# Patient Record
Sex: FEMALE | Race: WHITE | Employment: STUDENT | URBAN - METROPOLITAN AREA
[De-identification: names, ages, dates, MRNs, and addresses within clinical notes are randomized per-mention and may not be internally consistent; named-entity substitution may affect disease eponyms.]

---

## 2021-07-02 ENCOUNTER — HOSPITAL ENCOUNTER (OUTPATIENT)
Dept: PHYSICAL THERAPY | Age: 21
Setting detail: THERAPIES SERIES
Discharge: HOME | End: 2021-07-02

## 2021-07-02 PROCEDURE — 97161 PT EVAL LOW COMPLEX 20 MIN: CPT

## 2021-07-02 PROCEDURE — 97110 THERAPEUTIC EXERCISES: CPT

## 2021-07-02 NOTE — FLOWSHEET NOTE
[facility], [place], [address]  Phone: [phone]  Fax [phone]        Physical Therapy Treatment Note/ Progress Report:           Date:  2021    Patient Name:  Misha German    :  2000  MRN: [id number]  Restrictions/Precautions:    Medical/Treatment Diagnosis Information:  Diagnosis: M25.572 L ankle pain  Treatment Diagnosis: M25.572 L ankle pain, M62.82 L LE weakness, M25.672 L ankle stiffness  Insurance/Certification information:  PT Insurance Information: [place]  Physician Information:  Referring Practitioner: Dr. Raghav Perdue  Has the plan of care been signed (Y/N):        []  Yes  [x]  No     Date of Patient follow up with Physician: to get scheduled with Dr. Vero Barajas next week      Is this a Progress Report:     []  Yes  [x]  No        If Yes:  Date Range for reporting period:  Beginning 21  Ending     Progress report will be due (10 Rx or 30 days whichever is DPDY):65      Recertification will be due (POC Duration  / 90 days whichever is less): 21     Visit # Insurance Allowable Auth Required   In-person 1 ?  []  Yes []  No    Telehealth - ? []  Yes []  No    Total          Functional Scale: LEFS: 65/80 = 81% ability, 19% disability  Date assessed:  21         Number of Comorbidities:  [x]0     []1-2    []3+    Latex Allergy:  [x]NO      []YES  Preferred Language for Healthcare:   [x]English       []other:      Pain level:  2-7/10     SUBJECTIVE:  See eval    OBJECTIVE: See eval   Observation:    Test measurements:      RESTRICTIONS/PRECAUTIONS: none    Exercises/Interventions:     Therapeutic Ex (77963) Sets/Reps/ sec Notes/CUES   Clamshell Green x15 L    S/L hip abd Green x15 L    Bridge with abd Green x15    Foot intrinsics nv    Ankle t-band 5 way with foot pointing nv    Seated releve with tennis ball nv                             Pt edu: kinetic chain, use of hip for turnout and support, activity modification     Manual Intervention (05545)     Taping nv? Talar post mob nv                        NMR re-education (29006)  CUES NEEDED   SLB challenges nv    Footprints nv                                       Therapeutic Activity (07844)                                         Therapeutic Exercise and NMR EXR  [] (95360) Provided verbal/tactile cueing for activities related to strengthening, flexibility, endurance, ROM for improvements in LE, proximal hip, and core control with self care, mobility, lifting, ambulation.  [] (85585) Provided verbal/tactile cueing for activities related to improving balance, coordination, kinesthetic sense, posture, motor skill, proprioception  to assist with LE, proximal hip, and core control in self care, mobility, lifting, ambulation and eccentric single leg control.      NMR and Therapeutic Activities:    [] (21107 or 35120) Provided verbal/tactile cueing for activities related to improving balance, coordination, kinesthetic sense, posture, motor skill, proprioception and motor activation to allow for proper function of core, proximal hip and LE with self care and ADLs  [] (10684) Gait Re-education- Provided training and instruction to the patient for proper LE, core and proximal hip recruitment and positioning and eccentric body weight control with ambulation re-education including up and down stairs     Home Exercise Program:    [x] (81340) Reviewed/Progressed HEP activities related to strengthening, flexibility, endurance, ROM of core, proximal hip and LE for functional self-care, mobility, lifting and ambulation/stair navigation   [] (14583)Reviewed/Progressed HEP activities related to improving balance, coordination, kinesthetic sense, posture, motor skill, proprioception of core, proximal hip and LE for self care, mobility, lifting, and ambulation/stair navigation      Manual Treatments:  PROM / STM / Oscillations-Mobs:  G-I, II, III, IV (PA's, Inf., Post.)  [] (05913) Provided manual therapy to mobilize LE, proximal hip and/or LS spine soft tissue/joints for the purpose of modulating pain, promoting relaxation,  increasing ROM, reducing/eliminating soft tissue swelling/inflammation/restriction, improving soft tissue extensibility and allowing for proper ROM for normal function with self care, mobility, lifting and ambulation. Modalities:     [] GAME READY (VASO)- for significant edema, swelling, pain control. Charges  Timed Code Treatment Minutes: 13 min   Total Treatment Minutes: 45 min     [x] EVAL (LOW) 45103   [] EVAL (MOD) 00858  [] EVAL (HIGH) 58761   [] RE-EVAL     [x] BN(58547) x 1    [] IONTO  [] NMR (07574) x     [] VASO  [] Manual (09375) x      [] Other:  [] TA x      [] Mech Traction (21283)  [] ES(attended) (37630)      [] ES (un) (62106):       GOALS:   Patient stated goal: get back to dancing without pain  [] Progressing: [] Met: [] Not Met: [] Adjusted    Therapist goals for Patient:   Short Term Goals: To be achieved in: 2 weeks  1. Independent in HEP and progression per patient tolerance, in order to prevent re-injury. [] Progressing: [] Met: [] Not Met: [] Adjusted  2. Patient will have a decrease in pain to facilitate improvement in movement, function, and ADLs as indicated by Functional Deficits. [] Progressing: [] Met: [] Not Met: [] Adjusted    Long Term Goals: To be achieved in: 6 weeks  1. Disability index score of 10% or less for the LEFS to assist with reaching prior level of function. [] Progressing: [] Met: [] Not Met: [] Adjusted  2. Patient will demonstrate increased AROM to WNL and symmetrical to other limb to allow for proper joint functioning as indicated by patients Functional Deficits. [] Progressing: [] Met: [] Not Met: [] Adjusted  3.  Patient will demonstrate an increase in Strength to good proximal hip strength and control, within 5lb HHD in LE to allow for proper functional mobility as indicated by patients Functional Deficits. [] Progressing: [] Met: [] Not Met: [] Adjusted  4. Patient will be able to ascend and descend flight of stairs with normal reciprocal pattern without increased symptoms or restriction. [] Progressing: [] Met: [] Not Met: [] Adjusted  5. 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 [] Progressing: [] Met: [] Not Met: [] Adjusted    Progression Towards Functional goals:  [] Patient is progressing as expected towards functional goals listed. [] Progression is slowed due to complexities listed. [] Progression has been slowed due to co-morbidities. [x] Plan just implemented, too soon to assess goals progression  [] Other:         Overall Progression Towards Functional goals/ Treatment Progress Update:  [] Patient is progressing as expected towards functional goals listed. [] Progression is slowed due to complexities/Impairments listed. [] Progression has been slowed due to co-morbidities. [x] Plan just implemented, too soon to assess goals progression <30days   [] Goals require adjustment due to lack of progress  [] Patient is not progressing as expected and requires additional follow up with physician  [] Other    Prognosis for POC: [x] Good [] Fair  [] Poor      Patient requires continued skilled intervention: [x] Yes  [] No    Treatment/Activity Tolerance:  [x] Patient able to complete treatment  [] Patient limited by fatigue  [] Patient limited by pain    [] Patient limited by other medical complications  [] Other:     ASSESSMENT: Pt found the hip strengthening to be quite fatiguing at the hip but did not cause any increased ankle pain.  After pt education pt seems to have better understanding of the kinetic chain and how all pieces needs to work well otherwise there will be compensation and overstress somewhere along the chain. Return to Play: (if applicable)   [x]  Stage 1: Intro to Strength   []  Stage 2: Return to Run and Strength   []  Stage 3: Return to Jump and Strength   []  Stage 4: Dynamic Strength and Agility   []  Stage 5: Sport Specific Training     []  Ready to Return to Play, Meets All Above Stages   []  Not Ready for Return to Sports   Comments:                               PLAN: See win. Pt to be seen 2x week for 6 weeks. May also work with [facility] for general conditioning. [x] Continue per plan of care [] Alter current plan (see comments above)  [x] Plan of care initiated [] Hold pending MD visit [] Discharge      Electronically signed by:  Adriane Bolanos, PT, DPT, AT, [place] [id number]    Note: If patient does not return for scheduled/ recommended follow up visits, this note will serve as a discharge from care along with most recent update on progress.

## 2021-07-02 NOTE — PLAN OF CARE
[facility] and [address], [address]  Phone: [phone]  Fax [phone]         Physical Therapy Certification    Dear Referring Practitioner: Dr. Lashon Simmons,    We had the pleasure of evaluating the following patient for physical therapy services at [address] [place] [address]. A summary of our findings can be found in the initial assessment below. This includes our plan of care. If you have any questions or concerns regarding these findings, please do not hesitate to contact me at the office phone number checked above. Thank you for the referral.       Physician Signature:_______________________________Date:__________________  By signing above (or electronic signature), therapists plan is approved by physician    Patient: Miri Kearns   : 2000   MRN: [id number]  Referring Physician: Referring Practitioner: Dr. Lashon Simmons      Evaluation Date: 2021      Medical Diagnosis Information:  Diagnosis: M25.572 L ankle pain   Treatment Diagnosis: M25.572 L ankle pain, M62.82 L LE weakness, M25.672 L ankle stiffness                                         Insurance information: PT Insurance Information: [place]       Precautions/ Contra-indications: none    C-SSRS Triggered by Intake questionnaire (Past 2 wk assessment):   [] No, Questionnaire did not trigger screening. [x] Yes, Patient intake triggered further evaluation      [x] C-SSRS Screening completed: f/u on this assessment at 1st follow-up  [] PCP notified via Plan of Care  [] Emergency services notified     Latex Allergy:  [x]NO      []YES  Preferred Language for Healthcare:   [x]English       []other:    SUBJECTIVE: Patient stated complaint: In 2019 had a L ankle laith but treated it herself and she seemed to recover fine.  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 She saw the  and got taped and thought it would be okay but now that it has persisted and AT saw some kinetic chain issues they decided it best to get into PT to fully address the situation before she starts her fall season with [address] At this point she is not doing en pointe, no single releve or jumps or turns on the L foot. Pt says no troubles with the R foot. Pt also has pain and limitation with going down and up stairs, standing more than 15 min, walking more than a couple minutes, hurts more when has to walk fast or on uneven ground. Pt also reports the ankle feels unstable and may roll easily.       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)    Living Status/Prior Level of Function: Independent with ADLs and IADLs, pre-professional ballet dancer      OBJECTIVE:     ROM LEFT RIGHT   HIP Flex     HIP Abd     HIP Ext     HIP IR     HIP ER     Knee ext     Knee Flex     Ankle  deg 100 deg   Ankle DF knee straight 15 deg pain 10 deg   Ankle DF knee bent 14 deg 14 deg   Ankle In 22 deg pain 28 deg   Ankle Ev 12 deg 12 deg   Strength  LEFT RIGHT   HIP Flexors 5/5 5/5   HIP Abductors 5- 5-   HIP Ext 5 5   Hip ER 3+ 4   Hip IR 3+ 4   Knee EXT (quad)     Knee Flex (HS)     Ankle DF 4/5 5/5   Ankle PF 4+ 5   Ankle Inv 4 5   Ankle EV 4+ 5-   Toes flex 4- 5   Toes ext 4/5 5/5   Circumference  Mid apex  7 cm prox             Reflexes/Sensation: [x]Dermatomes/Myotomes intact    []Reflexes equal and normal bilaterally   []Other:    Joint mobility:     []Normal    [x]Hypo: L talar posterior glide   []Hyper    Palpation: sinus tarsi, anterior TC joint line, distal peroneal tendon    Functional Mobility/Transfers: WFL    Posture: WNL    Bandages/Dressings/Incisions: none    Gait: (include devices/WB status) WNL  Dance Functional Assessment: difficulty in maintains hip ER turnout with L as stance leg, decreased L ankle strategy for L SLB, poor ankle alignments control and stability while perform DL and left SL releve (heel raise). Orthopedic Special Tests: + anterior drawer L, -talar tilt                       [x] Patient history, allergies, meds reviewed. Medical chart reviewed. See intake form. Review Of Systems (ROS):  [x]Performed Review of systems (Integumentary, CardioPulmonary, Neurological) by intake and observation. Intake form has been scanned into medical record. Patient has been instructed to contact their primary care physician regarding ROS issues if not already being addressed at this time.       Co-morbidities/Complexities (which will affect course of rehabilitation):  [x]None           Arthritic conditions   []Rheumatoid arthritis (M05.9)  []Osteoarthritis (M19.91)   Cardiovascular conditions   []Hypertension (I10)  []Hyperlipidemia (E78.5)  []Angina pectoris (I20)  []Atherosclerosis (I70)   Musculoskeletal conditions   []Disc pathology   []Congenital spine pathologies   []Prior surgical intervention  []Osteoporosis (M81.8)  []Osteopenia (M85.8)   Endocrine conditions   []Hypothyroid (E03.9)  []Hyperthyroid Gastrointestinal conditions   []Constipation (J91.89)   Metabolic conditions   []Morbid obesity (E66.01)  []Diabetes type 1(E10.65) or 2 (E11.65)   []Neuropathy (G60.9)     Pulmonary conditions   []Asthma (J45)  []Coughing   []COPD (J44.9)   Psychological Disorders  []Anxiety (F41.9)  []Depression (F32.9)   []Other:   []Other: Barriers to/and or personal factors that will affect rehab potential:              []Age  []Sex              []Motivation/Lack of Motivation                        []Co-Morbidities              []Cognitive Function, education/learning barriers              []Environmental, home barriers              []profession/work barriers  []past PT/medical experience  []other:  Justification:     Falls Risk Assessment (30 days):   [x] Falls Risk assessed and no intervention required. [] Falls Risk assessed and Patient requires intervention due to being higher risk   TUG score (>12s at risk):     [] Falls education provided, including           ASSESSMENT: Pt has been experiencing reoccurring L ankle pain with an increase in her activity level that has now worsened to pain with daily ambulation and ADLs. 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       Functional Impairments:     [x]Noted lumbar/proximal hip/LE joint hypomobility   [x]Decreased LE functional ROM   [x]Decreased core/proximal hip strength and neuromuscular control   [x]Decreased LE functional strength   [x]Reduced balance/proprioceptive control   []other:      Functional Activity Limitations (from functional questionnaire and intake)   []Reduced ability to tolerate prolonged functional positions   []Reduced ability or difficulty with changes of positions or transfers between positions   []Reduced ability to maintain good posture and demonstrate good body mechanics with sitting, bending, and lifting   []Reduced ability to sleep   [] Reduced ability or tolerance with driving and/or computer work   []Reduced ability to perform lifting, carrying tasks   [x]Reduced ability to squat   []Reduced ability to forward bend   [x]Reduced ability to ambulate prolonged functional periods/distances/surfaces   [x]Reduced ability to ascend/descend stairs   [x]Reduced ability to run, hop, cut or jump   []other:    Participation Restrictions   []Reduced participation in self care activities   [x]Reduced participation in home management activities   [x]Reduced participation in work activities   [x]Reduced participation in social activities. [x]Reduced participation in sport/recreation activities. Classification :    []Signs/symptoms consistent with post-surgical status including decreased ROM, strength and function.    [x]Signs/symptoms consistent with joint sprain/strain   []Signs/symptoms consistent with patella-femoral syndrome   []Signs/symptoms consistent with knee OA/hip OA   []Signs/symptoms consistent with internal derangement of knee/Hip   [x]Signs/symptoms consistent with functional hip weakness/NMR control      []Signs/symptoms consistent with tendinitis/tendinosis    []signs/symptoms consistent with pathology which may benefit from Dry needling      []other:      Prognosis/Rehab Potential:      []Excellent   [x]Good    []Fair   []Poor    Tolerance of evaluation/treatment:    []Excellent   [x]Good    []Fair   []Poor  Physical Therapy Evaluation Complexity Justification  [x] A history of present problem with:  [x] no personal factors and/or comorbidities that impact the plan of care;  []1-2 personal factors and/or comorbidities that impact the plan of care  []3 personal factors and/or comorbidities that impact the plan of care  [x] An examination of body systems using standardized tests and measures addressing any of the following: body structures and functions (impairments), activity limitations, and/or participation restrictions;:  [x] a total of 1-2 or more elements [] a total of 3 or more elements   [] a total of 4 or more elements   [x] A clinical presentation with:  [x] stable and/or uncomplicated characteristics   [] evolving clinical presentation with changing characteristics  [] unstable and unpredictable characteristics;   [x] Clinical decision making of [x] low, [] moderate, [] high complexity using standardized patient assessment instrument and/or measurable assessment of functional outcome. [x] EVAL (LOW) 61720 (typically 20 minutes face-to-face)  [] EVAL (MOD) 24164 (typically 30 minutes face-to-face)  [] EVAL (HIGH) 22748 (typically 45 minutes face-to-face)  [] RE-EVAL       PLAN:   Frequency/Duration:  2 days per week for 6 Weeks:  Interventions:  [x]  Therapeutic exercise including: strength training, ROM, for Lower extremity and core   [x]  NMR activation and proprioception for LE, Glutes and Core   [x]  Manual therapy as indicated for LE, Hip and spine to include: Dry Needling/IASTM, STM, PROM, Gr I-IV mobilizations, manipulation. [x] Modalities as needed that may include: thermal agents, E-stim, Biofeedback, US, iontophoresis as indicated  [x] Patient education on joint protection, postural re-education, activity modification, progression of HEP. HEP instruction:   Access Code: Gina Green: [email or web address]  Date: 07/02/2021  Prepared by: Treva Whitfield    Exercises  Clamshell with Resistance - 1 x daily - 7 x weekly - 3 sets - 15 reps  Sidelying Hip Abduction with Resistance at Thighs - 1 x daily - 7 x weekly - 3 sets - 15 reps  Supine Bridge with Resistance Band - 1 x daily - 7 x weekly - 3 sets - 15 reps  Supine Figure 4 Piriformis Stretch - 1 x daily - 7 x weekly - 3 sets - 1 reps - 30 sec hold  Supine Gluteus Stretch - 1 x daily - 7 x weekly - 3 sets - 1 reps - 30 sec hold      GOALS:  Patient stated goal: get back to dancing without pain  [] Progressing: [] Met: [] Not Met: [] Adjusted    Therapist goals for Patient:   Short Term Goals: To be achieved in: 2 weeks  1. Independent in HEP and progression per patient tolerance, in order to prevent re-injury. [] Progressing: [] Met: [] Not Met: [] Adjusted  2. Patient will have a decrease in pain to facilitate improvement in movement, function, and ADLs as indicated by Functional Deficits. [] Progressing: [] Met: [] Not Met: [] Adjusted    Long Term Goals: To be achieved in: 6 weeks  1. Disability index score of 10% or less for the LEFS to assist with reaching prior level of function. [] Progressing: [] Met: [] Not Met: [] Adjusted  2. Patient will demonstrate increased AROM to WNL and symmetrical to other limb to allow for proper joint functioning as indicated by patients Functional Deficits. [] Progressing: [] Met: [] Not Met: [] Adjusted  3. Patient will demonstrate an increase in Strength to good proximal hip strength and control, within 5lb HHD in LE to allow for proper functional mobility as indicated by patients Functional Deficits. [] Progressing: [] Met: [] Not Met: [] Adjusted  4. Patient will be able to ascend and descend flight of stairs with normal reciprocal pattern without increased symptoms or restriction. [] Progressing: [] Met: [] Not Met: [] Adjusted  5. 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      [] Progressing: [] Met: [] Not Met: [] Adjusted     Electronically signed by:  Jeffery Galarza, PT ,DPT, AT, [facility] [id number]

## 2021-07-07 ENCOUNTER — OFFICE VISIT (OUTPATIENT)
Dept: ORTHOPEDIC SURGERY | Age: 21
End: 2021-07-07
Payer: COMMERCIAL

## 2021-07-07 ENCOUNTER — HOSPITAL ENCOUNTER (OUTPATIENT)
Dept: PHYSICAL THERAPY | Age: 21
Setting detail: THERAPIES SERIES
Discharge: HOME | End: 2021-07-07

## 2021-07-07 VITALS — WEIGHT: 120 LBS | RESPIRATION RATE: 12 BRPM | BODY MASS INDEX: 21.26 KG/M2 | HEIGHT: 63 IN

## 2021-07-07 DIAGNOSIS — M84.375A STRESS FRACTURE OF METATARSAL BONE OF LEFT FOOT, INITIAL ENCOUNTER: Primary | ICD-10-CM

## 2021-07-07 DIAGNOSIS — S93.492A SPRAIN OF ANTERIOR TALOFIBULAR LIGAMENT OF LEFT ANKLE, INITIAL ENCOUNTER: ICD-10-CM

## 2021-07-07 DIAGNOSIS — M25.572 ACUTE LEFT ANKLE PAIN: ICD-10-CM

## 2021-07-07 DIAGNOSIS — M79.672 LEFT FOOT PAIN: ICD-10-CM

## 2021-07-07 PROCEDURE — 99204 OFFICE O/P NEW MOD 45 MIN: CPT | Performed by: ORTHOPAEDIC SURGERY

## 2021-07-07 PROCEDURE — 97140 MANUAL THERAPY 1/> REGIONS: CPT

## 2021-07-07 PROCEDURE — 97110 THERAPEUTIC EXERCISES: CPT

## 2021-07-07 RX ORDER — IBUPROFEN 200 MG
200 TABLET ORAL EVERY 6 HOURS PRN
COMMUNITY
End: 2021-09-20

## 2021-07-07 NOTE — FLOWSHEET NOTE
The [address], [address]  Phone: [phone]  Fax [phone]        Physical Therapy Treatment Note/ Progress Report:           Date:  2021    Patient Name:  Niranjan Wall    :  2000  MRN: [id number]  Restrictions/Precautions:    Medical/Treatment Diagnosis Information:  Diagnosis: M25.572 L ankle pain  Treatment Diagnosis: M25.572 L ankle pain, M62.82 L LE weakness, M25.672 L ankle stiffness  Insurance/Certification information:  PT Insurance Information: [place]  Physician Information:  Referring Practitioner: Dr. Alyx Camarena  Has the plan of care been signed (Y/N):        []  Yes  [x]  No     Date of Patient follow up with Physician: to get scheduled with Dr. Julia Braga next week      Is this a Progress Report:     []  Yes  [x]  No        If Yes:  Date Range for reporting period:  Beginning 21  Ending     Progress report will be due (10 Rx or 30 days whichever is LKTC):      Recertification will be due (POC Duration  / 90 days whichever is less): 21     Visit # Insurance Allowable Auth Required   In-person 2 ? []  Yes []  No    Telehealth - ? []  Yes []  No    Total          Functional Scale: LEFS: 65/80 = 81% ability, 19% disability  Date assessed:  21         Number of Comorbidities:  [x]0     []1-2    []3+    Latex Allergy:  [x]NO      []YES  Preferred Language for Healthcare:   [x]English       []other:      Pain level:  5-6/10 walking, 7-8/10 dancing    SUBJECTIVE:  Pt saw the physician this morning and he said the x-ray didn't show anything and is now suggesting getting an MRI.  I talked with the patient about realizing all of this [place] of [place] and not going to be covered by her insurance and will be out of pocket costs so even though she is 23 yo if her parents are still covering her medical costs she really needs to discuss the financial obligation of these medical / Oscillations-Mobs:  G-I, II, III, IV (PA's, Inf., Post.)  [x] (48612) Provided manual therapy to mobilize LE, proximal hip and/or LS spine soft tissue/joints for the purpose of modulating pain, promoting relaxation,  increasing ROM, reducing/eliminating soft tissue swelling/inflammation/restriction, improving soft tissue extensibility and allowing for proper ROM for normal function with self care, mobility, lifting and ambulation. Modalities:     [] GAME READY (VASO)- for significant edema, swelling, pain control. Charges  Timed Code Treatment Minutes: 45 min   Total Treatment Minutes: 45 min     [] EVAL (LOW) 47932   [] EVAL (MOD) 81207  [] EVAL (HIGH) 40314   [] RE-EVAL     [x] HA(70737) x 2    [] IONTO  [] NMR (21730) x     [] VASO  [x] Manual (74534) x  1    [] Other:  [] TA x      [] Mech Traction (77616)  [] ES(attended) (22654)      [] ES (un) (19245):       GOALS:   Patient stated goal: get back to dancing without pain  [] Progressing: [] Met: [] Not Met: [] Adjusted    Therapist goals for Patient:   Short Term Goals: To be achieved in: 2 weeks  1. Independent in HEP and progression per patient tolerance, in order to prevent re-injury. [] Progressing: [] Met: [] Not Met: [] Adjusted  2. Patient will have a decrease in pain to facilitate improvement in movement, function, and ADLs as indicated by Functional Deficits. [] Progressing: [] Met: [] Not Met: [] Adjusted    Long Term Goals: To be achieved in: 6 weeks  1. Disability index score of 10% or less for the LEFS to assist with reaching prior level of function. [] Progressing: [] Met: [] Not Met: [] Adjusted  2. Patient will demonstrate increased AROM to WNL and symmetrical to other limb to allow for proper joint functioning as indicated by patients Functional Deficits. [] Progressing: [] Met: [] Not Met: [] Adjusted  3.  Patient will demonstrate an increase in Strength to good proximal hip strength and control, within 5lb HHD in LE to allow for proper functional mobility as indicated by patients Functional Deficits. [] Progressing: [] Met: [] Not Met: [] Adjusted  4. Patient will be able to ascend and descend flight of stairs with normal reciprocal pattern without increased symptoms or restriction. [] Progressing: [] Met: [] Not Met: [] Adjusted  5. 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 [] Progressing: [] Met: [] Not Met: [] Adjusted    Progression Towards Functional goals:  [] Patient is progressing as expected towards functional goals listed. [] Progression is slowed due to complexities listed. [] Progression has been slowed due to co-morbidities. [x] Plan just implemented, too soon to assess goals progression  [] Other:         Overall Progression Towards Functional goals/ Treatment Progress Update:  [] Patient is progressing as expected towards functional goals listed. [] Progression is slowed due to complexities/Impairments listed. [] Progression has been slowed due to co-morbidities. [x] Plan just implemented, too soon to assess goals progression <30days   [] Goals require adjustment due to lack of progress  [] Patient is not progressing as expected and requires additional follow up with physician  [] Other    Prognosis for POC: [x] Good [] Fair  [] Poor      Patient requires continued skilled intervention: [x] Yes  [] No    Treatment/Activity Tolerance:  [x] Patient able to complete treatment  [] Patient limited by fatigue  [] Patient limited by pain    [] Patient limited by other medical complications  [] Other:     ASSESSMENT:After talar posterior glide pt had improved DF ROM with less pain. Able to work up and down the kinetic chain today which fatigued both the hip and the ankle.  Pt found seated releve with tennis ball how much less stability she had int he L ankle compared to the R. She also continues to notice immediate pain relief with the ankle taping supporting ankle eversion. Return to Play: (if applicable)   [x]  Stage 1: Intro to Strength   []  Stage 2: Return to Run and Strength   []  Stage 3: Return to Jump and Strength   []  Stage 4: Dynamic Strength and Agility   []  Stage 5: Sport Specific Training     []  Ready to Return to Play, Meets All Above Stages   []  Not Ready for Return to Sports   Comments:                               PLAN: See win. Pt to be seen 2x week for 6 weeks. May also work with [facility] [place] for general conditioning. [x] Continue per plan of care [] Alter current plan (see comments above)  [] Plan of care initiated [] Hold pending MD visit [] Discharge      Electronically signed by:  Uzair Quan, PT, DPT, AT, OCS [id number]    Note: If patient does not return for scheduled/ recommended follow up visits, this note will serve as a discharge from care along with most recent update on progress.

## 2021-07-08 NOTE — PROGRESS NOTES
[facility]  Office Visit    Chief Complaint    Ankle Pain (NP, left ankle/foot pain. 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 Reports having ankle pain for roughly 2 weeks, with increased foot pain a few days later. No injury. Reports doing PT at [facility] and using Ice/NSAIDs. No improvement, despite resting and limiting activities. )      History of Present Illness:  Ruddy Alicea is a 22 y.o. female who presents for persistent left ankle pain for the past 2 to 3 weeks. 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 She denies a specific injury. . Patient describes their pain as 5/10, dull, achy, sharp, worse with point and releve, but now with almost daily weight bearing. The patient denies any specific injury. The patient denies any clicking, popping, or locking of the joint. The patient denies any numbness, paresthesias, or weakness. She had initial relief with ibuprofen, rest, and exercises prescribed by the dance medicine PT team, but then these seems to have exacerbated her symptoms. Now her pain is felt at the base of the 5th metatarsal, and discomfort with repeated pointe posture and training. Pain Assessment  Location of Pain: Ankle  Location Modifiers: Left  Severity of Pain: 5  Quality of Pain: Sharp, Dull, Aching  Duration of Pain: Persistent  Frequency of Pain: Constant  Aggravating Factors: Other (Comment) (Activities)  Limiting Behavior: Yes  Relieving Factors: Rest, Ice, Nsaids  Result of Injury: No  Work-Related Injury: No  Are there other pain locations you wish to document?: No    Past Medical History:   Diagnosis Date    Sprain of foot, left 2019        History reviewed. No pertinent surgical history. History reviewed. No pertinent family history.     Social History     Socioeconomic History    Marital status: Single     Spouse name: None    Number of children: None    Years of education: None    Highest education level: None   Occupational History    None Tobacco Use    Smoking status: Never Smoker    Smokeless tobacco: Never Used   Substance and Sexual Activity    Alcohol use: Yes     Comment: Rarely    Drug use: Never    Sexual activity: None   Other Topics Concern    None   Social History Narrative    None     Social Determinants of Health     Financial Resource Strain:     Difficulty of Paying Living Expenses:    Food Insecurity:     Worried About Running Out of Food in the Last Year:     [address] in the Last Year:    Transportation Needs:     Lack of Transportation (Medical):  Lack of Transportation (Non-Medical):    Physical Activity:     Days of Exercise per Week:     Minutes of Exercise per Session:    Stress:     Feeling of Stress :    Social Connections:     Frequency of Communication with Friends and Family:     Frequency of Social Gatherings with Friends and Family:     Attends [employer] Services:     Active Member of Clubs or Organizations:     Attends Club or Organization Meetings:     Marital Status:    Intimate Partner Violence:     Fear of Current or Ex-Partner:     Emotionally Abused:     Physically Abused:     Sexually Abused:        Current Outpatient Medications   Medication Sig Dispense Refill    ibuprofen (ADVIL;MOTRIN) 200 MG tablet Take 200 mg by mouth every 6 hours as needed for Pain       No current facility-administered medications for this visit. No Known Allergies      Review of Systems:  A 14 point review of systems available in the scanned medical record as documented by the patient. Today's review pertinent items are noted in HPI. Vital Signs:   Resp 12   Ht 5' 3\" (1.6 m)   Wt 120 lb (54.4 kg)   LMP 07/07/2021   BMI 21.26 kg/m²     General Exam:    Neuro: Alert & Oriented x 3,  normal,  no focal deficits noted. Normal mood & affect.   Eyes: Sclera clear  Ears: Normal external ear  Mouth:  No perioral lesions  Pulm: Respirations unlabored and regular   Skin: Warm, well perfused      Ankle exam: Left  Inspection: mild evidence of ankle swelling. no erythema or cellulitis around the ankle. No evidence of bruising    Range of motion: -10 to 45 degrees of plantarflexion. 45 degrees of inversion associated with no pain, 25 degrees of eversion. Normal Silverskjold test with no evidence of gastroc-achilles contracture. Resisted strength testin out of 5 strength in dorsiflexion, plantarflexion, inversion, and eversion. Palpation: mild tenderness at base 5th Metatarsal. She is Tender along the anterolateral joint line and anterolateral ligamentous complex. Nontender around the Achilles nor deltoid ligament nor any bony prominences. Stability test: Normal anterior drawer with solid endpoint. Neurovascular exam: Normal neuro vascular exam of the ankle and foot. Radiology:     3 View X-rays (AP latera and mortise) of both the LEFT ankle were obtained and reviewed in office. Impression: no acute findings. Assessment: Patient is a 22 y.o. female left anterior ankle pain and base of the fifth metatarsal pain for the past 3 weeks that has not improved with activity modification, rest, and physical therapy exercises. In fact her symptoms may have progressed. I am concerned for either a base of the fifth metatarsal stress fracture, and or a cartilage injury of her talar dome. Impression:  Visit Diagnoses       Codes    Stress fracture of metatarsal bone of left foot, initial encounter    -  Primary M84.375A    Sprain of anterior talofibular ligament of left ankle, initial encounter     S93.492A    Acute left ankle pain     M25.572    Left foot pain     M79.672          Office Procedures:  No orders of the defined types were placed in this encounter.     Orders Placed This Encounter   Procedures    XR ANKLE LEFT (MIN 3 VIEWS)     Standing Status:   Future     Number of Occurrences:   1     Standing Expiration Date:   2022    XR FOOT LEFT (2 VIEWS)     Standing Status:   Future     Number of Occurrences:   1     Standing Expiration Date:   7/7/2022    MRI ANKLE LEFT WO CONTRAST     Standing Status:   Future     Standing Expiration Date:   7/7/2022     Scheduling Instructions:      **[facility] [facility]      [facility]            PHONE: [phone]      FAX: [phone]            MRI LEFT ANKLE W/O CONTRAST       R/O CARTILAGE INJURY TO TALUS AND LATERAL LIGAMENT INJURY            MRI SCHEDULED FOR: NEEDS SCHEDULED            PLEASE NOTE: IMAGING RESULTS ARE NOT GIVEN OVER THE PHONE. AN IN-OFFICE APPOINTMENT IS REQUIRED UNLESS OTHER ARRANGEMENTS ARE PREVIOUSLY MADE. PLEASE SCHEDULE THIS PRIOR TO LEAVING THE OFFICE TODAY.  MRI FOOT LEFT WO CONTRAST     Standing Status:   Future     Standing Expiration Date:   7/7/2022     Scheduling Instructions:      **[facility]  [facility]            PHONE: [phone]      FAX: [phone]            MRI LEFT FOOT W/O CONTRAST       R/O 5TH METATARSAL FRACTURE             MRI SCHEDULED FOR: NEEDS SCHEDULED            PLEASE NOTE: IMAGING RESULTS ARE NOT GIVEN OVER THE PHONE. AN IN-OFFICE APPOINTMENT IS REQUIRED UNLESS OTHER ARRANGEMENTS ARE PREVIOUSLY MADE. PLEASE SCHEDULE THIS PRIOR TO LEAVING THE OFFICE TODAY.  Ambulatory referral to Physical Therapy     Referral Priority:   Routine     Referral Type:   Eval and Treat     Referral Reason:   Specialty Services Required     Requested Specialty:   Physical Therapy     Number of Visits Requested:   1       Plan:  Pertinent imaging was reviewed. The etiology, natural history, and treatment options for the disorder were discussed. The roles of activity medication, antiinflammatories, injections, bracing, physical therapy, and surgical interventions were all described to the patient and questions were answered.     We believe patient is a candidate for an MRI of her left ankle and foot. This would be to rule out a talar dome cartilage injury, lateral ligamentous injury of the ankle, and/or stress fracture of the base of the metatarsal.     We will see her back after the MRI to review the findings. Meanwhile she will continue with her ibuprofen regimen and home exercises. All the patient's questions were answered while in the clinic. The patient is understanding of all instructions and agrees with the plan. Approximately 45 minutes was spent on patient education and coordinating care. Follow up in: Return in about 2 weeks (around 7/21/2021). Sincerely,    Ceasar Shaw MD [address] [place], [address] [place] [address]  Email: [email or web address]  Office: [phone]    The encounter with Miri Kearns was carried out by myself, Dr Jorden Paulino, who personally examined the patient and reviewed the plan. This dictation was performed with a verbal recognition program (DRAGON) and it was checked for errors. It is possible that there are still dictated errors within this office note. If so, please bring any errors to my attention for an addendum. All efforts were made to ensure that this office note is accurate.        07/08/21  8:23 AM

## 2021-07-09 ENCOUNTER — APPOINTMENT (OUTPATIENT)
Dept: PHYSICAL THERAPY | Age: 21
End: 2021-07-09

## 2021-07-12 ENCOUNTER — HOSPITAL ENCOUNTER (OUTPATIENT)
Dept: PHYSICAL THERAPY | Age: 21
Setting detail: THERAPIES SERIES
Discharge: HOME | End: 2021-07-12

## 2021-07-12 PROCEDURE — 97140 MANUAL THERAPY 1/> REGIONS: CPT

## 2021-07-12 PROCEDURE — 97112 NEUROMUSCULAR REEDUCATION: CPT

## 2021-07-12 PROCEDURE — 97110 THERAPEUTIC EXERCISES: CPT

## 2021-07-12 NOTE — FLOWSHEET NOTE
The [address] [place] [address],[address], [address]  Phone: [phone]  Fax [phone]        Physical Therapy Treatment Note/ Progress Report:           Date:  2021    Patient Name:  Maria Guadalupe Garcia    :  2000  MRN: [id number]  Restrictions/Precautions:    Medical/Treatment Diagnosis Information:  Diagnosis: M25.572 L ankle pain  Treatment Diagnosis: M25.572 L ankle pain, M62.82 L LE weakness, M25.672 L ankle stiffness  Insurance/Certification information:  PT Insurance Information: [place]  Physician Information:  Referring Practitioner: Dr. Raynette Severin  Has the plan of care been signed (Y/N):        []  Yes  [x]  No     Date of Patient follow up with Physician: to get scheduled with Dr. Willy Sicard next week      Is this a Progress Report:     []  Yes  [x]  No        If Yes:  Date Range for reporting period:  Beginning 21  Ending     Progress report will be due (10 Rx or 30 days whichever is [facility]):      Recertification will be due (POC Duration  / 90 days whichever is less): 21     Visit # Insurance Allowable Auth Required   In-person 3 ? []  Yes []  No    Telehealth - ? []  Yes []  No    Total          Functional Scale: LEFS: 65/80 = 81% ability, 19% disability  Date assessed:  21         Number of Comorbidities:  [x]0     []1-2    []3+    Latex Allergy:  [x]NO      []YES  Preferred Language for Healthcare:   [x]English       []other:      Pain level:  0/10 at rest, 7/10 with dancing and walking    SUBJECTIVE:  Pt says as always the tape really helped the ankle pain. However yesterday she had to work a double shift as a  and that really bothered her ankle and this morning she started off with more pain that what she has been having.         OBJECTIVE:    Observation: point tender L sinus tarsi, mild talar posterior glide hypomobility, mild L cuboid drop   Test measurements: RESTRICTIONS/PRECAUTIONS: none    Exercises/Interventions:     Therapeutic Ex (44713) Sets/Reps/ sec Notes/CUES   Clamshell w/ feet up    S/L hip abd w/ ext \"L\"    Bridge with abd     Foot intrinsics: toe curls, toes ext, doming    Ankle t-band 5 way with foot pointing    Seated releve with tennis ball    Side stepping Green 2x20ft    BAPS: DF/PF, inv/ev, CW/CCW NWB, L3 x20ea                   Pt edu: kinetic chain, use of hip for turnout and support, activity modification     Manual Intervention (32087) 14 min total    Taping: elastikon 2 lateral stirrups and 1 heel lock and teardrop  5 min    Talar post mob, Gd III, w/ AAROM DF 5 min    Cuboid dorsal glide gd 2-3 2 min    TC distraction 2 min              NMR re-education (73946)  CUES NEEDED   SLB 3-way kick    SLB turn head    SLB airex    Footprints: ll <> V x10 Cued for inc deep rot and dec glute , inc anterior core to control pelvic tilt   Footprints: L ll R V; R ll L V x10    Footprints: ll plie turnout, turn in, up x10    Footprints: ll plie V up, V plie ll up x10                        Therapeutic Activity (77824)                                         Therapeutic Exercise and NMR EXR  [x] (84551) Provided verbal/tactile cueing for activities related to strengthening, flexibility, endurance, ROM for improvements in LE, proximal hip, and core control with self care, mobility, lifting, ambulation. [x] (60335) Provided verbal/tactile cueing for activities related to improving balance, coordination, kinesthetic sense, posture, motor skill, proprioception  to assist with LE, proximal hip, and core control in self care, mobility, lifting, ambulation and eccentric single leg control.      NMR and Therapeutic Activities:    [] (56464 or 70762) Provided verbal/tactile cueing for activities related to improving balance, coordination, kinesthetic sense, posture, motor skill, proprioception and motor activation to allow for proper function of core, proximal hip and LE with self care and ADLs  [] (94315) Gait Re-education- Provided training and instruction to the patient for proper LE, core and proximal hip recruitment and positioning and eccentric body weight control with ambulation re-education including up and down stairs     Home Exercise Program:    [] (95456) Reviewed/Progressed HEP activities related to strengthening, flexibility, endurance, ROM of core, proximal hip and LE for functional self-care, mobility, lifting and ambulation/stair navigation   [] (79724)Reviewed/Progressed HEP activities related to improving balance, coordination, kinesthetic sense, posture, motor skill, proprioception of core, proximal hip and LE for self care, mobility, lifting, and ambulation/stair navigation      Access Code: [email or web address]  URL: [email or web address]  Date: 07/07/2021  Prepared by: Ambika Jorgensen    Exercises  Ankle and Toe Plantarflexion with Resistance - 1 x daily - 7 x weekly - 3 sets - 10 reps  Long Sitting Ankle Eversion with Resistance - 1 x daily - 7 x weekly - 3 sets - 10 reps  Long Sitting Ankle Inversion with Resistance - 1 x daily - 7 x weekly - 3 sets - 10 reps  Long Sitting Ankle Dorsiflexion with Anchored Resistance - 1 x daily - 7 x weekly - 3 sets - 10 reps  Sidelying Hip Abduction with Resistance at Thighs - 1 x daily - 7 x weekly - 3 sets - 10 reps  Sidelying Feet Elevated Clamshells - 1 x daily - 7 x weekly - 3 sets - 10 reps  Single Leg Balance with Clock Reach - 1 x daily - 7 x weekly - 3 sets - 1 reps - 30 sec hold  Single Leg Balance with Eyes Closed - 1 x daily - 7 x weekly - 3 sets - 1 reps - 30 sec hold  Single Leg Stance on Foam Pad - 1 x daily - 7 x weekly - 3 sets - 1 reps - 30 sec hold  Seated Heel Raise - 1 x daily - 7 x weekly - 3 sets - 10 reps  Seated Toe Curl - 1 x daily - 7 x weekly - 3 sets - 10 reps  Seated Lesser Toes Extension - 1 x daily - 7 x weekly - 3 sets - 10 reps  Seated Arch Lifts - 1 x daily - 7 x weekly - 3 sets - 10 reps    Manual Treatments:  PROM / STM / Oscillations-Mobs:  G-I, II, III, IV (PA's, Inf., Post.)  [x] (99729) Provided manual therapy to mobilize LE, proximal hip and/or LS spine soft tissue/joints for the purpose of modulating pain, promoting relaxation,  increasing ROM, reducing/eliminating soft tissue swelling/inflammation/restriction, improving soft tissue extensibility and allowing for proper ROM for normal function with self care, mobility, lifting and ambulation. Modalities:     [] GAME READY (VASO)- for significant edema, swelling, pain control. Charges  Timed Code Treatment Minutes: 45 min   Total Treatment Minutes: 45 min     [] EVAL (LOW) 74053   [] EVAL (MOD) 72242  [] EVAL (HIGH) 07036   [] RE-EVAL     [x] CZ(67474) x 1    [] IONTO  [x] NMR (49958) x 1   [] VASO  [x] Manual (81831) x  1    [] Other:  [] TA x      [] Mech Traction (63178)  [] ES(attended) (28563)      [] ES (un) (23434):       GOALS:   Patient stated goal: get back to dancing without pain  [] Progressing: [] Met: [] Not Met: [] Adjusted    Therapist goals for Patient:   Short Term Goals: To be achieved in: 2 weeks  1. Independent in HEP and progression per patient tolerance, in order to prevent re-injury. [] Progressing: [] Met: [] Not Met: [] Adjusted  2. Patient will have a decrease in pain to facilitate improvement in movement, function, and ADLs as indicated by Functional Deficits. [] Progressing: [] Met: [] Not Met: [] Adjusted    Long Term Goals: To be achieved in: 6 weeks  1. Disability index score of 10% or less for the LEFS to assist with reaching prior level of function. [] Progressing: [] Met: [] Not Met: [] Adjusted  2. Patient will demonstrate increased AROM to WNL and symmetrical to other limb to allow for proper joint functioning as indicated by patients Functional Deficits. [] Progressing: [] Met: [] Not Met: [] Adjusted  3.  Patient will demonstrate an increase in Strength to good proximal hip strength and control, within 5lb HHD in LE to allow for proper functional mobility as indicated by patients Functional Deficits. [] Progressing: [] Met: [] Not Met: [] Adjusted  4. Patient will be able to ascend and descend flight of stairs with normal reciprocal pattern without increased symptoms or restriction. [] Progressing: [] Met: [] Not Met: [] Adjusted  5. 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 [] Progressing: [] Met: [] Not Met: [] Adjusted    Progression Towards Functional goals:  [] Patient is progressing as expected towards functional goals listed. [] Progression is slowed due to complexities listed. [] Progression has been slowed due to co-morbidities. [x] Plan just implemented, too soon to assess goals progression  [] Other:         Overall Progression Towards Functional goals/ Treatment Progress Update:  [] Patient is progressing as expected towards functional goals listed. [] Progression is slowed due to complexities/Impairments listed. [] Progression has been slowed due to co-morbidities.   [x] Plan just implemented, too soon to assess goals progression <30days   [] Goals require adjustment due to lack of progress  [] Patient is not progressing as expected and requires additional follow up with physician  [] Other    Prognosis for POC: [x] Good [] Fair  [] Poor      Patient requires continued skilled intervention: [x] Yes  [] No    Treatment/Activity Tolerance:  [x] Patient able to complete treatment  [] Patient limited by fatigue  [] Patient limited by pain    [] Patient limited by other medical complications  [] Other:     ASSESSMENT:A After paola significant cueing pt was able to neuromusculars have better activation of using her deep hip rotators for turnout which in effect helped her control her turnout so that she was not pronating through her midfoot which is likely part of the mechanical fault behind her ankle and foot pain. Return to Play: (if applicable)   [x]  Stage 1: Intro to Strength   []  Stage 2: Return to Run and Strength   []  Stage 3: Return to Jump and Strength   []  Stage 4: Dynamic Strength and Agility   []  Stage 5: Sport Specific Training     []  Ready to Return to Play, Meets All Above Stages   []  Not Ready for Return to Sports   Comments:                               PLAN: See win. Pt to be seen 2x week for 6 weeks. May also work with [facility] for general conditioning. [x] Continue per plan of care [] Alter current plan (see comments above)  [] Plan of care initiated [] Hold pending MD visit [] Discharge      Electronically signed by:  Faraz Cuenca, PT, DPT, AT, OCS [id number]    Note: If patient does not return for scheduled/ recommended follow up visits, this note will serve as a discharge from care along with most recent update on progress.

## 2021-07-16 ENCOUNTER — TELEPHONE (OUTPATIENT)
Dept: ORTHOPEDIC SURGERY | Age: 21
End: 2021-07-16

## 2021-07-16 ENCOUNTER — HOSPITAL ENCOUNTER (OUTPATIENT)
Dept: PHYSICAL THERAPY | Age: 21
Setting detail: THERAPIES SERIES
Discharge: HOME | End: 2021-07-16

## 2021-07-16 PROCEDURE — 97140 MANUAL THERAPY 1/> REGIONS: CPT

## 2021-07-16 PROCEDURE — 97110 THERAPEUTIC EXERCISES: CPT

## 2021-07-16 NOTE — FLOWSHEET NOTE
The [address], [address]  Phone: [phone]  Fax [phone]        Physical Therapy Treatment Note/ Progress Report:           Date:  2021    Patient Name:  Waldo Schlatter    :  2000  MRN: [id number]  Restrictions/Precautions:    Medical/Treatment Diagnosis Information:  Diagnosis: M25.572 L ankle pain  Treatment Diagnosis: M25.572 L ankle pain, M62.82 L LE weakness, M25.672 L ankle stiffness  Insurance/Certification information:  PT Insurance Information: [place]  Physician Information:  Referring Practitioner: Dr. Richi Streeter  Has the plan of care been signed (Y/N):        []  Yes  [x]  No     Date of Patient follow up with Physician: to get scheduled with Dr. Nikky Estrada next week      Is this a Progress Report:     []  Yes  [x]  No        If Yes:  Date Range for reporting period:  Beginning 21  Ending     Progress report will be due (10 Rx or 30 days whichever is LKQV):      Recertification will be due (POC Duration  / 90 days whichever is less): 21     Visit # Insurance Allowable Auth Required   In-person 4 ? []  Yes []  No    Telehealth - ? []  Yes []  No    Total          Functional Scale: LEFS: 65/80 = 81% ability, 19% disability  Date assessed:  21         Number of Comorbidities:  [x]0     []1-2    []3+    Latex Allergy:  [x]NO      []YES  Preferred Language for Healthcare:   [x]English       []other:      Pain level:  0/10 at rest, 5/10 with dancing and walking    SUBJECTIVE:  Pt reports the ankle has been feeling better. She noticed she was able to do releve on the L LE in class yesterday and this morning pain free or nearly pain free for the 1st time in weeks.         OBJECTIVE:    Observation: point tender L sinus tarsi, mild talar posterior glide hypomobility, mild L cuboid drop   Test measurements:    OBJECTIVE:      ROM LEFT  21 RIGHT  21 Left  21 Ankle  deg 100 deg    Ankle DF knee straight 15 deg pain 10 deg 15 deg pain free   Ankle DF knee bent 14 deg 14 deg 20 deg pain free   Ankle In 22 deg pain 28 deg 33 deg pain free   Ankle Ev 12 deg 12 deg    Strength  LEFT RIGHT    HIP Flexors 5/5 5/5    HIP Abductors 5- 5-    HIP Ext 5 5    Hip ER 3+ 4    Hip IR 3+ 4    Knee EXT (quad)        Knee Flex (HS)        Ankle DF 4/5 5/5    Ankle PF 4+ 5    Ankle Inv 4 5    Ankle EV 4+ 5-    Toes flex 4- 5    Toes ext 4/5 5/5            RESTRICTIONS/PRECAUTIONS: none    Exercises/Interventions:     Therapeutic Ex (47374) Sets/Reps/ sec Notes/CUES   Clamshell w/ feet up    S/L hip abd w/ ext \"L\"    Bridge with abd     Foot intrinsics: toe curls, toes ext, doming    Ankle t-band 5 way with foot pointing    Seated releve with tennis ball    Side stepping     BAPS: DF/PF, inv/ev, CW/CCW WB, L2 2x10ea    Heel raise up 2 down 1 2x10    Heel raise 3 way (neutral, IR, ER) 2x10 ea    Hip hikes 2x15 R/L 4\" step   S/L ll passe, V passe, developpe lower, enveloppe, V passe, ll passe, lower x8 R/L    Side plank clamshell nv    Heel raise on incline & on decline nv    Steamboats nv In parallel & turned out   S/L ankle eversion & inversion nv 1-2 lbs             Glute cross over stretch 2x30\" R/L    Fig 4 stretch 2x30\"    G/S slant board stretch 2x30\"ea    Pt edu: kinetic chain, use of hip for turnout and support, activity modification     Manual Intervention (41371) 12 min total    Taping: elastikon 2 lateral stirrups and 1 heel lock and teardrop  5 min    Talar post mob, Gd III, w/ AAROM DF 5 min    Distal fib posterior glide with PROM DF 2 min    Cuboid dorsal glide gd 2-3                  NMR re-education (41549)  CUES NEEDED   SLB 3-way kick    SLB turn head    SLB airex    Footprints: ll <> V Cued for inc deep rot and dec glute , inc anterior core to control pelvic tilt   Footprints: L ll R V; R ll L V    Footprints: mattie dacosta turnout, turn in, up    Footprints: mattie dacosta V up, V plie ll up    SLB cup pick-up nv                   Therapeutic Activity (14446)                                         Therapeutic Exercise and NMR EXR  [x] (66728) Provided verbal/tactile cueing for activities related to strengthening, flexibility, endurance, ROM for improvements in LE, proximal hip, and core control with self care, mobility, lifting, ambulation.  [] (23252) Provided verbal/tactile cueing for activities related to improving balance, coordination, kinesthetic sense, posture, motor skill, proprioception  to assist with LE, proximal hip, and core control in self care, mobility, lifting, ambulation and eccentric single leg control. NMR and Therapeutic Activities:    [] (24935 or 94069) Provided verbal/tactile cueing for activities related to improving balance, coordination, kinesthetic sense, posture, motor skill, proprioception and motor activation to allow for proper function of core, proximal hip and LE with self care and ADLs  [] (72258) Gait Re-education- Provided training and instruction to the patient for proper LE, core and proximal hip recruitment and positioning and eccentric body weight control with ambulation re-education including up and down stairs     Home Exercise Program:    [x] (86423) Reviewed/Progressed HEP activities related to strengthening, flexibility, endurance, ROM of core, proximal hip and LE for functional self-care, mobility, lifting and ambulation/stair navigation   [] (33857)Reviewed/Progressed HEP activities related to improving balance, coordination, kinesthetic sense, posture, motor skill, proprioception of core, proximal hip and LE for self care, mobility, lifting, and ambulation/stair navigation      Access Code: [email or web address]  URL: [email or web address]  Date: 07/16/2021  Prepared by: Giuseppe Batista    Exercises  Hip Hiking on Step - 1 x daily - 7 x weekly - 2 sets - 15 reps  Standing Eccentric Heel Raise - 1 x daily - 7 x weekly - 2 sets - 10 achieved in: 6 weeks  1. Disability index score of 10% or less for the LEFS to assist with reaching prior level of function. [] Progressing: [] Met: [] Not Met: [] Adjusted  2. Patient will demonstrate increased AROM to WNL and symmetrical to other limb to allow for proper joint functioning as indicated by patients Functional Deficits. [] Progressing: [x] Met: [] Not Met: [] Adjusted  3. Patient will demonstrate an increase in Strength to good proximal hip strength and control, within 5lb HHD in LE to allow for proper functional mobility as indicated by patients Functional Deficits. [] Progressing: [] Met: [] Not Met: [] Adjusted  4. Patient will be able to ascend and descend flight of stairs with normal reciprocal pattern without increased symptoms or restriction. [] Progressing: [] Met: [] Not Met: [] Adjusted  5. 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 [] Progressing: [] Met: [] Not Met: [] Adjusted    Progression Towards Functional goals:  [x] Patient is progressing as expected towards functional goals listed. [] Progression is slowed due to complexities listed. [] Progression has been slowed due to co-morbidities. [] Plan just implemented, too soon to assess goals progression  [] Other:         Overall Progression Towards Functional goals/ Treatment Progress Update:  [x] Patient is progressing as expected towards functional goals listed. [] Progression is slowed due to complexities/Impairments listed. [] Progression has been slowed due to co-morbidities.   [] Plan just implemented, too soon to assess goals progression <30days   [] Goals require adjustment due to lack of progress  [] Patient is not progressing as expected and requires additional follow up with physician  [] Other    Prognosis for POC: [x] Good [] Fair  [] Poor      Patient requires continued skilled intervention: [x] Yes  [] No    Treatment/Activity Tolerance:  [x] Patient able to complete treatment  [] Patient limited by fatigue  [] Patient limited by pain    [] Patient limited by other medical complications  [] Other:     ASSESSMENT:Pt is making good progress. She now has full AROM all pain free the L ankle. She is now able to perform at least 10 reps DL heel raise without pain but still fatigues easily with SL releve on the L LE which eventually leads to pain in the ankle. Her hips continue to be week and lead to over stress at the ankle but with strengthening they are showing improvement. Return to Play: (if applicable)   [x]  Stage 1: Intro to Strength   []  Stage 2: Return to Run and Strength   []  Stage 3: Return to Jump and Strength   []  Stage 4: Dynamic Strength and Agility   []  Stage 5: Sport Specific Training     []  Ready to Return to Play, Meets All Above Stages   []  Not Ready for Return to Sports   Comments:                               PLAN: See win. Pt to be seen 2x week for 6 weeks. May also work with [employer] for general conditioning. [x] Continue per plan of care [] Alter current plan (see comments above)  [] Plan of care initiated [] Hold pending MD visit [] Discharge      Electronically signed by:  Reyna Arthur PT, DPT, AT, OCS [id number]    Note: If patient does not return for scheduled/ recommended follow up visits, this note will serve as a discharge from care along with most recent update on progress.

## 2021-07-21 ENCOUNTER — HOSPITAL ENCOUNTER (OUTPATIENT)
Dept: PHYSICAL THERAPY | Age: 21
Setting detail: THERAPIES SERIES
Discharge: HOME | End: 2021-07-21

## 2021-07-21 NOTE — FLOWSHEET NOTE
[place] [address]  Phone: [phone]  Fax [phone]        Physical Therapy Treatment Note/ Progress Report:           Date:  2021    Patient Name:  Rosalie Costa    :  2000  MRN: [id number]  Restrictions/Precautions:    Medical/Treatment Diagnosis Information:  Diagnosis: M25.572 L ankle pain  Treatment Diagnosis: M25.572 L ankle pain, M62.82 L LE weakness, M25.672 L ankle stiffness  Insurance/Certification information:  PT Insurance Information: [place]  Physician Information:  Referring Practitioner: Dr. Gali Massey  Has the plan of care been signed (Y/N):        []  Yes  [x]  No     Date of Patient follow up with Physician: to get scheduled with Dr. Ashly Moya next week      Is this a Progress Report:     []  Yes  [x]  No        If Yes:  Date Range for reporting period:  Beginning 21  Ending     Progress report will be due (10 Rx or 30 days whichever is JNIA):36      Recertification will be due (POC Duration  / 90 days whichever is less): 21     Visit # Insurance Allowable Auth Required   In-person 4 ? []  Yes []  No    Telehealth - ? []  Yes []  No    Total          Functional Scale: LEFS: 65/80 = 81% ability, 19% disability  Date assessed:  21         Number of Comorbidities:  [x]0     []1-2    []3+    Latex Allergy:  [x]NO      []YES  Preferred Language for Healthcare:   [x]English       []other:      Pain level:  0/10 at rest, 5/10 with dancing and walking    SUBJECTIVE:  Pt reports the ankle has been feeling better. Reports no pain during technique classes last week so started some of the jumping this week. Reports no issues on Monday and Tuesday with jumps. Had some pain today during jumps in technique this am ,so held. Didn't do any jumps in contemporary today.     OBJECTIVE:    Observation: point tender L sinus tarsi, mild talar posterior glide hypomobility, mild L cuboid drop   Test measurements:    OBJECTIVE:      ROM LEFT  7/2/21 RIGHT  7/2/21 Left  7/16/21   Ankle  deg 100 deg    Ankle DF knee straight 15 deg pain 10 deg 15 deg pain free   Ankle DF knee bent 14 deg 14 deg 20 deg pain free   Ankle In 22 deg pain 28 deg 33 deg pain free   Ankle Ev 12 deg 12 deg    Strength  LEFT RIGHT    HIP Flexors 5/5 5/5    HIP Abductors 5- 5-    HIP Ext 5 5    Hip ER 3+ 4    Hip IR 3+ 4    Knee EXT (quad)        Knee Flex (HS)        Ankle DF 4/5 5/5    Ankle PF 4+ 5    Ankle Inv 4 5    Ankle EV 4+ 5-    Toes flex 4- 5    Toes ext 4/5 5/5            RESTRICTIONS/PRECAUTIONS: none    Exercises/Interventions:     Therapeutic Ex (72301) Sets/Reps/ sec Notes/CUES   Clamshell w/ feet up    S/L hip abd w/ ext \"L\"    Bridge with abd     Foot intrinsics: toe curls, toes ext, doming    Ankle t-band 5 way with foot pointing    Seated releve with tennis ball    Side stepping     BAPS: DF/PF, inv/ev, CW/CCW     Heel raise up 2 down 1 2x10    Heel raise 3 way (neutral, IR, ER)    Hip hikes  4\" step   S/L ll passe, V passe, developpe lower, enveloppe, V passe, ll passe, lower     Side plank clamshell 2x15 reps R/L   Heel raise on incline & on decline 2x10 reps    Steamboats 2x10  In parallel & turned out   S/L ankle eversion & inversion 2x10 reps 2 lbs             Glute cross over stretch 2x30\" R/L    Fig 4 stretch 2x30\"    G/S slant board stretch 2x30\"ea    Pt edu: kinetic chain, use of hip for turnout and support, activity modification     Manual Intervention (13581) 12 min total    Taping: elastikon 2 lateral stirrups and 1 heel lock and teardrop     Talar post mob, Gd III, w/ AAROM DF 5 min In standing with Wedge with knee  4Z87 reps  (cavitation noted)   Distal fib posterior glide with PROM DF 2 min    Cuboid dorsal glide gd 2-3                  NMR re-education (88002)  CUES NEEDED   SLB 3-way kick    SLB turn head    SLB airex    Footprints: ll <> V Cued for inc deep rot and dec glute , inc anterior core to control pelvic tilt   Footprints: L ll R V; R ll L V    Footprints: ll plie turnout, turn in, up    Footprints: ll plie V up, V plie ll up    SLB cup pick-ups x5 reps                   Therapeutic Activity (54539)                                         Therapeutic Exercise and NMR EXR  [x] (00014) Provided verbal/tactile cueing for activities related to strengthening, flexibility, endurance, ROM for improvements in LE, proximal hip, and core control with self care, mobility, lifting, ambulation.  [] (68760) Provided verbal/tactile cueing for activities related to improving balance, coordination, kinesthetic sense, posture, motor skill, proprioception  to assist with LE, proximal hip, and core control in self care, mobility, lifting, ambulation and eccentric single leg control. NMR and Therapeutic Activities:    [x] (99944 or 00711) Provided verbal/tactile cueing for activities related to improving balance, coordination, kinesthetic sense, posture, motor skill, proprioception and motor activation to allow for proper function of core, proximal hip and LE with self care and ADLs  [] (32989) Gait Re-education- Provided training and instruction to the patient for proper LE, core and proximal hip recruitment and positioning and eccentric body weight control with ambulation re-education including up and down stairs     Home Exercise Program:    [x] (44552) Reviewed/Progressed HEP activities related to strengthening, flexibility, endurance, ROM of core, proximal hip and LE for functional self-care, mobility, lifting and ambulation/stair navigation   [] (20022)Reviewed/Progressed HEP activities related to improving balance, coordination, kinesthetic sense, posture, motor skill, proprioception of core, proximal hip and LE for self care, mobility, lifting, and ambulation/stair navigation      Access Code: [email or web address]  URL: [email or web address]  Date: 07/16/2021  Prepared by: Corrine Talley    Exercises  Hip Hiking on Step - 1 x daily - 7 x weekly - 2 sets - 15 reps  Standing Eccentric Heel Raise - 1 x daily - 7 x weekly - 2 sets - 10 reps  Sidelying Hip Abduction - 1 x daily - 7 x weekly - 2 sets - 8 reps  Standing Heel Raise - 1 x daily - 7 x weekly - 2 sets - 10 reps  Soleus Stretch on Wall - 1 x daily - 7 x weekly - 3 sets - 1 reps - 30 sec hold  Gastroc Stretch on Wall - 1 x daily - 7 x weekly - 3 sets - 1 reps - 30 sec hold  Supine Gluteus Stretch - 1 x daily - 7 x weekly - 3 sets - 1 reps - 30 sec hold  Supine Figure 4 Piriformis Stretch - 1 x daily - 7 x weekly - 3 sets - 1 reps - 30 sec hold      Manual Treatments:  PROM / STM / Oscillations-Mobs:  G-I, II, III, IV (PA's, Inf., Post.)  [x] (83079) Provided manual therapy to mobilize LE, proximal hip and/or LS spine soft tissue/joints for the purpose of modulating pain, promoting relaxation,  increasing ROM, reducing/eliminating soft tissue swelling/inflammation/restriction, improving soft tissue extensibility and allowing for proper ROM for normal function with self care, mobility, lifting and ambulation. Modalities:     [] GAME READY (VASO)- for significant edema, swelling, pain control. Charges  Timed Code Treatment Minutes: 45' min   Total Treatment Minutes: 45' min     [] EVAL (LOW) 01290   [] EVAL (MOD) 93948  [] EVAL (HIGH) 42622   [] RE-EVAL     [x] CC(93320) x 2   [] IONTO  [] NMR (97983) x    [] VASO  [x] Manual (07679) x  1    [] Other:  [] TA x      [] Mech Traction (01480)  [] ES(attended) (40764)      [] ES (un) (91644):       GOALS:   Patient stated goal: get back to dancing without pain  [] Progressing: [] Met: [] Not Met: [] Adjusted    Therapist goals for Patient:   Short Term Goals: To be achieved in: 2 weeks  1. Independent in HEP and progression per patient tolerance, in order to prevent re-injury. [] Progressing: [x] Met: [] Not Met: [] Adjusted  2.  Patient will have a decrease in pain to facilitate improvement in movement, function, and ADLs as indicated by Functional Deficits. [x] Progressing: [] Met: [] Not Met: [] Adjusted    Long Term Goals: To be achieved in: 6 weeks  1. Disability index score of 10% or less for the LEFS to assist with reaching prior level of function. [] Progressing: [] Met: [] Not Met: [] Adjusted  2. Patient will demonstrate increased AROM to WNL and symmetrical to other limb to allow for proper joint functioning as indicated by patients Functional Deficits. [] Progressing: [x] Met: [] Not Met: [] Adjusted  3. Patient will demonstrate an increase in Strength to good proximal hip strength and control, within 5lb HHD in LE to allow for proper functional mobility as indicated by patients Functional Deficits. [] Progressing: [] Met: [] Not Met: [] Adjusted  4. Patient will be able to ascend and descend flight of stairs with normal reciprocal pattern without increased symptoms or restriction. [] Progressing: [] Met: [] Not Met: [] Adjusted  5. 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 [] Progressing: [] Met: [] Not Met: [] Adjusted    Progression Towards Functional goals:  [x] Patient is progressing as expected towards functional goals listed. [] Progression is slowed due to complexities listed. [] Progression has been slowed due to co-morbidities. [] Plan just implemented, too soon to assess goals progression  [] Other:         Overall Progression Towards Functional goals/ Treatment Progress Update:  [x] Patient is progressing as expected towards functional goals listed. [] Progression is slowed due to complexities/Impairments listed. [] Progression has been slowed due to co-morbidities.   [] Plan just implemented, too soon to assess goals progression <30days   [] Goals require adjustment due to lack of progress  [] Patient is not progressing as expected and requires additional follow up with physician  [] Other    Prognosis for POC: [x] Good [] Fair  [] Poor      Patient requires continued skilled intervention: [x] Yes  [] No    Treatment/Activity Tolerance:  [x] Patient able to complete treatment  [] Patient limited by fatigue  [] Patient limited by pain    [] Patient limited by other medical complications  [] Other:     ASSESSMENT:Pt is making good progress. She now has full AROM all pain free the L ankle. She is now able to perform at least 10 reps DL heel raise without pain but still fatigues easily with SL releve on the L LE which eventually leads to pain in the ankle. Her hips continue to be weak and lead to over stress at the ankle but with strengthening they are showing improvement. Return to Play: (if applicable)   [x]  Stage 1: Intro to Strength   []  Stage 2: Return to Run and Strength   []  Stage 3: Return to Jump and Strength   []  Stage 4: Dynamic Strength and Agility   []  Stage 5: Sport Specific Training     []  Ready to Return to Play, Meets All Above Stages   []  Not Ready for Return to Sports   Comments:                               PLAN: See eval. Pt to be seen 2x week for 6 weeks. May also work with [employer] for general conditioning. [x] Continue per plan of care [] Alter current plan (see comments above)  [] Plan of care initiated [] Hold pending MD visit [] Discharge      Electronically signed by:  Cal Morrow PT, OMT-C [id number]    Note: If patient does not return for scheduled/ recommended follow up visits, this note will serve as a discharge from care along with most recent update on progress.

## 2021-07-23 ENCOUNTER — HOSPITAL ENCOUNTER (OUTPATIENT)
Dept: PHYSICAL THERAPY | Age: 21
Setting detail: THERAPIES SERIES
Discharge: HOME | End: 2021-07-23

## 2021-07-23 NOTE — FLOWSHEET NOTE
[facility] [facility], [address]        Physical Therapy  Cancellation/No-show Note  Patient Name:  Corrinne Gamma  :  2000   Date:  2021  Cancelled visits to date: 0  No-shows to date: 1    For today's appointment patient:  []  Cancelled  []  Rescheduled appointment  [x]  No-show     Reason given by patient:  []  Patient ill  []  Conflicting appointment  []  No transportation    []  Conflict with work  []  No reason given  []  Other:     Comments:      Electronically signed by:  Harmony Cardenas PT

## 2021-07-27 ENCOUNTER — TELEPHONE (OUTPATIENT)
Dept: ORTHOPEDIC SURGERY | Age: 21
End: 2021-07-27

## 2021-07-27 NOTE — TELEPHONE ENCOUNTER
L/m on patient's v/m inquiring about scheduling MRI of left foot and MRI of left ankle. Informed patient MRI authorizations are only valid until 8/5/21. Scans will need to be completed prior to this date or authorization will have to be re-obtained. She was asked to call back with any further questions/concerns.

## 2021-07-28 ENCOUNTER — HOSPITAL ENCOUNTER (OUTPATIENT)
Dept: PHYSICAL THERAPY | Age: 21
Setting detail: THERAPIES SERIES
Discharge: HOME | End: 2021-07-28

## 2021-07-28 PROCEDURE — 97112 NEUROMUSCULAR REEDUCATION: CPT

## 2021-07-28 PROCEDURE — 97140 MANUAL THERAPY 1/> REGIONS: CPT

## 2021-07-28 PROCEDURE — 97110 THERAPEUTIC EXERCISES: CPT

## 2021-07-28 NOTE — FLOWSHEET NOTE
[facility] [address]  Phone: [phone]  Fax [phone]        Physical Therapy Treatment Note/ Progress Report:           Date:  2021    Patient Name:  Tex Stacy    :  2000  MRN: [id number]  Restrictions/Precautions:    Medical/Treatment Diagnosis Information:  Diagnosis: M25.572 L ankle pain  Treatment Diagnosis: M25.572 L ankle pain, M62.82 L LE weakness, M25.672 L ankle stiffness  Insurance/Certification information:  PT Insurance Information: [employer]  Physician Information:  Referring Practitioner: Dr. Bakari Woodruff  Has the plan of care been signed (Y/N):        []  Yes  [x]  No     Date of Patient follow up with Physician: to get scheduled with Dr. Elle Bass next week      Is this a Progress Report:     []  Yes  [x]  No        If Yes:  Date Range for reporting period:  Beginning 21  Ending     Progress report will be due (10 Rx or 30 days whichever is [facility]):      Recertification will be due (POC Duration  / 90 days whichever is less): 21     Visit # Insurance Allowable Auth Required   In-person 5 ? []  Yes []  No    Telehealth - ? []  Yes []  No    Total          Functional Scale: LEFS: 65/80 = 81% ability, 19% disability  Date assessed:  21         Number of Comorbidities:  [x]0     []1-2    []3+    Latex Allergy:  [x]NO      []YES  Preferred Language for Healthcare:   [x]English       []other:      Pain level:  0/10 at rest & ADLs, 2-3/10 with dancing    SUBJECTIVE:  Pt reports the ankle has been feeling a lot better. She was able to progress back to some pointe work last week and did it with little to no pain which she is really happy about. It still feels jammed in the back of the ankle though sometimes with releve work.      OBJECTIVE:    Observation: very mild point tender L sinus tarsi, very mild talar posterior glide hypomobility, distal fib post glide WNL no L cuboid drop     Test measurements:    OBJECTIVE:      ROM LEFT  7/2/21 RIGHT  7/2/21 Left  7/16/21   Ankle  deg 100 deg    Ankle DF knee straight 15 deg pain 10 deg 15 deg pain free   Ankle DF knee bent 14 deg 14 deg 20 deg pain free   Ankle In 22 deg pain 28 deg 33 deg pain free   Ankle Ev 12 deg 12 deg    Strength  LEFT RIGHT    HIP Flexors 5/5 5/5    HIP Abductors 5- 5-    HIP Ext 5 5    Hip ER 3+ 4    Hip IR 3+ 4    Knee EXT (quad)        Knee Flex (HS)        Ankle DF 4/5 5/5    Ankle PF 4+ 5    Ankle Inv 4 5    Ankle EV 4+ 5-    Toes flex 4- 5    Toes ext 4/5 5/5            RESTRICTIONS/PRECAUTIONS: none    Exercises/Interventions:     Therapeutic Ex (59810) Sets/Reps/ sec Notes/CUES   Clamshell w/ feet up    S/L hip abd w/ ext \"L\"    Bridge with abd     Foot intrinsics: toe curls, toes ext, doming    Ankle t-band 5 way with foot pointing    Seated releve with tennis ball    Side stepping     BAPS: DF/PF, inv/ev, CW/CCW WB, L2 2x10ea    Heel raise up 2 down 1     Heel raise 3 way (neutral, IR, ER)    Hip hikes  4\" step   S/L ll passe, V passe, developpe lower, enveloppe, V passe, ll passe, lower x10 R/L    Side plank clamshell 2x15 reps R/L   Heel raise on incline & on decline 2x10 reps SL    S/L ankle eversion & inversion  2 lbs   Power lunges ll & V x10 ea    Power lunge clocks ll & V x5 ea    Glute cross over stretch    Fig 4 stretch    G/S slant board stretch 2x30\"ea    Pt edu: kinetic chain, use of hip for turnout and support, activity modification     Manual Intervention (58662) 8 min total    Taping: elastikon 2 lateral stirrups and 1 heel lock and teardrop     Talar post mob, Gd III, w/ AAROM DF 5 min In standing with Wedge with knee  1K70 reps  (cavitation noted)   Distal fib posterior glide with PROM DF 2 min    Cuboid dorsal glide gd 2-3        DF AAROM with static manual stretch 2x5x30\"         NMR re-education (71776)  CUES NEEDED   SLB 3-way kick    SLB turn head    SLB airex Footprints: ll <> V Cued for inc deep rot and dec glute , inc anterior core to control pelvic tilt   Footprints: L ll R V; R ll L V    Footprints: ll plie turnout, turn in, up    Footprints: ll plie V up, V plie ll up    SLB cup pick-ups     Steamboats on decline Red x20 ea L stance    SLB BOSU x30\" ea side         Therapeutic Activity (23562)                                         Therapeutic Exercise and NMR EXR  [x] (87822) Provided verbal/tactile cueing for activities related to strengthening, flexibility, endurance, ROM for improvements in LE, proximal hip, and core control with self care, mobility, lifting, ambulation.  [] (51649) Provided verbal/tactile cueing for activities related to improving balance, coordination, kinesthetic sense, posture, motor skill, proprioception  to assist with LE, proximal hip, and core control in self care, mobility, lifting, ambulation and eccentric single leg control.       NMR and Therapeutic Activities:    [x] (92392 or 64254) Provided verbal/tactile cueing for activities related to improving balance, coordination, kinesthetic sense, posture, motor skill, proprioception and motor activation to allow for proper function of core, proximal hip and LE with self care and ADLs  [] (02370) Gait Re-education- Provided training and instruction to the patient for proper LE, core and proximal hip recruitment and positioning and eccentric body weight control with ambulation re-education including up and down stairs     Home Exercise Program:    [x] (79884) Reviewed/Progressed HEP activities related to strengthening, flexibility, endurance, ROM of core, proximal hip and LE for functional self-care, mobility, lifting and ambulation/stair navigation   [] (39523)Reviewed/Progressed HEP activities related to improving balance, coordination, kinesthetic sense, posture, motor skill, proprioception of core, proximal hip and LE for self care, mobility, lifting, and ambulation/stair navigation      Access Code: [email or web address]  URL: [email or web address]  Date: 07/16/2021  Prepared by: Maudine Severance    Exercises  Hip Hiking on Step - 1 x daily - 7 x weekly - 2 sets - 15 reps  Standing Eccentric Heel Raise - 1 x daily - 7 x weekly - 2 sets - 10 reps  Sidelying Hip Abduction - 1 x daily - 7 x weekly - 2 sets - 8 reps  Standing Heel Raise - 1 x daily - 7 x weekly - 2 sets - 10 reps  Soleus Stretch on Wall - 1 x daily - 7 x weekly - 3 sets - 1 reps - 30 sec hold  Gastroc Stretch on Wall - 1 x daily - 7 x weekly - 3 sets - 1 reps - 30 sec hold  Supine Gluteus Stretch - 1 x daily - 7 x weekly - 3 sets - 1 reps - 30 sec hold  Supine Figure 4 Piriformis Stretch - 1 x daily - 7 x weekly - 3 sets - 1 reps - 30 sec hold      Manual Treatments:  PROM / STM / Oscillations-Mobs:  G-I, II, III, IV (PA's, Inf., Post.)  [x] (30071) Provided manual therapy to mobilize LE, proximal hip and/or LS spine soft tissue/joints for the purpose of modulating pain, promoting relaxation,  increasing ROM, reducing/eliminating soft tissue swelling/inflammation/restriction, improving soft tissue extensibility and allowing for proper ROM for normal function with self care, mobility, lifting and ambulation. Modalities:     [] GAME READY (VASO)- for significant edema, swelling, pain control. Charges  Timed Code Treatment Minutes: 45' min   Total Treatment Minutes: 45' min     [] EVAL (LOW) 12039   [] EVAL (MOD) 31504  [] EVAL (HIGH) 42101   [] RE-EVAL     [x] GLORIA(29413) x 1   [] IONTO  [x] NMR (95940) x  1  [] VASO  [x] Manual (30320) x  1    [] Other:  [] TA x      [] Mech Traction (91033)  [] ES(attended) (84447)      [] ES (un) (79403):       GOALS:   Patient stated goal: get back to dancing without pain  [] Progressing: [] Met: [] Not Met: [] Adjusted    Therapist goals for Patient:   Short Term Goals: To be achieved in: 2 weeks  1.  Independent in HEP and progression per patient tolerance, in order to prevent re-injury. [] Progressing: [x] Met: [] Not Met: [] Adjusted  2. Patient will have a decrease in pain to facilitate improvement in movement, function, and ADLs as indicated by Functional Deficits. [x] Progressing: [] Met: [] Not Met: [] Adjusted    Long Term Goals: To be achieved in: 6 weeks  1. Disability index score of 10% or less for the LEFS to assist with reaching prior level of function. [] Progressing: [] Met: [] Not Met: [] Adjusted  2. Patient will demonstrate increased AROM to WNL and symmetrical to other limb to allow for proper joint functioning as indicated by patients Functional Deficits. [] Progressing: [x] Met: [] Not Met: [] Adjusted  3. Patient will demonstrate an increase in Strength to good proximal hip strength and control, within 5lb HHD in LE to allow for proper functional mobility as indicated by patients Functional Deficits. [] Progressing: [] Met: [] Not Met: [] Adjusted  4. Patient will be able to ascend and descend flight of stairs with normal reciprocal pattern without increased symptoms or restriction. [] Progressing: [] Met: [] Not Met: [] Adjusted  5. 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 [] Progressing: [] Met: [] Not Met: [] Adjusted    Progression Towards Functional goals:  [x] Patient is progressing as expected towards functional goals listed. [] Progression is slowed due to complexities listed. [] Progression has been slowed due to co-morbidities. [] Plan just implemented, too soon to assess goals progression  [] Other:         Overall Progression Towards Functional goals/ Treatment Progress Update:  [x] Patient is progressing as expected towards functional goals listed. [] Progression is slowed due to complexities/Impairments listed.   [] Progression has been slowed due to co-morbidities. [] Plan just implemented, too soon to assess goals progression <30days   [] Goals require adjustment due to lack of progress  [] Patient is not progressing as expected and requires additional follow up with physician  [] Other    Prognosis for POC: [x] Good [] Fair  [] Poor      Patient requires continued skilled intervention: [x] Yes  [] No    Treatment/Activity Tolerance:  [x] Patient able to complete treatment  [] Patient limited by fatigue  [] Patient limited by pain    [] Patient limited by other medical complications  [] Other:     ASSESSMENT:Pt is making excellent progress with improved ankle ROM, joint mobility, ankle strength and dynamic stability. She is making good strides towards D/C as planned. Return to Play: (if applicable)   [x]  Stage 1: Intro to Strength   []  Stage 2: Return to Run and Strength   []  Stage 3: Return to Jump and Strength   []  Stage 4: Dynamic Strength and Agility   []  Stage 5: Sport Specific Training     []  Ready to Return to Play, Meets All Above Stages   []  Not Ready for Return to Sports   Comments:                               PLAN: See win. Pt to be seen 2x week for 6 weeks. May also work with [facility] for general conditioning. [x] Continue per plan of care [] Alter current plan (see comments above)  [] Plan of care initiated [] Hold pending MD visit [] Discharge      Electronically signed by:  Odella Snellen, PT    Note: If patient does not return for scheduled/ recommended follow up visits, this note will serve as a discharge from care along with most recent update on progress.

## 2021-07-30 ENCOUNTER — HOSPITAL ENCOUNTER (OUTPATIENT)
Dept: PHYSICAL THERAPY | Age: 21
Setting detail: THERAPIES SERIES
Discharge: HOME | End: 2021-07-30

## 2021-07-30 PROCEDURE — 97112 NEUROMUSCULAR REEDUCATION: CPT

## 2021-07-30 PROCEDURE — 97110 THERAPEUTIC EXERCISES: CPT

## 2021-07-30 NOTE — PROGRESS NOTES
The [address], [address]  Phone: [phone]  Fax [phone]        Physical Therapy Treatment Note/ Progress Report:           Date:  2021    Patient Name:  Tex Stacy    :  2000  MRN: [id number]  Restrictions/Precautions:    Medical/Treatment Diagnosis Information:  Diagnosis: M25.572 L ankle pain  Treatment Diagnosis: M25.572 L ankle pain, M62.82 L LE weakness, M25.672 L ankle stiffness  Insurance/Certification information:  PT Insurance Information: [employer]  Physician Information:  Referring Practitioner: Dr. Bakari Woodruff  Has the plan of care been signed (Y/N):        []  Yes  [x]  No     Date of Patient follow up with Physician: to get scheduled with Dr. Elle Bass next week      Is this a Progress Report:     [x]  Yes  []  No        If Yes:  Date Range for reporting period:  Beginning 21  Ending  21    Progress report will be due (10 Rx or 30 days whichever is less):7 3/89/80    Recertification will be due (POC Duration  / 90 days whichever is less): 21     Visit # Insurance Allowable Auth Required   In-person 6 ? []  Yes []  No    Telehealth - ? []  Yes []  No    Total          Functional Scale: LEFS: 65/80 = 81% ability, 19% disability  Date assessed:  21         Number of Comorbidities:  [x]0     []1-2    []3+    Latex Allergy:  [x]NO      []YES  Preferred Language for Healthcare:   [x]English       []other:      Pain level:  0/10 at rest & ADLs, 2-3/10 with dancing    SUBJECTIVE:  Pt feels overall 90% improved since starting PT. She was able to do most of her dance classes yesterday with only mild pain. She can tell her hips and ankles are feeling stronger as well.      OBJECTIVE:    Observation: very mild point tender L sinus tarsi, very mild talar posterior glide hypomobility, distal fib post glide WNL no L cuboid drop     Test measurements:    OBJECTIVE:      ROM LEFT  7/2/21 RIGHT  7/2/21 Left  7/16/21   Ankle  deg 100 deg    Ankle DF knee straight 15 deg pain 10 deg 15 deg pain free   Ankle DF knee bent 14 deg 14 deg 20 deg pain free   Ankle In 22 deg pain 28 deg 33 deg pain free   Ankle Ev 12 deg 12 deg    Strength  LEFT RIGHT Left  7/30/21   HIP Flexors 5/5 5/5    HIP Abductors 5- 5-    HIP Ext 5 5    Hip ER 3+ 4 L4/5, R 4+/5   Hip IR 3+ 4 L 4/5, R 4+/5   Knee EXT (quad)        Knee Flex (HS)        Ankle DF 4/5 5/5 5/5   Ankle PF 4+ 5 5-/5   Ankle Inv 4 5 4+/5   Ankle EV 4+ 5- 5-/5   Toes flex 4- 5 5/5   Toes ext 4/5 5/5 5/5           RESTRICTIONS/PRECAUTIONS: none    Exercises/Interventions:     Therapeutic Ex (93735) Sets/Reps/ sec Notes/CUES   Clamshell w/ feet up    S/L hip abd w/ ext \"L\"    Bridge with abd     SL bridge x15 R/L   Clamshell Green 2x15 R/L Only 1/2 way back down   Prone hip IR & ER Green 2x10ea R/L    Bridge with Tball curl 2x10    Foot intrinsics: toe curls, toes ext, doming    Ankle t-band 5 way with foot pointing    Seated releve with tennis ball    Side stepping     BAPS: DF/PF, inv/ev, CW/CCW WB, L2 2x10ea    Heel raise 4-way w/ tband resistance L green 2x10 ea    Heel raise up 2 down 1     Heel raise 3 way (neutral, IR, ER)    Hip hikes  4\" step   S/L ll passe, V passe, developpe lower, enveloppe, V passe, ll passe, lower     Side plank clamshell 2x15 reps R/L   Heel raise on incline & on decline 2x10 reps SL    S/L ankle eversion & inversion  2 lbs   Power lunges ll & V x10 ea    Power lunge clocks ll & V x5 ea    Glute cross over stretch    Fig 4 stretch    G/S slant board stretch 2x30\"ea    Pt edu: kinetic chain, use of hip for turnout and support, activity modification     Manual Intervention (25581) 6 min total    Taping: elastikon 2 lateral stirrups and 1 heel lock and teardrop  3 min   Talar post mob, Gd III, w/ AAROM DF 1 min In standing with Wedge with knee  7V00 reps  (cavitation noted)   Distal fib posterior glide with PROM DF 1 min    Cuboid dorsal glide gd 2-3        DF AAROM with static manual stretch 1x5x30\"         NMR re-education (00693)  CUES NEEDED   SLB 3-way kick    SLB turn head    SLB airex    Footprints: ll <> V Cued for inc deep rot and dec glute , inc anterior core to control pelvic tilt   Footprints: L ll R V; R ll L V    Footprints: ll plie turnout, turn in, up    Footprints: ll plie V up, V plie ll up    SLB cup pick-ups     Steamboats on decline Red x20 ea L stance    SLB BOSU x30\" ea side         Therapeutic Activity (33909)                                         Therapeutic Exercise and NMR EXR  [x] (33215) Provided verbal/tactile cueing for activities related to strengthening, flexibility, endurance, ROM for improvements in LE, proximal hip, and core control with self care, mobility, lifting, ambulation. [x] (17011) Provided verbal/tactile cueing for activities related to improving balance, coordination, kinesthetic sense, posture, motor skill, proprioception  to assist with LE, proximal hip, and core control in self care, mobility, lifting, ambulation and eccentric single leg control.       NMR and Therapeutic Activities:    [] (58414 or 00207) Provided verbal/tactile cueing for activities related to improving balance, coordination, kinesthetic sense, posture, motor skill, proprioception and motor activation to allow for proper function of core, proximal hip and LE with self care and ADLs  [] (82516) Gait Re-education- Provided training and instruction to the patient for proper LE, core and proximal hip recruitment and positioning and eccentric body weight control with ambulation re-education including up and down stairs     Home Exercise Program:    [x] (89919) Reviewed/Progressed HEP activities related to strengthening, flexibility, endurance, ROM of core, proximal hip and LE for functional self-care, mobility, lifting and ambulation/stair navigation   [] (19794)Reviewed/Progressed HEP activities related to improving balance, coordination, kinesthetic sense, posture, motor skill, proprioception of core, proximal hip and LE for self care, mobility, lifting, and ambulation/stair navigation      Access Code: [email or web address]  URL: [email or web address]  Date: 07/30/2021  Prepared by: Qasim Patel    Exercises  Clamshell with Resistance - 1 x daily - 7 x weekly - 2-3 sets - 15 reps  Prone Hip External Rotation with Resistance - 1 x daily - 7 x weekly - 2-3 sets - 10 reps  Prone Hip Internal Rotation with Resistance - 1 x daily - 7 x weekly - 2-3 sets - 10 reps  Single Leg Bridge - 1 x daily - 7 x weekly - 2-3 sets - 10 reps  Bridge with Hamstring Curl on Swiss Ball - 1 x daily - 7 x weekly - 2-3 sets - 10 reps  Standing Heel Raise with Band - 1 x daily - 7 x weekly - 2-3 sets - 10-15 reps  Single Leg Balance with Alternating Floor Reaches - 1 x daily - 7 x weekly - 3 sets - 1 reps - 30 sec hold  Single Leg Balance with Eyes Closed - 1 x daily - 7 x weekly - 3 sets - 1 reps - 30 sec hold      Manual Treatments:  PROM / STM / Oscillations-Mobs:  G-I, II, III, IV (PA's, Inf., Post.)  [] (00958) Provided manual therapy to mobilize LE, proximal hip and/or LS spine soft tissue/joints for the purpose of modulating pain, promoting relaxation,  increasing ROM, reducing/eliminating soft tissue swelling/inflammation/restriction, improving soft tissue extensibility and allowing for proper ROM for normal function with self care, mobility, lifting and ambulation. Modalities:     [] GAME READY (VASO)- for significant edema, swelling, pain control.      Charges  Timed Code Treatment Minutes: 55 min   Total Treatment Minutes: 55 min     [] EVAL (LOW) 32094   [] EVAL (MOD) 07263  [] EVAL (HIGH) 27767   [] RE-EVAL     [x] ET(96382) x 3   [] IONTO  [x] NMR (43769) x  1  [] VASO  [] Manual (41158) x      [] Other:  [] TA x      [] Mech Traction (34825)  [] ES(attended) (79329)      [] ES (un) (80564):       GOALS:   Patient stated goal: get back to dancing without pain  [x] Progressing: [] Met: [] Not Met: [] Adjusted    Therapist goals for Patient:   Short Term Goals: To be achieved in: 2 weeks  1. Independent in HEP and progression per patient tolerance, in order to prevent re-injury. [] Progressing: [x] Met: [] Not Met: [] Adjusted  2. Patient will have a decrease in pain to facilitate improvement in movement, function, and ADLs as indicated by Functional Deficits. [] Progressing: [x] Met: [] Not Met: [] Adjusted    Long Term Goals: To be achieved in: 6 weeks  1. Disability index score of 10% or less for the LEFS to assist with reaching prior level of function. [] Progressing: [] Met: [] Not Met: [] Adjusted  2. Patient will demonstrate increased AROM to WNL and symmetrical to other limb to allow for proper joint functioning as indicated by patients Functional Deficits. [] Progressing: [x] Met: [] Not Met: [] Adjusted  3. Patient will demonstrate an increase in Strength to good proximal hip strength and control, within 5lb HHD in LE to allow for proper functional mobility as indicated by patients Functional Deficits. [x] Progressing: [] Met: [] Not Met: [] Adjusted  4. Patient will be able to ascend and descend flight of stairs with normal reciprocal pattern without increased symptoms or restriction. [] Progressing: [x] Met: [] Not Met: [] Adjusted  5. 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 [x] Progressing: nearly met [] Met: [] Not Met: [] Adjusted    Progression Towards Functional goals:  [x] Patient is progressing as expected towards functional goals listed. [] Progression is slowed due to complexities listed. [] Progression has been slowed due to co-morbidities.   [] Plan just implemented, too soon to assess goals progression  [] Other:       Overall Progression Towards Functional goals/ Treatment Progress Update:  [x] Patient is progressing as expected towards functional goals listed. [] Progression is slowed due to complexities/Impairments listed. [] Progression has been slowed due to co-morbidities. [] Plan just implemented, too soon to assess goals progression <30days   [] Goals require adjustment due to lack of progress  [] Patient is not progressing as expected and requires additional follow up with physician  [] Other    Prognosis for POC: [x] Good [] Fair  [] Poor      Patient requires continued skilled intervention: [x] Yes  [] No    Treatment/Activity Tolerance:  [x] Patient able to complete treatment  [] Patient limited by fatigue  [] Patient limited by pain    [] Patient limited by other medical complications  [] Other:     ASSESSMENT: Pt has improved with normalizing ankle ROM and without pain, improved hip and ankle strength, reduced pain and tenderness, improve joint mobility particularly talar posterior glide. Pt is making good progress in her strength and is doing much better achieving her turnout from her deep hip rotators allow for less stress at the foot and ankle. Return to Play: (if applicable)   [x]  Stage 1: Intro to Strength   []  Stage 2: Return to Run and Strength   []  Stage 3: Return to Jump and Strength   []  Stage 4: Dynamic Strength and Agility   []  Stage 5: Sport Specific Training     []  Ready to Return to Play, Meets All Above Stages   []  Not Ready for Return to Sports   Comments:                           PLAN: See win. Pt to be seen 2x week for 6 weeks. May also work with [employer] for general conditioning.    [x] Continue per plan of care [] Alter current plan (see comments above)  [] Plan of care initiated [] Hold pending MD visit [] Discharge      Electronically signed by:  Giuseppe Batista PT    Note: If patient does not return for scheduled/ recommended follow up visits, this note will serve as a discharge from care along with most recent update on progress.

## 2021-08-04 ENCOUNTER — APPOINTMENT (OUTPATIENT)
Dept: PHYSICAL THERAPY | Age: 21
End: 2021-08-04

## 2021-08-06 ENCOUNTER — HOSPITAL ENCOUNTER (OUTPATIENT)
Dept: PHYSICAL THERAPY | Age: 21
Setting detail: THERAPIES SERIES
Discharge: HOME | End: 2021-08-06

## 2021-08-06 PROCEDURE — 97112 NEUROMUSCULAR REEDUCATION: CPT

## 2021-08-06 PROCEDURE — 97110 THERAPEUTIC EXERCISES: CPT

## 2021-08-06 NOTE — FLOWSHEET NOTE
[address], [address]  Phone: [phone]  Fax [phone]        Physical Therapy Treatment Note/ Progress Report:           Date:  2021    Patient Name:  Alanna Melendez    :  2000  MRN: [id number]  Restrictions/Precautions:    Medical/Treatment Diagnosis Information:  Diagnosis: M25.572 L ankle pain  Treatment Diagnosis: M25.572 L ankle pain, M62.82 L LE weakness, M25.672 L ankle stiffness  Insurance/Certification information:  PT Insurance Information: [employer]  Physician Information:  Referring Practitioner: Dr. Roi Bertrand  Has the plan of care been signed (Y/N):        []  Yes  [x]  No     Date of Patient follow up with Physician: if needed      Is this a Progress Report:     [x]  Yes  []  No        If Yes:  Date Range for reporting period:  Beginning 21  Ending     Progress report will be due (10 Rx or 30 days whichever is less):7 40    Recertification will be due (POC Duration  / 90 days whichever is less): 21     Visit # Insurance Allowable Auth Required   In-person 7 ? []  Yes []  No    Telehealth - ? []  Yes []  No    Total          Functional Scale: LEFS: 65/80 = 81% ability, 19% disability  Date assessed:  21         Number of Comorbidities:  [x]0     []1-2    []3+    Latex Allergy:  [x]NO      []YES  Preferred Language for Healthcare:   [x]English       []other:      Pain level:  0/10 at rest & ADLs, 2-3/10 with dancing    SUBJECTIVE:  Pt says dance classes have been going well. She feels she is using her hips so much more than she used to and can tell how that is helping the whole leg function better in turnout but also not overstress her knee and ankle. She also feels her ankle is so much stronger making pointe work and jumping so much better.  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     OBJECTIVE:    Observation: no point tenderness today, very mild talar posterior glide hypomobility, distal fib post glide WNL no L cuboid drop    Test measurements:    OBJECTIVE:      ROM LEFT  7/2/21 RIGHT  7/2/21 Left  7/16/21 Left  8/6/21   Ankle  deg 100 deg     Ankle DF knee straight 15 deg pain 10 deg 15 deg pain free    Ankle DF knee bent 14 deg 14 deg 20 deg pain free    Ankle In 22 deg pain 28 deg 33 deg pain free    Ankle Ev 12 deg 12 deg     Strength  LEFT RIGHT Left  7/30/21 Left 8/6/21   HIP Flexors 5/5 5/5     HIP Abductors 5- 5-     HIP Ext 5 5     Hip ER 3+ 4 L4/5, R 4+/5 L5-/5, R 4+/5   Hip IR 3+ 4 L 4/5, R 4+/5 B 5/5   Knee EXT (quad)         Knee Flex (HS)         Ankle DF 4/5 5/5 5/5    Ankle PF 4+ 5 5-/5 5/5   Ankle Inv 4 5 4+/5 5/5   Ankle EV 4+ 5- 5-/5 5/5   Toes flex 4- 5 5/5    Toes ext 4/5 5/5 5/5            RESTRICTIONS/PRECAUTIONS: none    Exercises/Interventions:     Therapeutic Ex (46616) Sets/Reps/ sec Notes/CUES   Clamshell w/ feet up    S/L hip abd w/ ext \"L\"    Bridge with abd     SL bridge    Clamshell Only 1/2 way back down   Prone hip IR & ER    Bridge with Tball curl    Foot intrinsics: toe curls, toes ext, doming    Ankle t-band 5 way with foot pointing    Seated releve with tennis ball    Side stepping     BAPS: DF/PF, inv/ev, CW/CCW WB, L3 2x10ea    Heel raise 4-way w/ tband resistance     Heel raise up 2 down 1     Heel raise 3 way (neutral, IR, ER)    Hip hikes  4\" step   S/L ll passe, V passe, developpe lower, enveloppe, V passe, ll passe, lower     Side plank clamshell Green 2x15 reps R/L   Prone passe 2x15 R/L    Heel raise on incline & on decline 2x15 reps SL    S/L ankle eversion & inversion  2 lbs   Power lunges ll & V    Power lunge clocks ll & V    Glute cross over stretch    Fig 4 stretch    G/S slant board stretch 2x30\"ea    Tippy toe walk with torso rotation Yellow med ball 2x20ft    Ref: DL lowering in turnout 1R1B 2x10    Ref: Frog 1R 1B 2x10    Pt edu: kinetic chain, use of hip for (41873) Reviewed/Progressed HEP activities related to strengthening, flexibility, endurance, ROM of core, proximal hip and LE for functional self-care, mobility, lifting and ambulation/stair navigation   [] (31682)Reviewed/Progressed HEP activities related to improving balance, coordination, kinesthetic sense, posture, motor skill, proprioception of core, proximal hip and LE for self care, mobility, lifting, and ambulation/stair navigation      Access Code: [email or web address]  URL: [email or web address]  Date: 07/30/2021  Prepared by: Odella Snellen    Exercises  Clamshell with Resistance - 1 x daily - 7 x weekly - 2-3 sets - 15 reps  Prone Hip External Rotation with Resistance - 1 x daily - 7 x weekly - 2-3 sets - 10 reps  Prone Hip Internal Rotation with Resistance - 1 x daily - 7 x weekly - 2-3 sets - 10 reps  Single Leg Bridge - 1 x daily - 7 x weekly - 2-3 sets - 10 reps  Bridge with Hamstring Curl on Swiss Ball - 1 x daily - 7 x weekly - 2-3 sets - 10 reps  Standing Heel Raise with Band - 1 x daily - 7 x weekly - 2-3 sets - 10-15 reps  Single Leg Balance with Alternating Floor Reaches - 1 x daily - 7 x weekly - 3 sets - 1 reps - 30 sec hold  Single Leg Balance with Eyes Closed - 1 x daily - 7 x weekly - 3 sets - 1 reps - 30 sec hold      Manual Treatments:  PROM / STM / Oscillations-Mobs:  G-I, II, III, IV (PA's, Inf., Post.)  [] (33671) Provided manual therapy to mobilize LE, proximal hip and/or LS spine soft tissue/joints for the purpose of modulating pain, promoting relaxation,  increasing ROM, reducing/eliminating soft tissue swelling/inflammation/restriction, improving soft tissue extensibility and allowing for proper ROM for normal function with self care, mobility, lifting and ambulation. Modalities:     [] GAME READY (VASO)- for significant edema, swelling, pain control.      Charges  Timed Code Treatment Minutes: 55 min   Total Treatment Minutes: 55 min     [] EVAL (LOW) 51592   [] EVAL (MOD) 61336  [] EVAL (HIGH) 31162   [] RE-EVAL     [x] DS(00416) x 3   [] IONTO  [x] NMR (06484) x  1  [] VASO  [] Manual (42853) x      [] Other:  [] TA x      [] Mech Traction (62232)  [] ES(attended) (60721)      [] ES (un) (78939):       GOALS:   Patient stated goal: get back to dancing without pain  [x] Progressing: [] Met: [] Not Met: [] Adjusted    Therapist goals for Patient:   Short Term Goals: To be achieved in: 2 weeks  1. Independent in HEP and progression per patient tolerance, in order to prevent re-injury. [] Progressing: [x] Met: [] Not Met: [] Adjusted  2. Patient will have a decrease in pain to facilitate improvement in movement, function, and ADLs as indicated by Functional Deficits. [] Progressing: [x] Met: [] Not Met: [] Adjusted    Long Term Goals: To be achieved in: 6 weeks  1. Disability index score of 10% or less for the LEFS to assist with reaching prior level of function. [] Progressing: [] Met: [] Not Met: [] Adjusted  2. Patient will demonstrate increased AROM to WNL and symmetrical to other limb to allow for proper joint functioning as indicated by patients Functional Deficits. [] Progressing: [x] Met: [] Not Met: [] Adjusted  3. Patient will demonstrate an increase in Strength to good proximal hip strength and control, within 5lb HHD in LE to allow for proper functional mobility as indicated by patients Functional Deficits. [x] Progressing: [] Met: [] Not Met: [] Adjusted  4. Patient will be able to ascend and descend flight of stairs with normal reciprocal pattern without increased symptoms or restriction. [] Progressing: [x] Met: [] Not Met: [] Adjusted  5. 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      [] Progressing: [x] Met: [] Not Met: [] Adjusted    Progression Towards Functional goals:  [x] Patient is progressing as expected towards functional goals listed. [] Progression is slowed due to complexities listed. [] Progression has been slowed due to co-morbidities. [] Plan just implemented, too soon to assess goals progression  [] Other:       Overall Progression Towards Functional goals/ Treatment Progress Update:  [x] Patient is progressing as expected towards functional goals listed. [] Progression is slowed due to complexities/Impairments listed. [] Progression has been slowed due to co-morbidities. [] Plan just implemented, too soon to assess goals progression <30days   [] Goals require adjustment due to lack of progress  [] Patient is not progressing as expected and requires additional follow up with physician  [] Other    Prognosis for POC: [x] Good [] Fair  [] Poor      Patient requires continued skilled intervention: [x] Yes  [] No    Treatment/Activity Tolerance:  [x] Patient able to complete treatment  [] Patient limited by fatigue  [] Patient limited by pain    [] Patient limited by other medical complications  [] Other:     ASSESSMENT: Pt has continued to improved her hip and ankle strength and is seeing the functional improvement during her dancing. She is nearing ready for D/C. Next week we will focus on organizing a long-term HEP plan for her in preparation for D/C if rehearsal week goes well. Return to Play: (if applicable)   [x]  Stage 1: Intro to Strength   []  Stage 2: Return to Run and Strength   []  Stage 3: Return to Jump and Strength   []  Stage 4: Dynamic Strength and Agility   []  Stage 5: Sport Specific Training     []  Ready to Return to Play, Meets All Above Stages   []  Not Ready for Return to Sports   Comments:                           PLAN: See win. Pt to be seen 2x week for 6 weeks. Start prepping for D/C.   [x] Continue per plan of care [] Alter current plan (see comments above)  [] Plan of care initiated [] Hold pending MD visit [] Discharge      Electronically signed by:  Neelima Robertson PT    Note: If patient does not return for scheduled/ recommended follow up visits, this note will serve as a discharge from care along with most recent update on progress.

## 2021-08-11 ENCOUNTER — HOSPITAL ENCOUNTER (OUTPATIENT)
Dept: PHYSICAL THERAPY | Age: 21
Setting detail: THERAPIES SERIES
Discharge: HOME | End: 2021-08-11

## 2021-08-11 PROCEDURE — 97140 MANUAL THERAPY 1/> REGIONS: CPT

## 2021-08-11 NOTE — FLOWSHEET NOTE
The [address] [place] [address],[facility] [address], [facility], [address]  Phone: [phone]  Fax [phone]        Physical Therapy Treatment Note/ Progress Report:           Date:  2021    Patient Name:  Sarina Gray    :  2000  MRN: [id number]  Restrictions/Precautions:    Medical/Treatment Diagnosis Information:  Diagnosis: M25.572 L ankle pain  Treatment Diagnosis: M25.572 L ankle pain, M62.82 L LE weakness, M25.672 L ankle stiffness  Insurance/Certification information:  PT Insurance Information: [place]  Physician Information:  Referring Practitioner: Dr. Robson Calix  Has the plan of care been signed (Y/N):        []  Yes  [x]  No     Date of Patient follow up with Physician: if needed      Is this a Progress Report:     [x]  Yes  []  No        If Yes:  Date Range for reporting period:  Beginning 21  Ending     Progress report will be due (10 Rx or 30 days whichever is less):7 97    Recertification will be due (POC Duration  / 90 days whichever is less): 21     Visit # Insurance Allowable Auth Required   In-person 8 ? []  Yes []  No    Telehealth - ? []  Yes []  No    Total          Functional Scale: LEFS: 65/80 = 81% ability, 19% disability  Date assessed:  21         Number of Comorbidities:  [x]0     []1-2    []3+    Latex Allergy:  [x]NO      []YES  Preferred Language for Healthcare:   [x]English       []other:      Pain level:  0/10 at rest & ADLs, 1-2/10 with dancing    SUBJECTIVE:  Pt reports she feels like she is pretty much back to normal with dancing, feels robert, more mobility, etc.  However starting yesterday for unknown reason her foot, ankle, even MTs started feeling just really achy. We discussed systemic reasons for this since there has been no change in physical objectives, shoes, activity and that diet and monthly hormone cycles can impact this too.      OBJECTIVE:    Observation: no point tenderness today, very mild talar posterior glide hypomobility, distal fib post glide WNL no L cuboid drop    Test measurements:    OBJECTIVE:      ROM LEFT  7/2/21 RIGHT  7/2/21 Left  7/16/21 Left  8/6/21   Ankle  deg 100 deg     Ankle DF knee straight 15 deg pain 10 deg 15 deg pain free    Ankle DF knee bent 14 deg 14 deg 20 deg pain free    Ankle In 22 deg pain 28 deg 33 deg pain free    Ankle Ev 12 deg 12 deg     Strength  LEFT RIGHT Left  7/30/21 Left 8/6/21   HIP Flexors 5/5 5/5     HIP Abductors 5- 5-     HIP Ext 5 5     Hip ER 3+ 4 L4/5, R 4+/5 L5-/5, R 4+/5   Hip IR 3+ 4 L 4/5, R 4+/5 B 5/5   Knee EXT (quad)         Knee Flex (HS)         Ankle DF 4/5 5/5 5/5    Ankle PF 4+ 5 5-/5 5/5   Ankle Inv 4 5 4+/5 5/5   Ankle EV 4+ 5- 5-/5 5/5   Toes flex 4- 5 5/5    Toes ext 4/5 5/5 5/5            RESTRICTIONS/PRECAUTIONS: none    Exercises/Interventions:     Therapeutic Ex (76378) Sets/Reps/ sec Notes/CUES   Clamshell w/ feet up    S/L hip abd w/ ext \"L\"    Bridge with abd     SL bridge    Clamshell Only 1/2 way back down   Prone hip IR & ER    Bridge with Tball curl    Foot intrinsics: toe curls, toes ext, doming    Ankle t-band 5 way with foot pointing    Seated releve with tennis ball    Side stepping     BAPS: DF/PF, inv/ev, CW/CCW     Heel raise 4-way w/ tband resistance     Heel raise up 2 down 1     Heel raise 3 way (neutral, IR, ER)    Hip hikes  4\" step   S/L ll passe, V passe, developpe lower, enveloppe, V passe, ll passe, lower     Side plank clamshell R/L   Prone passe    Heel raise on incline & on decline    S/L ankle eversion & inversion 2 lbs   Power lunges ll & V    Power lunge clocks ll & V    Glute cross over stretch    Fig 4 stretch    G/S slant board stretch    Tippy toe walk with torso rotation    Ref: DL lowering in turnout    Ref: Frog    Pt edu: use of HEP long-term, hormonal cycles affecting inflammatory hormone cycels as well. 4 min    Manual Intervention (07929) 40 min total Taping: elastikon 2 lateral stirrups and 1 heel lock and teardrop     Talar post mob, Gd III, w/ AAROM DF 5 min    Distal fib posterior glide with PROM DF 1 min    Cuboid dorsal glide gd 2-3        DF AAROM with static manual stretch     STM/ MFR: ant tib, gastroc, soleus, post tib, plantar foot, R piriformis and glutes 35 min    NMR re-education (68838)  CUES NEEDED   SLB 3-way kick    SLB turn head    SLB airex    Footprints: ll <> V Cued for inc deep rot and dec glute , inc anterior core to control pelvic tilt   Footprints: L ll R V; R ll L V    Footprints: ll plie turnout, turn in, up    Footprints: ll plie V up, V plie ll up    SLB cup pick-ups     Steamboats on airex    SLB BOSU w/ ballet arms         Therapeutic Activity (46415)                                         Therapeutic Exercise and NMR EXR  [] (38001) Provided verbal/tactile cueing for activities related to strengthening, flexibility, endurance, ROM for improvements in LE, proximal hip, and core control with self care, mobility, lifting, ambulation.  [] (03691) Provided verbal/tactile cueing for activities related to improving balance, coordination, kinesthetic sense, posture, motor skill, proprioception  to assist with LE, proximal hip, and core control in self care, mobility, lifting, ambulation and eccentric single leg control.       NMR and Therapeutic Activities:    [] (18210 or 55738) Provided verbal/tactile cueing for activities related to improving balance, coordination, kinesthetic sense, posture, motor skill, proprioception and motor activation to allow for proper function of core, proximal hip and LE with self care and ADLs  [] (38217) Gait Re-education- Provided training and instruction to the patient for proper LE, core and proximal hip recruitment and positioning and eccentric body weight control with ambulation re-education including up and down stairs     Home Exercise Program:    [x] (81511) Reviewed/Progressed HEP activities related to strengthening, flexibility, endurance, ROM of core, proximal hip and LE for functional self-care, mobility, lifting and ambulation/stair navigation   [] (24828)Reviewed/Progressed HEP activities related to improving balance, coordination, kinesthetic sense, posture, motor skill, proprioception of core, proximal hip and LE for self care, mobility, lifting, and ambulation/stair navigation      Access Code: [email or web address]  URL: [email or web address]  Date: 07/30/2021  Prepared by: Silva Folds    Exercises  Clamshell with Resistance - 1 x daily - 7 x weekly - 2-3 sets - 15 reps  Prone Hip External Rotation with Resistance - 1 x daily - 7 x weekly - 2-3 sets - 10 reps  Prone Hip Internal Rotation with Resistance - 1 x daily - 7 x weekly - 2-3 sets - 10 reps  Single Leg Bridge - 1 x daily - 7 x weekly - 2-3 sets - 10 reps  Bridge with Hamstring Curl on Swiss Ball - 1 x daily - 7 x weekly - 2-3 sets - 10 reps  Standing Heel Raise with Band - 1 x daily - 7 x weekly - 2-3 sets - 10-15 reps  Single Leg Balance with Alternating Floor Reaches - 1 x daily - 7 x weekly - 3 sets - 1 reps - 30 sec hold  Single Leg Balance with Eyes Closed - 1 x daily - 7 x weekly - 3 sets - 1 reps - 30 sec hold      Manual Treatments:  PROM / STM / Oscillations-Mobs:  G-I, II, III, IV (PA's, Inf., Post.)  [x] (99153) Provided manual therapy to mobilize LE, proximal hip and/or LS spine soft tissue/joints for the purpose of modulating pain, promoting relaxation,  increasing ROM, reducing/eliminating soft tissue swelling/inflammation/restriction, improving soft tissue extensibility and allowing for proper ROM for normal function with self care, mobility, lifting and ambulation. Modalities:     [] GAME READY (VASO)- for significant edema, swelling, pain control.      Charges  Timed Code Treatment Minutes: 45 min   Total Treatment Minutes: 45 min     [] EVAL (LOW) 67505   [] EVAL (MOD) 60425  [] EVAL (HIGH) 73212   [] RE-EVAL     [] DX(15494) x    [] IONTO  [] NMR (12236) x    [] VASO  [x] Manual (94909) x 3     [] Other:  [] TA x      [] Mech Traction (60346)  [] ES(attended) (72812)      [] ES (un) (38768):       GOALS:   Patient stated goal: get back to dancing without pain  [x] Progressing: [] Met: [] Not Met: [] Adjusted    Therapist goals for Patient:   Short Term Goals: To be achieved in: 2 weeks  1. Independent in HEP and progression per patient tolerance, in order to prevent re-injury. [] Progressing: [x] Met: [] Not Met: [] Adjusted  2. Patient will have a decrease in pain to facilitate improvement in movement, function, and ADLs as indicated by Functional Deficits. [] Progressing: [x] Met: [] Not Met: [] Adjusted    Long Term Goals: To be achieved in: 6 weeks  1. Disability index score of 10% or less for the LEFS to assist with reaching prior level of function. [] Progressing: [] Met: [] Not Met: [] Adjusted  2. Patient will demonstrate increased AROM to WNL and symmetrical to other limb to allow for proper joint functioning as indicated by patients Functional Deficits. [] Progressing: [x] Met: [] Not Met: [] Adjusted  3. Patient will demonstrate an increase in Strength to good proximal hip strength and control, within 5lb HHD in LE to allow for proper functional mobility as indicated by patients Functional Deficits. [x] Progressing: [] Met: [] Not Met: [] Adjusted  4. Patient will be able to ascend and descend flight of stairs with normal reciprocal pattern without increased symptoms or restriction. [] Progressing: [x] Met: [] Not Met: [] Adjusted  5. 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      [] Progressing: [x] Met: [] Not Met: [] Adjusted    Progression Towards Functional goals:  [x] Patient is progressing as expected towards functional goals listed. [] Progression is slowed due to complexities listed. [] Progression has been slowed due to co-morbidities. [] Plan just implemented, too soon to assess goals progression  [] Other:       Overall Progression Towards Functional goals/ Treatment Progress Update:  [x] Patient is progressing as expected towards functional goals listed. [] Progression is slowed due to complexities/Impairments listed. [] Progression has been slowed due to co-morbidities. [] Plan just implemented, too soon to assess goals progression <30days   [] Goals require adjustment due to lack of progress  [] Patient is not progressing as expected and requires additional follow up with physician  [] Other    Prognosis for POC: [x] Good [] Fair  [] Poor      Patient requires continued skilled intervention: [x] Yes  [] No    Treatment/Activity Tolerance:  [x] Patient able to complete treatment  [] Patient limited by fatigue  [] Patient limited by pain    [] Patient limited by other medical complications  [] Other:     ASSESSMENT: Pt continues to have improved ROM, improved arthrokinematics, improved strength therefore focus on soft tissue recovery today. After manual work pt reports the leg felt so much better in walking, plie and releve and felt \"back to normal.\"    Return to Play: (if applicable)   [x]  Stage 1: Intro to Strength   []  Stage 2: Return to Run and Strength   []  Stage 3: Return to Jump and Strength   []  Stage 4: Dynamic Strength and Agility   []  Stage 5: Sport Specific Training     []  Ready to Return to Play, Meets All Above Stages   []  Not Ready for Return to Sports   Comments:                           PLAN: See eval. Pt to be seen 2x week for 6 weeks. Likely D/C.   [x] Continue per plan of care [] Alter current plan (see comments above)  [] Plan of care initiated [] Hold pending MD visit [] Discharge      Electronically signed by:  Shereen Eldridge PT    Note: If patient does not return for scheduled/ recommended follow up visits, this note will serve as a discharge from care along with most recent update on progress.

## 2021-08-13 ENCOUNTER — HOSPITAL ENCOUNTER (OUTPATIENT)
Dept: PHYSICAL THERAPY | Age: 21
Setting detail: THERAPIES SERIES
Discharge: HOME | End: 2021-08-13

## 2021-08-13 PROCEDURE — 97110 THERAPEUTIC EXERCISES: CPT

## 2021-08-13 NOTE — DISCHARGE SUMMARY
The [address] [place] [address],[address], [address]  Phone: [phone]  Fax [phone]      DISCHARGE NOTE  Physical Therapy Treatment Note/ Progress Report:           Date:  2021    Patient Name:  Brent Bernal    :  2000  MRN: [id number]  Restrictions/Precautions:    Medical/Treatment Diagnosis Information:  Diagnosis: M25.572 L ankle pain  Treatment Diagnosis: M25.572 L ankle pain, M62.82 L LE weakness, M25.672 L ankle stiffness  Insurance/Certification information:  PT Insurance Information: [place]  Physician Information:  Referring Practitioner: Dr. Haley Mancilla  Has the plan of care been signed (Y/N):        []  Yes  [x]  No     Date of Patient follow up with Physician: if needed    Is this a Progress Report:     [x]  Yes  []  No        If Yes:  Date Range for reporting period:  Beginning 21  Ending 21    Progress report will be due (10 Rx or 30 days whichever is less):89    Recertification will be due (POC Duration  / 90 days whichever is less): 21     Visit # Insurance Allowable Auth Required   In-person 9 ? []  Yes []  No    Telehealth - ? []  Yes []  No    Total          Functional Scale: LEFS: 65/80 = 81% ability, 19% disability  Date assessed:  21         LEFS: 80/80 = 100% ability, 0% disability  Date assessed: 21       Number of Comorbidities:  [x]0     []1-2    []3+    Latex Allergy:  [x]NO      []YES  Preferred Language for Healthcare:   [x]English       []other:      Pain level:  0/10 at rest & ADLs, 2/10 feels like soreness more than \"the pain\" that made her seek care    SUBJECTIVE:  Pt reports she feels 100% better with no pain or restriction during ADLs and 100% better with dancing without any restriction or limitation but does get sore, however with her level of dancing that is to be expected. She is very happy with her outcome.      OBJECTIVE:    Observation: no point tenderness today, very mild talar posterior glide hypomobility, distal fib post glide WNL no L cuboid drop   Test measurements:  Pt able to do 15 SL releve with good heel height and good ankle alignment control, able to do parallel and turned out plies with improved control over dynamic knee valgus and foot pronation now that using hip rotators better.      OBJECTIVE:      ROM LEFT  7/2/21 RIGHT  7/2/21 Left  7/16/21 Left  8/6/21 Left 8/13/21   Ankle  deg 100 deg   WNL   Ankle DF knee straight 15 deg pain 10 deg 15 deg pain free  WNL   Ankle DF knee bent 14 deg 14 deg 20 deg pain free  WNL   Ankle In 22 deg pain 28 deg 33 deg pain free  WNL   Ankle Ev 12 deg 12 deg   WNL   Strength  LEFT RIGHT Left  7/30/21 Left 8/6/21 Left 8/13/21   HIP Flexors 5/5 5/5      HIP Abductors 5- 5-   B 5/5   HIP Ext 5 5      Hip ER 3+ 4 L4/5, R 4+/5 L5-/5, R 4+/5 L 5-/5, R 5/5   Hip IR 3+ 4 L 4/5, R 4+/5 B 5/5 B 5/5   Knee EXT (quad)          Knee Flex (HS)          Ankle DF 4/5 5/5 5/5     Ankle PF 4+ 5 5-/5 5/5 5/5   Ankle Inv 4 5 4+/5 5/5 5/5   Ankle EV 4+ 5- 5-/5 5/5 5/5   Toes flex 4- 5 5/5  5/5   Toes ext 4/5 5/5 5/5  5/5       RESTRICTIONS/PRECAUTIONS: none    Exercises/Interventions:     Therapeutic Ex (49456) Sets/Reps/ sec Notes/CUES   Clamshell w/ feet up    S/L hip abd w/ ext \"L\"    Bridge with abd     SL bridge    Clamshell Only 1/2 way back down   Clamshell Combo x15   Prone hip IR & ER    Bridge with Tball curl    Foot intrinsics: toe curls, toes ext, doming    Ankle t-band 5 way with foot pointing    Seated releve with tennis ball    Side stepping     BAPS: DF/PF, inv/ev, CW/CCW     Heel raise 4-way w/ tband resistance     Heel raise up 2 down 1     Heel raise 3 way (neutral, IR, ER)    Hip hikes  4\" step   S/L ll passe, V passe, developpe lower, enveloppe, V passe, ll passe, lower     Side plank clamshell R/L   Prone passe    Heel raise on incline & on decline 2x15 reps SL   S/L ankle eversion & inversion x20 with self care and ADLs  [] (59336) Gait Re-education- Provided training and instruction to the patient for proper LE, core and proximal hip recruitment and positioning and eccentric body weight control with ambulation re-education including up and down stairs     Home Exercise Program:    [x] (34703) Reviewed/Progressed HEP activities related to strengthening, flexibility, endurance, ROM of core, proximal hip and LE for functional self-care, mobility, lifting and ambulation/stair navigation   [] (83933)Reviewed/Progressed HEP activities related to improving balance, coordination, kinesthetic sense, posture, motor skill, proprioception of core, proximal hip and LE for self care, mobility, lifting, and ambulation/stair navigation      Access Code: [email or web address]  URL: [email or web address]  Date: 07/30/2021  Prepared by: Radha Goldstein    Exercises  Clamshell with Resistance - 1 x daily - 7 x weekly - 2-3 sets - 15 reps  Prone Hip External Rotation with Resistance - 1 x daily - 7 x weekly - 2-3 sets - 10 reps  Prone Hip Internal Rotation with Resistance - 1 x daily - 7 x weekly - 2-3 sets - 10 reps  Single Leg Bridge - 1 x daily - 7 x weekly - 2-3 sets - 10 reps  Bridge with Hamstring Curl on Swiss Ball - 1 x daily - 7 x weekly - 2-3 sets - 10 reps  Standing Heel Raise with Band - 1 x daily - 7 x weekly - 2-3 sets - 10-15 reps  Single Leg Balance with Alternating Floor Reaches - 1 x daily - 7 x weekly - 3 sets - 1 reps - 30 sec hold  Single Leg Balance with Eyes Closed - 1 x daily - 7 x weekly - 3 sets - 1 reps - 30 sec hold      Manual Treatments:  PROM / STM / Oscillations-Mobs:  G-I, II, III, IV (PA's, Inf., Post.)  [] (04972) Provided manual therapy to mobilize LE, proximal hip and/or LS spine soft tissue/joints for the purpose of modulating pain, promoting relaxation,  increasing ROM, reducing/eliminating soft tissue swelling/inflammation/restriction, improving soft tissue extensibility and allowing for proper ROM for normal function with self care, mobility, lifting and ambulation. Modalities:     [] GAME READY (VASO)- for significant edema, swelling, pain control. Charges  Timed Code Treatment Minutes: 40 min   Total Treatment Minutes: 40 min     [] EVAL (LOW) 09254   [] EVAL (MOD) 88802  [] EVAL (HIGH) 32839   [] RE-EVAL     [x] FM(26802) x  3  [] IONTO  [] NMR (73473) x    [] VASO  [] Manual (11319) x      [] Other:  [] TA x      [] Mech Traction (20108)  [] ES(attended) (58342)      [] ES (un) (57949):       GOALS:   Patient stated goal: get back to dancing without pain  [] Progressing: [x] Met: [] Not Met: [] Adjusted    Therapist goals for Patient:   Short Term Goals: To be achieved in: 2 weeks  1. Independent in HEP and progression per patient tolerance, in order to prevent re-injury. [] Progressing: [x] Met: [] Not Met: [] Adjusted  2. Patient will have a decrease in pain to facilitate improvement in movement, function, and ADLs as indicated by Functional Deficits. [] Progressing: [x] Met: [] Not Met: [] Adjusted    Long Term Goals: To be achieved in: 6 weeks  1. Disability index score of 10% or less for the LEFS to assist with reaching prior level of function. [] Progressing: [x] Met: [] Not Met: [] Adjusted  2. Patient will demonstrate increased AROM to WNL and symmetrical to other limb to allow for proper joint functioning as indicated by patients Functional Deficits. [] Progressing: [x] Met: [] Not Met: [] Adjusted  3. Patient will demonstrate an increase in Strength to good proximal hip strength and control, within 5lb HHD in LE to allow for proper functional mobility as indicated by patients Functional Deficits. [] Progressing: [x] Met: [] Not Met: [] Adjusted  4. Patient will be able to ascend and descend flight of stairs with normal reciprocal pattern without increased symptoms or restriction. [] Progressing: [x] Met: [] Not Met: [] Adjusted  5.  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 [] Progressing: [x] Met: [] Not Met: [] Adjusted    Progression Towards Functional goals:  [x] Patient is progressing as expected towards functional goals listed. [] Progression is slowed due to complexities listed. [] Progression has been slowed due to co-morbidities. [] Plan just implemented, too soon to assess goals progression  [] Other:       Overall Progression Towards Functional goals/ Treatment Progress Update:  [x] Patient is progressing as expected towards functional goals listed. [] Progression is slowed due to complexities/Impairments listed. [] Progression has been slowed due to co-morbidities. [] Plan just implemented, too soon to assess goals progression <30days   [] Goals require adjustment due to lack of progress  [] Patient is not progressing as expected and requires additional follow up with physician  [] Other    Prognosis for POC: [x] Good [] Fair  [] Poor      Patient requires continued skilled intervention: [x] Yes  [] No    Treatment/Activity Tolerance:  [x] Patient able to complete treatment  [] Patient limited by fatigue  [] Patient limited by pain    [] Patient limited by other medical complications  [] Other:     ASSESSMENT: Pt has made excellent progress in PT. She now has full ankle ROM that is now pain free, much improved ankle, foot, and hip strength. Better awareness and usage of hip support and deep rotators muscles to support her turnout while dancing to reduce excess stress tot he foot and ankle. Pt has returned to dance 100% without restriction or complaint. PT is happy with her recovery and feels ready for D/C with HEP.       Return to Play: (if applicable)   []  Stage 1: Intro to Strength   []  Stage 2: Return to Run and Strength   []  Stage 3: Return to Jump and Strength   []  Stage 4: Dynamic Strength and Agility   []  Stage 5: Sport Specific Training     [x]  Ready to Return to Play, Meets All Above Stages   []  Not Ready for Return to Sports   Comments:                           PLAN: D/C with HEP maintenance plan. [] Continue per plan of care [] Alter current plan (see comments above)  [] Plan of care initiated [] Hold pending MD visit [x] Discharge      Electronically signed by:  Shereen Eldridge, PT, DPT, [place], [place] [id number]    Note: If patient does not return for scheduled/ recommended follow up visits, this note will serve as a discharge from care along with most recent update on progress.

## 2021-08-30 ENCOUNTER — TELEPHONE (OUTPATIENT)
Dept: FAMILY MEDICINE CLINIC | Age: 21
End: 2021-08-30

## 2021-09-17 NOTE — PROGRESS NOTES
Assessment/Plan:    Darling Montero was seen today for new patient, depression, anxiety and rib injury. Diagnoses and all orders for this visit:    Anxiety and depression, chronic insomnia, disordered eating  -     escitalopram (LEXAPRO) 10 MG tablet; 1/2 po qd x 6 days, then 1 po daily   [facility] rec'd. Pt may eventually benefit from sports therapist or eating d/o specialist.    Needs flu shot  -     INFLUENZA, MDCK QUADV, 2 YRS AND OLDER, IM, PF, PREFILL SYR OR SDV, 0.5ML (FLUCELVAX QUADV, PF)    Contusion of rib on right side, initial encounter   Assurance given. Pt has 3-4 wk break from intense rehearsing. Anticipate gradualy resolution. Vasovagal syncope   2/2 dehydration, insufficient food intake. Pt is now feeling better. Balanced intake encouraged. Pt is interested in birth control. To further discuss at f/u of lexapro. Pt may be excellent IUD candidate. Mom is to bring pt's shot record at upcoming visit. 45 min visit. Patient Instructions   Please make an appointment with one of our [facility]ants, Dr. Pamela Lambert or Mark Cabrera. They will work with you to develop a plan to improve your overall well-being by addressing any behavioral or mental health factors that are influencing your health. You can schedule your appointment just like you schedule your other appointments here, at the  or over the phone. Each appointment will be 30 minutes long, and you will typically be seen every 2-4 weeks. Your insurance should cover the visit, but you may owe a specialist copay. If you would prefer to be seen by a therapist more frequently, or for a longer visit, please ask your Primary Care Provider for a recommendation. Please ask your mom to bring a copy of your shot record when she comes for a visit. Patient: Kel Maynard is a 22 y. o.female who presents today with the following Chief Complaint(s):  Chief Complaint   Patient presents with    New Patient    Depression day.    Last pm, was on friend's patio. Was leaning against ledge, talking. Vision became blurry, sounds were as if she were under water. Awoke on ground, friends told her she passed out. Pt drank water, went to sleep. This am felt better, did not eat but went to dance class. Felt weak in class, since had macrobar. Sleep is good on days she feels exhausted but has some days where she has prim insom. May drift off on/off through the noc, moreso when thinking about issues    Is sexually active. Was on ocp 12/2020 from [place] , gained wt and therefore stopped med. Has irreg menses in recent mo's. Menses last 5 days, has bad cramps. Uses condoms. Is interested in birth control. Had R mid back pain related to a lift, worse with recent heavy rehearsal and performance schedule. No sob, cough, cp. Had covid 12/2020, fully recovered. Pgrandparents passed 80 and 96. MGM with AD. Had HPV vaccines 19 and 19. Had Tdap 7th grade. Pt's mom is coming to visit and is asked to bring vaccine card/shot record. Current Outpatient Medications   Medication Sig Dispense Refill    escitalopram (LEXAPRO) 10 MG tablet 1/2 po qd x 6 days, then 1 po daily 30 tablet 3     No current facility-administered medications for this visit. Patient's past medical history,surgical history, family history, medications,  and allergies  were all reviewed and updated as appropriate today. Review of Systems  abv    Physical Exam  Constitutional:       Appearance: Normal appearance. She is well-developed. HENT:      Head: Normocephalic and atraumatic. Right Ear: External ear normal.      Left Ear: External ear normal.   Eyes:      General: No scleral icterus. Right eye: No discharge. Left eye: No discharge. Extraocular Movements: Extraocular movements intact.       Conjunctiva/sclera: Conjunctivae normal.   Neck:      Comments: No visualized masses  FROM  Cardiovascular:      Rate and Rhythm: Normal rate and regular rhythm. Comments: R costal margin NT  Post R ribs NT  Pulmonary:      Effort: Pulmonary effort is normal.      Breath sounds: Normal breath sounds. Musculoskeletal:         General: Normal range of motion. Skin:     General: Skin is warm and dry. Neurological:      General: No focal deficit present. Mental Status: She is alert and oriented to person, place, and time. Psychiatric:         Mood and Affect: Mood normal.         Behavior: Behavior normal.         Thought Content:  Thought content normal.         Judgment: Judgment normal.           BP (!) 100/54   Pulse 112   Temp 97.3 °F (36.3 °C) (Temporal)   Ht 5' 3\" (1.6 m)   Wt 122 lb (55.3 kg)   SpO2 94%   BMI 21.61 kg/m²

## 2021-09-20 ENCOUNTER — OFFICE VISIT (OUTPATIENT)
Dept: FAMILY MEDICINE CLINIC | Age: 21
End: 2021-09-20
Payer: COMMERCIAL

## 2021-09-20 VITALS
OXYGEN SATURATION: 94 % | DIASTOLIC BLOOD PRESSURE: 54 MMHG | HEART RATE: 112 BPM | TEMPERATURE: 97.3 F | BODY MASS INDEX: 21.62 KG/M2 | SYSTOLIC BLOOD PRESSURE: 100 MMHG | HEIGHT: 63 IN | WEIGHT: 122 LBS

## 2021-09-20 DIAGNOSIS — Z23 NEEDS FLU SHOT: ICD-10-CM

## 2021-09-20 DIAGNOSIS — F41.9 ANXIETY AND DEPRESSION: Primary | ICD-10-CM

## 2021-09-20 DIAGNOSIS — R55 VASOVAGAL SYNCOPE: ICD-10-CM

## 2021-09-20 DIAGNOSIS — F51.04 CHRONIC INSOMNIA: ICD-10-CM

## 2021-09-20 DIAGNOSIS — S20.211A CONTUSION OF RIB ON RIGHT SIDE, INITIAL ENCOUNTER: ICD-10-CM

## 2021-09-20 DIAGNOSIS — F32.A ANXIETY AND DEPRESSION: Primary | ICD-10-CM

## 2021-09-20 DIAGNOSIS — Z13.31 POSITIVE DEPRESSION SCREENING: ICD-10-CM

## 2021-09-20 PROCEDURE — 99204 OFFICE O/P NEW MOD 45 MIN: CPT | Performed by: FAMILY MEDICINE

## 2021-09-20 PROCEDURE — 90471 IMMUNIZATION ADMIN: CPT | Performed by: FAMILY MEDICINE

## 2021-09-20 PROCEDURE — 90674 CCIIV4 VAC NO PRSV 0.5 ML IM: CPT | Performed by: FAMILY MEDICINE

## 2021-09-20 PROCEDURE — G8431 POS CLIN DEPRES SCRN F/U DOC: HCPCS | Performed by: FAMILY MEDICINE

## 2021-09-20 RX ORDER — ESCITALOPRAM OXALATE 10 MG/1
TABLET ORAL
Qty: 30 TABLET | Refills: 3 | Status: SHIPPED | OUTPATIENT
Start: 2021-09-20 | End: 2022-01-24 | Stop reason: ALTCHOICE

## 2021-09-20 ASSESSMENT — PATIENT HEALTH QUESTIONNAIRE - PHQ9
SUM OF ALL RESPONSES TO PHQ9 QUESTIONS 1 & 2: 4
SUM OF ALL RESPONSES TO PHQ QUESTIONS 1-9: 19
3. TROUBLE FALLING OR STAYING ASLEEP: 2
8. MOVING OR SPEAKING SO SLOWLY THAT OTHER PEOPLE COULD HAVE NOTICED. OR THE OPPOSITE, BEING SO FIGETY OR RESTLESS THAT YOU HAVE BEEN MOVING AROUND A LOT MORE THAN USUAL: 0
7. TROUBLE CONCENTRATING ON THINGS, SUCH AS READING THE NEWSPAPER OR WATCHING TELEVISION: 2
5. POOR APPETITE OR OVEREATING: 3
6. FEELING BAD ABOUT YOURSELF - OR THAT YOU ARE A FAILURE OR HAVE LET YOURSELF OR YOUR FAMILY DOWN: 3
SUM OF ALL RESPONSES TO PHQ QUESTIONS 1-9: 19
SUM OF ALL RESPONSES TO PHQ QUESTIONS 1-9: 17
4. FEELING TIRED OR HAVING LITTLE ENERGY: 3
2. FEELING DOWN, DEPRESSED OR HOPELESS: 3
9. THOUGHTS THAT YOU WOULD BE BETTER OFF DEAD, OR OF HURTING YOURSELF: 2
1. LITTLE INTEREST OR PLEASURE IN DOING THINGS: 1
10. IF YOU CHECKED OFF ANY PROBLEMS, HOW DIFFICULT HAVE THESE PROBLEMS MADE IT FOR YOU TO DO YOUR WORK, TAKE CARE OF THINGS AT HOME, OR GET ALONG WITH OTHER PEOPLE: 2

## 2021-09-20 NOTE — PATIENT INSTRUCTIONS
Please make an appointment with one of our [address], Dr. Nhi Freed or Evelia Dancer. They will work with you to develop a plan to improve your overall well-being by addressing any behavioral or mental health factors that are influencing your health. You can schedule your appointment just like you schedule your other appointments here, at the  or over the phone. Each appointment will be 30 minutes long, and you will typically be seen every 2-4 weeks. Your insurance should cover the visit, but you may owe a specialist copay. If you would prefer to be seen by a therapist more frequently, or for a longer visit, please ask your Primary Care Provider for a recommendation. Please ask your mom to bring a copy of your shot record when she comes for a visit.

## 2021-09-28 ENCOUNTER — OFFICE VISIT (OUTPATIENT)
Dept: PSYCHOLOGY | Age: 21
End: 2021-09-28
Payer: COMMERCIAL

## 2021-09-28 DIAGNOSIS — F50.9 EATING DISORDER, UNSPECIFIED TYPE: ICD-10-CM

## 2021-09-28 DIAGNOSIS — F32.2 CURRENT SEVERE EPISODE OF MAJOR DEPRESSIVE DISORDER WITHOUT PSYCHOTIC FEATURES, UNSPECIFIED WHETHER RECURRENT (HCC): ICD-10-CM

## 2021-09-28 DIAGNOSIS — F41.9 ANXIETY: Primary | ICD-10-CM

## 2021-09-28 PROCEDURE — 90791 PSYCH DIAGNOSTIC EVALUATION: CPT | Performed by: PSYCHOLOGIST

## 2021-09-28 ASSESSMENT — ANXIETY QUESTIONNAIRES
4. TROUBLE RELAXING: 3
GAD7 TOTAL SCORE: 17
1. FEELING NERVOUS, ANXIOUS, OR ON EDGE: 3
2. NOT BEING ABLE TO STOP OR CONTROL WORRYING: 3
3. WORRYING TOO MUCH ABOUT DIFFERENT THINGS: 3
7. FEELING AFRAID AS IF SOMETHING AWFUL MIGHT HAPPEN: 1
6. BECOMING EASILY ANNOYED OR IRRITABLE: 2
5. BEING SO RESTLESS THAT IT IS HARD TO SIT STILL: 2

## 2021-09-28 ASSESSMENT — PATIENT HEALTH QUESTIONNAIRE - PHQ9
2. FEELING DOWN, DEPRESSED OR HOPELESS: 3
SUM OF ALL RESPONSES TO PHQ QUESTIONS 1-9: 20
9. THOUGHTS THAT YOU WOULD BE BETTER OFF DEAD, OR OF HURTING YOURSELF: 2
4. FEELING TIRED OR HAVING LITTLE ENERGY: 3
SUM OF ALL RESPONSES TO PHQ QUESTIONS 1-9: 22
1. LITTLE INTEREST OR PLEASURE IN DOING THINGS: 2
5. POOR APPETITE OR OVEREATING: 3
8. MOVING OR SPEAKING SO SLOWLY THAT OTHER PEOPLE COULD HAVE NOTICED. OR THE OPPOSITE, BEING SO FIGETY OR RESTLESS THAT YOU HAVE BEEN MOVING AROUND A LOT MORE THAN USUAL: 1
6. FEELING BAD ABOUT YOURSELF - OR THAT YOU ARE A FAILURE OR HAVE LET YOURSELF OR YOUR FAMILY DOWN: 3
SUM OF ALL RESPONSES TO PHQ QUESTIONS 1-9: 22
7. TROUBLE CONCENTRATING ON THINGS, SUCH AS READING THE NEWSPAPER OR WATCHING TELEVISION: 2
3. TROUBLE FALLING OR STAYING ASLEEP: 3
SUM OF ALL RESPONSES TO PHQ9 QUESTIONS 1 & 2: 5

## 2021-09-28 NOTE — PROGRESS NOTES
Behavioral Health Consultation  LIVAN Kirk. Psychology Assistant  Lexie Kuhn, Ph.D. Supervising Psychologist  9/28/2021  2:09 PM EDT      Time spent with Patient: 50 minutes  This is patient's first [facility] appointment. Reason for Consult:    Chief Complaint   Patient presents with    Anxiety    Eating Disorder    Depression     Referring Provider: Michelle Alicia MD  [address] [facility],  [facility]    Pt provided informed consent for the behavioral health program. Discussed with patient model of service to include the limits of confidentiality (i.e. abuse reporting, suicide intervention, etc.) and short-term intervention focused approach. Reviewed provision of services under supervision of licensed psychologist and obtained written consent. Pt indicated understanding. Feedback given to PCP. S:  Patient presented with concerns about depression, and disordered eating. Patient reported depressive symptoms, including depressed mood, deactivation, anhedonia, poor self-worth, social isolation, lack of appetite, insomnia/hypersomnia, fatigue and poor concentration/focus. Sx have been present for about a year. Pt was initially managing symptoms with 25 mg Zoloft but discontinued medication due to feeling numb. Pt noted an increase in depressive symptoms after discontinuing Zoloft. Pt recently started using Lexapro to manage depressive symptom and denied current side effects. Patient reported that she has been restricting calories to lose weight for about 5 months. Pt expressed that she has struggled with her body image since adolescence. Pt is a ballet dancer, and reported that her mother and old dance teachers had encouraged her in the past to lose weight. Pt reported that she took a break from dancing last year due to [place] and that she gained some weight during her time off. Pt also reported that she is having trouble booking dances which also motivates her desire to lose weight.  Of note, Pt reported a fainting episode in the past week. Goals for treatment incude improving self esteem, reducing disordered eating behavior, and losing weight in a healthy way. [facility] discussed strategies to increase food intake with client. Client created a brief meal plan. [facility] also emphasized importance of self-care specifically in the areas of appetite, sleep and behavioral activation. Pt reported feeling nervous about gaining weight. Pt also reported a fear of \"feeling sluggish\" and \"not being able to be lifted\". Pt also expressed insights on how increasing food intake would be beneficial to her as a dancer, and expressed that she was ready to make improvements since restriction was not sustainable or good for her health. Patient lives with two roommates. Patient works full time as a dancer. Daily routine is variable. Daily caffeine use is none. Pt denies cigarette use. Pt reported some alcohol use and having about 3-4 drinks during the weekends. Pt also reported some marijuana use. Pt reported some regular exercise. Social support is adequate. Hoahaoism/Spirituality is none. Patient described variable sleep pattern and reported that she either oversleeps, or doesn't get enough sleep. Family history is positive for Depression (siblings), Anxiety (siblings), and Substance Use (siblings and uncles).      O:  MSE:    Appearance: good hygiene   Attitude: cooperative and friendly  Consciousness: alert  Orientation: oriented to person, place, time, general circumstance  Memory: recent and remote memory intact  Attention/Concentration: intact during session  Psychomotor Activity:normal  Eye Contact: normal  Speech: normal rate and volume, well-articulated  Mood: stable  Affect: euthymic  Perception: within normal limits  Thought Content: all-or-none thinking and intrusive thoughts  Thought Process: logical, coherent and goal-directed  Insight: good  Judgment: intact  Ability to understand instructions: Yes  Ability to respond meaningfully: Yes  Morbid Ideation: passive thoughts of death  Suicide Assessment: suicidal ideation without plan or intent  Homicidal Ideation: no    History:    Medications:   Current Outpatient Medications   Medication Sig Dispense Refill    escitalopram (LEXAPRO) 10 MG tablet 1/2 po qd x 6 days, then 1 po daily 30 tablet 3     No current facility-administered medications for this visit. Social History:   Social History     Socioeconomic History    Marital status: Single     Spouse name: Not on file    Number of children: Not on file    Years of education: Not on file    Highest education level: Not on file   Occupational History    Not on file   Tobacco Use    Smoking status: Never Smoker    Smokeless tobacco: Never Used   Substance and Sexual Activity    Alcohol use: Yes     Comment: Rarely    Drug use: Yes     Types: Marijuana     Comment: few times per wk for anx and insomnia    Sexual activity: Not on file   Other Topics Concern    Not on file   Social History Narrative    Not on file     Social Determinants of Health     Financial Resource Strain:     Difficulty of Paying Living Expenses:    Food Insecurity:     Worried About Running Out of Food in the Last Year:     Ran Out of Food in the Last Year:    Transportation Needs:     Lack of Transportation (Medical):      Lack of Transportation (Non-Medical):    Physical Activity:     Days of Exercise per Week:     Minutes of Exercise per Session:    Stress:     Feeling of Stress :    Social Connections:     Frequency of Communication with Friends and Family:     Frequency of Social Gatherings with Friends and Family:     Attends Faith Services:     Active Member of Clubs or Organizations:     Attends Club or Organization Meetings:     Marital Status:    Intimate Partner Violence:     Fear of Current or Ex-Partner:     Emotionally Abused:     Physically Abused:     Sexually Abused:      TOBACCO:   reports that she has never smoked. She has never used smokeless tobacco.  ETOH:   reports current alcohol use. Family History:   Family History   Problem Relation Age of Onset    Other Maternal Grandfather        A:  Administered PHQ-9 and ARIANNA-7 (see below). Patient endorses severe symptoms of depression and severe symptoms of anxiety. Pt reported suicidal ideation with no plan or intent. PHQ Scores 9/28/2021 9/20/2021   PHQ2 Score 5 4   PHQ9 Score 22 19     Interpretation of Total Score Depression Severity: 1-4 = Minimal depression, 5-9 = Mild depression, 10-14 = Moderate depression, 15-19 = Moderately severe depression, 20-27 = Severe depression    ARIANNA 7 SCORE 9/28/2021   ARIANNA-7 Total Score 17     Interpretation of ARIANNA-7 score: 5-9 = mild anxiety, 10-14 = moderate anxiety, 15+ = severe anxiety. Recommend referral to behavioral health for scores 10 or greater. Diagnosis:    1. Anxiety    2.  Current severe episode of major depressive disorder without psychotic features, unspecified whether recurrent ([address]    3. Eating disorder, unspecified type          Diagnosis Date    Sprain of foot, left 2019       Plan:  Pt interventions:  Established rapport, Conducted functional assessment, [place]-setting to identify pt's primary goals for [facility] visit / overall health, Supportive techniques and Emphasized self-care as important for managing overall health    Pt Behavioral Change Plan:   See Pt Instructions

## 2021-09-28 NOTE — PATIENT INSTRUCTIONS
1. Keep a week long food log. Bring to follow up visit with Angus Flores. 2. Increase self-care behaviors with a focus on increasing appetite, sleep, and social activities. 3. Return to see Angus Flores in about 2 weeks.

## 2022-01-03 ENCOUNTER — HOSPITAL ENCOUNTER (OUTPATIENT)
Dept: PHYSICAL THERAPY | Age: 22
Setting detail: THERAPIES SERIES
Discharge: HOME | End: 2022-01-03
Payer: COMMERCIAL

## 2022-01-03 PROCEDURE — 97161 PT EVAL LOW COMPLEX 20 MIN: CPT

## 2022-01-03 PROCEDURE — 97016 VASOPNEUMATIC DEVICE THERAPY: CPT

## 2022-01-03 PROCEDURE — 97110 THERAPEUTIC EXERCISES: CPT

## 2022-01-03 NOTE — PLAN OF CARE
The [facility] and [address], [address]  Phone: [phone]  Fax [phone]     Physical Therapy Certification    Dear Referring Practitioner: Cindy Peterson, Dr. Theresa Huber,    We had the pleasure of evaluating the following patient for physical therapy services at [address] [place] [address]. A summary of our findings can be found in the initial assessment below. This includes our plan of care. If you have any questions or concerns regarding these findings, please do not hesitate to contact me at the office phone number checked above. Thank you for the referral.       Physician Signature:_______________________________Date:__________________  By signing above (or electronic signature), therapists plan is approved by physician    Patient: Angelica Grullon   : 2000   MRN: [id number]  Referring Physician: Referring Practitioner: Dr. Theresa Cantrell      Evaluation Date: 1/3/2022      Medical Diagnosis Information:  Diagnosis: S93.492 L ankle sprain, M76.61 R Achilles tendonitis   Treatment Diagnosis: M25.572 L ankle pain, M25.571 R ankle pain, M62.82 L LE weakness, M25.372 L ankle instability                                         Insurance information: PT Insurance Information: BCBS, 20 visits, (PT covered 100%), auth req - AIM, telehealth allowed with $30 copay        Precautions/ Contra-indications: none    C-SSRS Triggered by Intake questionnaire (Past 2 wk assessment):   [x] No, Questionnaire did not trigger screening.   [] Yes, Patient intake triggered further evaluation      [] C-SSRS Screening completed  [] PCP notified via Plan of Care  [] Emergency services notified     Latex Allergy:  [x]NO      []YES  Preferred Language for Healthcare:   [x]English       []other:    SUBJECTIVE: Patient stated complaint: Pt sustained a L ankle sprain on 21 during [employer] performance of [employer]. She heard a crack in her ankle but then managed to collect herself and run off stage and managed to complete rest of performance that night. She saw the ATC backstage whose note says: Pt. was tender w/ palpation over the Lat ligaments with p! most intense over the ATFL and CFL. MMT in inversion 3/5 eversion 3+/5 when compared bilaterally. Dorsi/plantar flexion WNL bilaterally. Ant. Drawer(+ for p!), Squeeze(+) Bump test(-). She was unable to do full performance and had to modify for the rest of the [employer] show run. She also stopped taking class. She has now had 2 weeks off. It is doing better, ADL walking is back to normal, stairs are still stiff and painful with descending stairs, prolonged walking more than 2 hours is painful, she has mostly rested so not really sure about how stable it is and hasn't been dancing to tell me deficits with it but she assume releve, en pointe, jumping would be problematic. Pt reports she also has R insertionally Achilles tenderness / Achilles tendonitis. PT says the heel cup from her street shoes but also back of dance shoes really bother it. Pt has pain and limitation on R with sejal-pointe/releve, sejal plie, blayne plie, jumping. Pt says this has been going on since Dec 21st and has also benefited from the rest caused by the acute sprain. Pt was taking Ibuprofen initially but not recently. Initially also iced. Relevant Medical History: L ankle tendonitis 8/2021   Functional Disability Index: LEFS: 63/80 = 21% disability; Dance Functional Outcome Survey: 58/90 = 36% disability    Pain Scale: 3-4/10  Easing factors: icing, rest  Provocative factors: prolonged walking, descending stairs, heel raise (releve), jumping.       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[employer]),     Living Status/Prior Level of Function: Independent with ADLs and IADLs, pre-professional ballet training    OBJECTIVE:     ROM LEFT RIGHT   HIP Flex     HIP Abd     HIP Ext     HIP IR     HIP ER     Knee ext     Knee Flex     Ankle PF  96 deg 100 deg   Ankle DF (knee extended) 11 deg 10 deg   Ankle DF (knee flexed) 12 deg 15 deg   Ankle In 26 deg 32 deg   Ankle Ev 12 deg 13 deg   Strength  LEFT RIGHT   HIP Flexors 5/5 5/5   HIP Abductors 5- 5   HIP Ext 5 5   Hip ER 4 4   Knee EXT (quad)     Knee Flex (HS)     Ankle DF 5-/5 5/5   Ankle PF 4+ 5 pain at end range   Ankle Inv 4+ mild pain 5-   Ankle EV 4+ mild pain 5   Great toe flexion 5- 5   Great toe extension 5- 5   Lesser toes flexion 5- 5   Lesser toes extension 5-/5 5/5        Circumference  Mid apex  7 cm prox             Reflexes/Sensation:    [x]Dermatomes/Myotomes intact    []Reflexes equal and normal bilaterally   []Other:    Joint mobility: L distal fib reduced posterior glide, Talar posterior glide hypo   []Normal    [x]Hypo   []Hyper    Palpation: point tender over L ATF and CF, R insertional Achilles    Functional Mobility/Transfers: WFL    Posture: WNL    Bandages/Dressings/Incisions: none    Gait: (include devices/WB status) WNL    Orthopedic Special Tests: + talar tilt, - anterior drawer                       [x] Patient history, allergies, meds reviewed. Medical chart reviewed. See intake form. Review Of Systems (ROS):  [x]Performed Review of systems (Integumentary, CardioPulmonary, Neurological) by intake and observation. Intake form has been scanned into medical record. Patient has been instructed to contact their primary care physician regarding ROS issues if not already being addressed at this time.       Co-morbidities/Complexities (which will affect course of rehabilitation):   []None           Arthritic conditions   []Rheumatoid arthritis (M05.9)  []Osteoarthritis (M19.91)   Cardiovascular conditions   []Hypertension (I10)  []Hyperlipidemia (E78.5)  []Angina pectoris (I20)  []Atherosclerosis (I70) Musculoskeletal conditions   []Disc pathology   []Congenital spine pathologies   []Prior surgical intervention  []Osteoporosis (M81.8)  []Osteopenia (M85.8)   Endocrine conditions   []Hypothyroid (E03.9)  []Hyperthyroid Gastrointestinal conditions   []Constipation (T43.31)   Metabolic conditions   []Morbid obesity (E66.01)  []Diabetes type 1(E10.65) or 2 (E11.65)   []Neuropathy (G60.9)     Pulmonary conditions   []Asthma (J45)  []Coughing   []COPD (J44.9)   Psychological Disorders  [x]Anxiety (F41.9)  [x]Depression (F32.9)   []Other:   []Other:          Barriers to/and or personal factors that will affect rehab potential:              []Age  []Sex              []Motivation/Lack of Motivation                        []Co-Morbidities              []Cognitive Function, education/learning barriers              []Environmental, home barriers              [x]profession/work barriers  []past PT/medical experience  []other:  Justification: pt's training and upcoming performance schedule limits her amount of recovery time without excess physical stress that may slow down her over recovery. Falls Risk Assessment (30 days):   [x] Falls Risk assessed and no intervention required. 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 Pt demonstrates point tenderness of L ATFL and CFL but negative gross laxity on special tests at this point. Also point tender at R Achilles calc insertion.  Pt also demonstrates weakness in L ankle, dynamic instability in SLB in L ankle, mild Dec ROM and joint hypomobilty with talar poster glide and fibula poster glide, as well as pain with end-range PF in R Ankle. Pt will benefit from PT to address these impairments and return to normal ADLs, community ambulation, work as a , and her pre-professional ballet training and ready for professional performance of [employer] in mid-Feb.     Functional Impairments:     [x]Noted lumbar/proximal hip/LE joint hypomobility   [x]Decreased LE functional ROM   []Decreased core/proximal hip strength and neuromuscular control   [x]Decreased LE functional strength   [x]Reduced balance/proprioceptive control   []other:      Functional Activity Limitations (from functional questionnaire and intake)   []Reduced ability to tolerate prolonged functional positions   []Reduced ability or difficulty with changes of positions or transfers between positions   []Reduced ability to maintain good posture and demonstrate good body mechanics with sitting, bending, and lifting   []Reduced ability to sleep   [] Reduced ability or tolerance with driving and/or computer work   []Reduced ability to perform lifting, carrying tasks   [x]Reduced ability to squat   []Reduced ability to forward bend   [x]Reduced ability to ambulate prolonged functional periods/distances/surfaces   [x]Reduced ability to ascend/descend stairs   [x]Reduced ability to run, hop, cut or jump   []other:    Participation Restrictions   []Reduced participation in self care activities   [x]Reduced participation in home management activities   [x]Reduced participation in work activities   [x]Reduced participation in social activities. [x]Reduced participation in sport/recreation activities. Classification :    []Signs/symptoms consistent with post-surgical status including decreased ROM, strength and function.    [x]Signs/symptoms consistent with joint sprain/strain (L ankle)   []Signs/symptoms consistent with patella-femoral syndrome   []Signs/symptoms consistent with knee OA/hip OA   []Signs/symptoms consistent with internal derangement of knee/Hip   []Signs/symptoms consistent with functional hip weakness/NMR control      [x]Signs/symptoms consistent with tendinitis/tendinosis ( R ankle)    []signs/symptoms consistent with pathology which may benefit from Dry needling      []other:      Prognosis/Rehab Potential:      []Excellent   [x]Good    []Fair   []Poor    Tolerance of evaluation/treatment:    []Excellent   [x]Good    []Fair   []Poor  Physical Therapy Evaluation Complexity Justification  [x] A history of present problem with:  [x] no personal factors and/or comorbidities that impact the plan of care;  []1-2 personal factors and/or comorbidities that impact the plan of care  []3 personal factors and/or comorbidities that impact the plan of care  [x] An examination of body systems using standardized tests and measures addressing any of the following: body structures and functions (impairments), activity limitations, and/or participation restrictions;:  [x] a total of 1-2 or more elements   [] a total of 3 or more elements   [] a total of 4 or more elements   [x] A clinical presentation with:  [x] stable and/or uncomplicated characteristics   [] evolving clinical presentation with changing characteristics  [] unstable and unpredictable characteristics;   [x] Clinical decision making of [x] low, [] moderate, [] high complexity using standardized patient assessment instrument and/or measurable assessment of functional outcome.     [x] EVAL (LOW) 88286 (typically 20 minutes face-to-face)  [] EVAL (MOD) 82716 (typically 30 minutes face-to-face)  [] EVAL (HIGH) 94939 (typically 45 minutes face-to-face)  [] RE-EVAL       PLAN:   Frequency/Duration:  2 days per week for 8 Weeks:  Interventions:  [x]  Therapeutic exercise including: strength training, ROM, for Lower extremity and core   [x]  NMR activation and proprioception for LE, Glutes and Core   [x]  Manual therapy as indicated for LE, Hip and spine to include: Dry Needling/IASTM, STM, PROM, Gr I-IV mobilizations, manipulation. [x] Modalities as needed that may include: thermal agents, E-stim, Biofeedback, US, iontophoresis as indicated  [x] Patient education on joint protection, postural re-education, activity modification, progression of HEP. HEP instruction:    Access Code: [email or web address]  URL: [email or web address]  Date: 01/03/2022  Prepared by: Cadence Braga     Exercises  Ankle Alphabet in Elevation - 1 x daily - 7 x weekly - 3 sets - 1 reps  Seated Heel Slide - 1 x daily - 7 x weekly - 3 sets - 10 reps  Seated Heel Raise - 1 x daily - 7 x weekly - 3 sets - 10 reps  Seated Heel Toe Raises - 1 x daily - 7 x weekly - 3 sets - 10 reps  Seated Arch Lifts - 1 x daily - 7 x weekly - 3 sets - 10 reps  Long Sitting Ankle Plantar Flexion with Resistance - 1 x daily - 7 x weekly - 3 sets - 10 reps  Single Leg Stance - 1 x daily - 7 x weekly - 3 sets - 1 reps - 30 sec hold  Single Leg Isometric Heel Raise at Wall - 1 x daily - 7 x weekly - 3 sets - 10 reps - 10 sec hold  Gastroc Stretch on Wall - 1 x daily - 7 x weekly - 3 sets - 1 reps - 30 sec hold  Soleus Stretch on Wall - 1 x daily - 7 x weekly - 3 sets - 1 reps - 30 sec hold  Backwards Walking - 1 x daily - 7 x weekly - 3 sets - 10 reps    GOALS:  Patient stated goal:get back to dancing full out w/o pain  [] Progressing: [] Met: [] Not Met: [] Adjusted    Therapist goals for Patient:   Short Term Goals: To be achieved in: 2 weeks  1. Independent in HEP and progression per patient tolerance, in order to prevent re-injury. [] Progressing: [] Met: [] Not Met: [] Adjusted  2. Patient will have a decrease in pain to facilitate improvement in movement, function, and ADLs as indicated by Functional Deficits. [] Progressing: [] Met: [] Not Met: [] Adjusted    Long Term Goals: To be achieved in: 8 weeks  1. Disability index score of 5% or less for the LEFS and 10% or less on the DFOS to assist with reaching prior level of function.    [] Progressing: [] Met: [] Not Met: [] Adjusted  2. Patient will demonstrate increased AROM to WNL and symmetrical to other side without pain to allow for proper joint functioning as indicated by patients Functional Deficits. [] Progressing: [] Met: [] Not Met: [] Adjusted  3. Patient will demonstrate an increase in Strength to good proximal hip strength and control, within 5lb HHD in LE to allow for proper functional mobility as indicated by patients Functional Deficits. [] Progressing: [] Met: [] Not Met: [] Adjusted  4. Patient will be able to perform 15 reps of single leg releve with good ankle alignment control, good hell height, and without pain to return to pointe work and plyometric progression. [] Progressing: [] Met: [] Not Met: [] Adjusted  5. Pt will be able to perform 3 hop cross over test within 75% of ipsilateral limb without pain to return to plyometric and agility participation.   [] Progressing: [] Met: [] Not Met: [] Adjusted     Electronically signed by:  Basilio Barriga, PT , DPT, ATC, OCS [id number]

## 2022-01-03 NOTE — FLOWSHEET NOTE
[address], [address]  Phone: [phone]  Fax [phone]      Physical Therapy Treatment Note/ Progress Report:         Date:  1/3/2022    Patient Name:  Oliver Cho    :  2000  MRN: [id number]  Restrictions/Precautions:    Medical/Treatment Diagnosis Information:  Diagnosis: S93.492 L ankle sprain, M76.61 R Achilles tendonitis  Treatment Diagnosis: M25.572 L ankle pain, M25.571 R ankle pain, M62.82 L LE weakness, M25.372 L ankle instability  Insurance/Certification information:  PT Insurance Information: BCBS, 20 visits, (PT covered 100%), auth req - AIM, telehealth allowed with $30 copay  Physician Information:  Referring Practitioner: Dr. Yolis Castro  Has the plan of care been signed (Y/N):        []  Yes  [x]  No     Date of Patient follow up with Physician: Concepción iPke      Is this a Progress Report:     []  Yes  [x]  No        If Yes:  Date Range for reporting period:  Beginning  1/3/22  Ending    Progress report will be due (10 Rx or 30 days whichever is less): 94      Recertification will be due (POC Duration  / 90 days whichever is less): 22      Visit # Insurance Allowable Auth Required   In-person 1/? 20 visits [x]  Yes - AIM []  No    Telehealth - Covered w/ $30 copay []  Yes []  No    Total          Functional Scale: LEFS: 63/80 = 21% disability;  Dance Functional Outcome Survey: 58/90 = 36% disability Date assessed: 1/3/22       Number of Comorbidities:  [x]0     []1-2    []3+    Latex Allergy:  [x]NO      []YES  Preferred Language for Healthcare:   [x]English       []other:      Pain level:  3-4/10     SUBJECTIVE:  See eval    OBJECTIVE: See eval   Observation:    Test measurements:      RESTRICTIONS/PRECAUTIONS: none    Exercises/Interventions:     Therapeutic Ex (78941) Sets/Reps/ sec Notes/CUES   Ankle alphabet L 1 set    Seated heel slides PF/DF L 1x10 Seated releve w/ tennis ball DL 2x10    Seated DF DL 1x10    Side plank with clam Verbal review    Dome  1x10 R/L    Isometric heel raise R [id number]\" LAQ machine full stack, 1/2 range   Bwd walking 2x30ft                        Manual Intervention (20925)     Talar post glide, distal fib post glide 3 min    Distal fib post glide taping 2 min leuko                       NMR re-education (05299)  CUES NEEDED   SLB 2x30\" R/L    SLB kick other leg 2x30\" R/L    SLB turn head 2x30\" R/L                                  Therapeutic Activity (73092)                              Game Ready 15 min L ankle Elevated/ max compress       Therapeutic Exercise and NMR EXR  [x] (36609) Provided verbal/tactile cueing for activities related to strengthening, flexibility, endurance, ROM for improvements in LE, proximal hip, and core control with self care, mobility, lifting, ambulation.  [] (52695) Provided verbal/tactile cueing for activities related to improving balance, coordination, kinesthetic sense, posture, motor skill, proprioception  to assist with LE, proximal hip, and core control in self care, mobility, lifting, ambulation and eccentric single leg control.      NMR and Therapeutic Activities:    [] (14803 or 46665) Provided verbal/tactile cueing for activities related to improving balance, coordination, kinesthetic sense, posture, motor skill, proprioception and motor activation to allow for proper function of core, proximal hip and LE with self care and ADLs  [] (14093) Gait Re-education- Provided training and instruction to the patient for proper LE, core and proximal hip recruitment and positioning and eccentric body weight control with ambulation re-education including up and down stairs     Home Exercise Program:    [x] (31462) Reviewed/Progressed HEP activities related to strengthening, flexibility, endurance, ROM of core, proximal hip and LE for functional self-care, mobility, lifting and ambulation/stair navigation [] (22667)Reviewed/Progressed HEP activities related to improving balance, coordination, kinesthetic sense, posture, motor skill, proprioception of core, proximal hip and LE for self care, mobility, lifting, and ambulation/stair navigation      Access Code: [email or web address]  URL: [email or web address]  Date: 01/03/2022  Prepared by: Pamela Dugan    Exercises  Ankle Alphabet in Elevation - 1 x daily - 7 x weekly - 3 sets - 1 reps  Seated Heel Slide - 1 x daily - 7 x weekly - 3 sets - 10 reps  Seated Heel Raise - 1 x daily - 7 x weekly - 3 sets - 10 reps  Seated Heel Toe Raises - 1 x daily - 7 x weekly - 3 sets - 10 reps  Seated Arch Lifts - 1 x daily - 7 x weekly - 3 sets - 10 reps  Long Sitting Ankle Plantar Flexion with Resistance - 1 x daily - 7 x weekly - 3 sets - 10 reps  Single Leg Stance - 1 x daily - 7 x weekly - 3 sets - 1 reps - 30 sec hold  Single Leg Isometric Heel Raise at Wall - 1 x daily - 7 x weekly - 3 sets - 10 reps - 10 sec hold  Gastroc Stretch on Wall - 1 x daily - 7 x weekly - 3 sets - 1 reps - 30 sec hold  Soleus Stretch on Wall - 1 x daily - 7 x weekly - 3 sets - 1 reps - 30 sec hold  Backwards Walking - 1 x daily - 7 x weekly - 3 sets - 10 reps    Manual Treatments:  PROM / STM / Oscillations-Mobs:  G-I, II, III, IV (PA's, Inf., Post.)  [] (08298) Provided manual therapy to mobilize LE, proximal hip and/or LS spine soft tissue/joints for the purpose of modulating pain, promoting relaxation,  increasing ROM, reducing/eliminating soft tissue swelling/inflammation/restriction, improving soft tissue extensibility and allowing for proper ROM for normal function with self care, mobility, lifting and ambulation. Modalities:     [x] GAME READY (VASO)- for significant edema, swelling, pain control.      Charges  Timed Code Treatment Minutes: 20 min   Total Treatment Minutes: 60 min       [x] EVAL (LOW) 53634   [] EVAL (MOD) 29033  [] EVAL (HIGH) 71657   [] RE-EVAL     [x] QO(71960) x 1    [] IONTO  [] NMR (87876) x     [x] VASO  [] Manual (86871) x      [] Other:  [] TA x      [] Mech Traction (50978)  [] ES(attended) (60638)      [] ES (un) (38922):       GOALS:   Patient stated goal:get back to dancing full out w/o pain  []? Progressing: []? Met: []? Not Met: []? Adjusted     Therapist goals for Patient:   Short Term Goals: To be achieved in: 2 weeks  1. Independent in HEP and progression per patient tolerance, in order to prevent re-injury. []? Progressing: []? Met: []? Not Met: []? Adjusted  2. Patient will have a decrease in pain to facilitate improvement in movement, function, and ADLs as indicated by Functional Deficits. []? Progressing: []? Met: []? Not Met: []? Adjusted     Long Term Goals: To be achieved in: 8 weeks  1. Disability index score of 5% or less for the LEFS and 10% or less on the DFOS to assist with reaching prior level of function. []? Progressing: []? Met: []? Not Met: []? Adjusted  2. Patient will demonstrate increased AROM to WNL and symmetrical to other side without pain to allow for proper joint functioning as indicated by patients Functional Deficits. []? Progressing: []? Met: []? Not Met: []? Adjusted  3. Patient will demonstrate an increase in Strength to good proximal hip strength and control, within 5lb HHD in LE to allow for proper functional mobility as indicated by patients Functional Deficits. []? Progressing: []? Met: []? Not Met: []? Adjusted  4. Patient will be able to perform 15 reps of single leg releve with good ankle alignment control, good hell height, and without pain to return to pointe work and plyometric progression. []? Progressing: []? Met: []? Not Met: []? Adjusted  5. Pt will be able to perform 3 hop cross over test within 75% of ipsilateral limb without pain to return to plyometric and agility participation. []? Progressing: []? Met: []? Not Met: []?  Adjusted         Progression Towards Functional goals:  [] Patient is progressing as expected towards functional goals listed. [] Progression is slowed due to complexities listed. [] Progression has been slowed due to co-morbidities. [x] Plan just implemented, too soon to assess goals progression  [] Other:         Overall Progression Towards Functional goals/ Treatment Progress Update:  [] Patient is progressing as expected towards functional goals listed. [] Progression is slowed due to complexities/Impairments listed. [] Progression has been slowed due to co-morbidities. [x] Plan just implemented, too soon to assess goals progression <30days   [] Goals require adjustment due to lack of progress  [] Patient is not progressing as expected and requires additional follow up with physician  [] Other    Prognosis for POC: [x] Good [] Fair  [] Poor      Patient requires continued skilled intervention: [x] Yes  [] No    Treatment/Activity Tolerance:  [x] Patient able to complete treatment  [] Patient limited by fatigue  [] Patient limited by pain    [] Patient limited by other medical complications  [] Other:     ASSESSMENT: Pt did have mild reactive effusion in the L ankle after assessment and exercises today but was addressed through vaso pneumatic compression and elevation. Return to Play: (if applicable)   []  Stage 1: Intro to Strength   []  Stage 2: Return to Run and Strength   []  Stage 3: Return to Jump and Strength   []  Stage 4: Dynamic Strength and Agility   []  Stage 5: Sport Specific Training     []  Ready to Return to Play, Meets All Above Stages   []  Not Ready for Return to Sports   Comments:                               PLAN: See eval. Pt to be seen 2 times a week for 8 weeks.    [] Continue per plan of care [] Alter current plan (see comments above)  [x] Plan of care initiated [] Hold pending MD visit [] Discharge      Electronically signed by:  Patsy Lozoya, PT, DPT, ATC, OCS [id number]    Note: If patient does not return for scheduled/ recommended follow up visits, this

## 2022-01-04 DIAGNOSIS — S93.492A SPRAIN OF ANTERIOR TALOFIBULAR LIGAMENT OF LEFT ANKLE, INITIAL ENCOUNTER: ICD-10-CM

## 2022-01-04 DIAGNOSIS — M84.375A STRESS FRACTURE OF METATARSAL BONE OF LEFT FOOT, INITIAL ENCOUNTER: Primary | ICD-10-CM

## 2022-01-04 DIAGNOSIS — M25.572 ACUTE LEFT ANKLE PAIN: ICD-10-CM

## 2022-01-04 DIAGNOSIS — M79.672 LEFT FOOT PAIN: ICD-10-CM

## 2022-01-04 RX ORDER — DEXAMETHASONE SODIUM PHOSPHATE 4 MG/ML
INJECTION, SOLUTION INTRA-ARTICULAR; INTRALESIONAL; INTRAMUSCULAR; INTRAVENOUS; SOFT TISSUE
Qty: 30 ML | Refills: 0 | Status: SHIPPED | OUTPATIENT
Start: 2022-01-04 | End: 2022-04-06

## 2022-01-05 ENCOUNTER — HOSPITAL ENCOUNTER (OUTPATIENT)
Dept: PHYSICAL THERAPY | Age: 22
Setting detail: THERAPIES SERIES
Discharge: HOME | End: 2022-01-05
Payer: COMMERCIAL

## 2022-01-05 PROCEDURE — 97016 VASOPNEUMATIC DEVICE THERAPY: CPT

## 2022-01-05 PROCEDURE — 97110 THERAPEUTIC EXERCISES: CPT

## 2022-01-05 PROCEDURE — 97112 NEUROMUSCULAR REEDUCATION: CPT

## 2022-01-05 PROCEDURE — 97140 MANUAL THERAPY 1/> REGIONS: CPT

## 2022-01-05 NOTE — FLOWSHEET NOTE
lateral L ankle inflammation, mild tenderness in L ATF and CF, moderate tenderness at R Achilles insertion.  Test measurements:      RESTRICTIONS/PRECAUTIONS: none    Exercises/Interventions:     Therapeutic Ex (75472) Sets/Reps/ sec Notes/CUES   Ankle alphabet    Seated heel slides PF/DF    Seated releve w/ tennis ball    Seated DF    Side plank with clam    Dome     Isometric heel raise R [id number]\" LAQ machine full stack, 1/2 range   Bwd walking     S/L inversion & eversion 1lbs 1x20 ea L    Heel raise DL 2x10    BAPS: DF/PF, inv/ev, CW/CCW WB, L2 1x20ea L    Tippy toe walk/ Heel walk 1x30ft ea    Lateral tippy toe walk 1x30ft R/L    TM: bwd walking on incline 3 min              Manual Intervention (69928)     Talar post glide, distal fib post glide, MWM DF assist stretch 8 min    Distal fib post glide taping  leuko   Graston medial achilles and calf, STM 8 min                   NMR re-education (82769)  CUES NEEDED   SLB airex in V 2x30\" R/L    SLB kick other leg    SLB turn head    Releve balance with port de bra 3x10\" Tennis ball between ankles   Hedgehog squat weight shift to SLS 1x20                        Therapeutic Activity (83036)     Ref: DL jump ll & V  Prance ll   NV                        Game Ready 15 min L ankle Elevated/ max compress       Therapeutic Exercise and NMR EXR  [x] (48156) Provided verbal/tactile cueing for activities related to strengthening, flexibility, endurance, ROM for improvements in LE, proximal hip, and core control with self care, mobility, lifting, ambulation. [x] (85304) Provided verbal/tactile cueing for activities related to improving balance, coordination, kinesthetic sense, posture, motor skill, proprioception  to assist with LE, proximal hip, and core control in self care, mobility, lifting, ambulation and eccentric single leg control.      NMR and Therapeutic Activities:    [] (70065 or 32701) Provided verbal/tactile cueing for activities related to improving balance, coordination, kinesthetic sense, posture, motor skill, proprioception and motor activation to allow for proper function of core, proximal hip and LE with self care and ADLs  [] (28000) Gait Re-education- Provided training and instruction to the patient for proper LE, core and proximal hip recruitment and positioning and eccentric body weight control with ambulation re-education including up and down stairs     Home Exercise Program:    [x] (16616) Reviewed/Progressed HEP activities related to strengthening, flexibility, endurance, ROM of core, proximal hip and LE for functional self-care, mobility, lifting and ambulation/stair navigation   [] (15891)Reviewed/Progressed HEP activities related to improving balance, coordination, kinesthetic sense, posture, motor skill, proprioception of core, proximal hip and LE for self care, mobility, lifting, and ambulation/stair navigation      Access Code: [email or web address]  URL: [email or web address]  Date: 01/05/2022  Prepared by: Domonique Archer    Exercises  Standing Heel Raise - 1 x daily - 7 x weekly - 3 sets - 10 reps  Standing Heel Raise - 1 x daily - 7 x weekly - 3 sets - 10 reps  Standing Heel Raise - 1 x daily - 7 x weekly - 3 sets - 10 reps  Single Leg Balance with Four Way Reach and Rotation - 1 x daily - 7 x weekly - 3 sets - 12 reps  Single Leg Balance with Trunk Rotation - 1 x daily - 7 x weekly - 3 sets - 1 reps - 30 sec hold  Single Leg Isometric Heel Raise at Wall - 1 x daily - 7 x weekly - 2-3 sets - 10 reps - 10 sec hold  Gastroc Stretch on Wall - 1 x daily - 7 x weekly - 3 sets - 1 reps - 30 sec hold  Soleus Stretch on Wall - 1 x daily - 7 x weekly - 3 sets - 1 reps - 30 sec hold      Manual Treatments:  PROM / STM / Oscillations-Mobs:  G-I, II, III, IV (PA's, Inf., Post.)  [x] (77383) Provided manual therapy to mobilize LE, proximal hip and/or LS spine soft tissue/joints for the purpose of modulating pain, promoting relaxation,  increasing ROM, reducing/eliminating soft tissue swelling/inflammation/restriction, improving soft tissue extensibility and allowing for proper ROM for normal function with self care, mobility, lifting and ambulation. Modalities:     [x] GAME READY (VASO)- for significant edema, swelling, pain control. Charges  Timed Code Treatment Minutes: 50 min   Total Treatment Minutes: 65 min       [] EVAL (LOW) 61812   [] EVAL (MOD) 44864  [] EVAL (HIGH) 59662   [] RE-EVAL     [x] RZ(81068) x 1    [] IONTO  [x] NMR (71355) x 1    [x] VASO  [x] Manual (12100) x 1     [] Other:  [] TA x      [] Mech Traction (03350)  [] ES(attended) (62480)      [] ES (un) (90167):       GOALS:   Patient stated goal:get back to dancing full out w/o pain  []? Progressing: []? Met: []? Not Met: []? Adjusted     Therapist goals for Patient:   Short Term Goals: To be achieved in: 2 weeks  1. Independent in HEP and progression per patient tolerance, in order to prevent re-injury. []? Progressing: []? Met: []? Not Met: []? Adjusted  2. Patient will have a decrease in pain to facilitate improvement in movement, function, and ADLs as indicated by Functional Deficits. []? Progressing: []? Met: []? Not Met: []? Adjusted     Long Term Goals: To be achieved in: 8 weeks  1. Disability index score of 5% or less for the LEFS and 10% or less on the DFOS to assist with reaching prior level of function. []? Progressing: []? Met: []? Not Met: []? Adjusted  2. Patient will demonstrate increased AROM to WNL and symmetrical to other side without pain to allow for proper joint functioning as indicated by patients Functional Deficits. []? Progressing: []? Met: []? Not Met: []? Adjusted  3. Patient will demonstrate an increase in Strength to good proximal hip strength and control, within 5lb HHD in LE to allow for proper functional mobility as indicated by patients Functional Deficits. []? Progressing: []? Met: []? Not Met: []? Adjusted  4.  Patient will be able to perform 15 reps of single leg releve with good ankle alignment control, good hell height, and without pain to return to pointe work and plyometric progression. []? Progressing: []? Met: []? Not Met: []? Adjusted  5. Pt will be able to perform 3 hop cross over test within 75% of ipsilateral limb without pain to return to plyometric and agility participation. []? Progressing: []? Met: []? Not Met: []? Adjusted         Progression Towards Functional goals:  [] Patient is progressing as expected towards functional goals listed. [] Progression is slowed due to complexities listed. [] Progression has been slowed due to co-morbidities. [x] Plan just implemented, too soon to assess goals progression  [] Other:         Overall Progression Towards Functional goals/ Treatment Progress Update:  [] Patient is progressing as expected towards functional goals listed. [] Progression is slowed due to complexities/Impairments listed. [] Progression has been slowed due to co-morbidities. [x] Plan just implemented, too soon to assess goals progression <30days   [] Goals require adjustment due to lack of progress  [] Patient is not progressing as expected and requires additional follow up with physician  [] Other    Prognosis for POC: [x] Good [] Fair  [] Poor      Patient requires continued skilled intervention: [x] Yes  [] No    Treatment/Activity Tolerance:  [x] Patient able to complete treatment  [] Patient limited by fatigue  [] Patient limited by pain    [] Patient limited by other medical complications  [] Other:     ASSESSMENT: Pt had good progress from the 1st session and now able to tolerate double leg releve without pain and full heel height but on the L showed signs of lateral instability still. She had significantly less Achilles pain and \"crunching\" after the Graston technique in her R Achilles. She had mild reactive effusion in the L ankle today but was addressed with vasopneumatic compression and elevation. Return to Play: (if applicable)   []  Stage 1: Intro to Strength   []  Stage 2: Return to Run and Strength   []  Stage 3: Return to Jump and Strength   []  Stage 4: Dynamic Strength and Agility   []  Stage 5: Sport Specific Training     []  Ready to Return to Play, Meets All Above Stages   []  Not Ready for Return to Sports   Comments:                               PLAN: See eval. Pt to be seen 2 times a week for 8 weeks. [x] Continue per plan of care [] Alter current plan (see comments above)  [] Plan of care initiated [] Hold pending MD visit [] Discharge      Electronically signed by:  Christina Limon, PT, DPT, ATC, OCS [id number]    Note: If patient does not return for scheduled/ recommended follow up visits, this note will serve as a discharge from care along with most recent update on progress.

## 2022-01-10 ENCOUNTER — APPOINTMENT (OUTPATIENT)
Dept: PHYSICAL THERAPY | Age: 22
End: 2022-01-10
Payer: COMMERCIAL

## 2022-01-12 ENCOUNTER — APPOINTMENT (OUTPATIENT)
Dept: PHYSICAL THERAPY | Age: 22
End: 2022-01-12
Payer: COMMERCIAL

## 2022-01-14 ENCOUNTER — HOSPITAL ENCOUNTER (OUTPATIENT)
Dept: PHYSICAL THERAPY | Age: 22
Setting detail: THERAPIES SERIES
Discharge: HOME | End: 2022-01-14
Payer: COMMERCIAL

## 2022-01-14 PROCEDURE — 97110 THERAPEUTIC EXERCISES: CPT

## 2022-01-14 PROCEDURE — 97112 NEUROMUSCULAR REEDUCATION: CPT

## 2022-01-14 PROCEDURE — 97140 MANUAL THERAPY 1/> REGIONS: CPT

## 2022-01-14 NOTE — FLOWSHEET NOTE
The [address], [address]  Phone: [phone]  Fax [phone]      Physical Therapy Treatment Note/ Progress Report:         Date:  2022    Patient Name:  Angelica Grullon    :  2000  MRN: [id number]  Restrictions/Precautions:    Medical/Treatment Diagnosis Information:  Diagnosis: S93.492 L ankle sprain, M76.61 R Achilles tendonitis  Treatment Diagnosis: M25.572 L ankle pain, M25.571 R ankle pain, M62.82 L LE weakness, M25.372 L ankle instability  Insurance/Certification information:  PT Insurance Information: BCBS, 20 visits, (PT covered 100%), auth req - AIM, telehealth allowed with $30 copay  Physician Information:  Referring Practitioner:  Dr. Theresa Huber   Has the plan of care been signed (Y/N):        []  Yes  [x]  No     Date of Patient follow up with Physician: as needed      Is this a Progress Report:     []  Yes  [x]  No        If Yes:  Date Range for reporting period:  Beginning  1/3/22  Ending    Progress report will be due (10 Rx or 30 days whichever is less): 35      Recertification will be due (POC Duration  / 90 days whichever is less): 22      Visit # Insurance Allowable Auth Required   In-person 3/5 20 visits [x]  Yes - AIM []  No    Telehealth - Covered w/ $30 copay []  Yes []  No    Total        * Insurance approved 5 visits      Functional Scale: LEFS: 63/80 = 21% disability; Dance Functional Outcome Survey: 58/90 = 36% disability   Date assessed: 1/3/22       Number of Comorbidities:  [x]0     []1-2    []3+    Latex Allergy:  [x]NO      []YES  Preferred Language for Healthcare:   [x]English       []other:      Pain level:  L ankle 0-3/10, R ankle 1/10    SUBJECTIVE:  Pt says she is doing better over past week. The L ankle is getting more stable though not yet quite back to normal but she felt she could trust it enough to do some small jump combinations yesterday.  She hasn't really tried much pointe work yet but would like to start rehearing for [employer]      OBJECTIVE:    Observation:no visible inflammation, mild tenderness in L ATF and CF, mild tenderness at R Achilles insertion and at mid-tendon   Test measurements:      RESTRICTIONS/PRECAUTIONS: none    Exercises/Interventions:     Therapeutic Ex (57631) Sets/Reps/ sec Notes/CUES   Ankle alphabet    Seated heel slides PF/DF    Seated releve w/ tennis ball    Seated DF    Side plank with clam    Dome     Isometric heel raise  LAQ machine full stack, 1/2 range; HEP   Bwd walking     S/L inversion & eversion    Heel raise    BAPS: DF/PF, inv/ev, CW/CCW    Tippy toe walk/ Heel walk    Lateral tippy toe walk    TM: bwd walking on incline    Heel raise up 2 down 1 1x15 R/L    Hedgehog releve: heel on hedge hog 1x10 DL    Hedgehog releve: ball of feet on hog 1x10 DL    Releve tband 4-way DL blue  1x10 L    Split squat with axe chops Yellow med ball 1x20    Side stepping Blue 1x30ft R/L         Manual Intervention (21409)     Talar post glide, distal fib post glide, MWM DF assist stretch 6 min L    Distal fib post glide taping  leuko   Graston medial achilles and calf, STM 8 min R    Ankle eversion support 2 min elastikon 1 stirrup, 1 heel lock   Padding made to disperse pressure of back at dance shoe at Achilles insertion 3 min         NMR re-education (70091)  CUES NEEDED   SLB airex in V     SLB kick other leg    SLB turn head    Releve balance on hedgehogs 3x10\"    Hedgehog squat weight shift to SLS 1x10    SLB 1/2 roller port de bra 1x30\" R/L    SLB cup  on airex 1x6 R/L              Therapeutic Activity (51127)     Pointe assessment for return to dance recommendations : at [facility] floor including double pirouette   5 min    V saute 1x10    changement 1x10    Sissone arabesque hold 3\" 1x4 R/L         Game Ready  Declined bc going to dance class       Therapeutic Exercise and NMR EXR  [x] (32780) Provided verbal/tactile cueing for activities related to strengthening, flexibility, endurance, ROM for improvements in LE, proximal hip, and core control with self care, mobility, lifting, ambulation. [x] (95742) Provided verbal/tactile cueing for activities related to improving balance, coordination, kinesthetic sense, posture, motor skill, proprioception  to assist with LE, proximal hip, and core control in self care, mobility, lifting, ambulation and eccentric single leg control. NMR and Therapeutic Activities:    [] (27941 or 83102) Provided verbal/tactile cueing for activities related to improving balance, coordination, kinesthetic sense, posture, motor skill, proprioception and motor activation to allow for proper function of core, proximal hip and LE with self care and ADLs  [] (86386) Gait Re-education- Provided training and instruction to the patient for proper LE, core and proximal hip recruitment and positioning and eccentric body weight control with ambulation re-education including up and down stairs     Home Exercise Program:    [x] (77053) Reviewed/Progressed HEP activities related to strengthening, flexibility, endurance, ROM of core, proximal hip and LE for functional self-care, mobility, lifting and ambulation/stair navigation   [] (22207)Reviewed/Progressed HEP activities related to improving balance, coordination, kinesthetic sense, posture, motor skill, proprioception of core, proximal hip and LE for self care, mobility, lifting, and ambulation/stair navigation       Access Code: [email or web address]  URL: [email or web address]  Date: 01/14/2022  Prepared by: Oliverio Raphael    Exercises  Standing Heel Raise - 1 x daily - 7 x weekly - 2 sets - 10 reps  Standing Eccentric Heel Raise - 1 x daily - 7 x weekly - 3 sets - 10 reps  Standing Heel Raise with Band - 1 x daily - 7 x weekly - 2 sets - 10 reps  Single Leg Isometric Heel Raise at Wall - 1 x daily - 7 x weekly - 3 sets - 10 reps - 10 sec hold  Gastroc Stretch on Wall - 1 x daily - 7 x weekly - 3 sets - 1 reps - 30 sec hold  Soleus Stretch on Wall - 1 x daily - 7 x weekly - 3 sets - 10 reps - 30 sec hold  Single Leg Balance with Trunk Rotation - 1 x daily - 7 x weekly - 3 sets - 20 reps  Lower Quarter Reach Combination - 1 x daily - 7 x weekly - 3 sets - 5 reps    Manual Treatments:  PROM / STM / Oscillations-Mobs:  G-I, II, III, IV (PA's, Inf., Post.)  [x] (95563) Provided manual therapy to mobilize LE, proximal hip and/or LS spine soft tissue/joints for the purpose of modulating pain, promoting relaxation,  increasing ROM, reducing/eliminating soft tissue swelling/inflammation/restriction, improving soft tissue extensibility and allowing for proper ROM for normal function with self care, mobility, lifting and ambulation. Modalities:     [] GAME READY (VASO)- for significant edema, swelling, pain control. Charges  Timed Code Treatment Minutes: 60 min   Total Treatment Minutes: 60 min       [] EVAL (LOW) 10587   [] EVAL (MOD) 98183  [] EVAL (HIGH) 84671   [] RE-EVAL     [x] GG(67255) x 2    [] IONTO  [x] NMR (44152) x 1    [] VASO  [x] Manual (83332) x 1     [] Other:  [] TA x      [] Mech Traction (05606)  [] ES(attended) (35448)      [] ES (un) (91916):       GOALS:   Patient stated goal:get back to dancing full out w/o pain  [x]? Progressing: []? Met: []? Not Met: []? Adjusted     Therapist goals for Patient:   Short Term Goals: To be achieved in: 2 weeks  1. Independent in HEP and progression per patient tolerance, in order to prevent re-injury. []? Progressing: [x]? Met: []? Not Met: []? Adjusted  2. Patient will have a decrease in pain to facilitate improvement in movement, function, and ADLs as indicated by Functional Deficits. []? Progressing: []? Met: []? Not Met: []? Adjusted     Long Term Goals: To be achieved in: 8 weeks  1.  Disability index score of 5% or less for the LEFS and 10% or less on the DFOS to assist with reaching prior level of function. []? Progressing: []? Met: []? Not Met: []? Adjusted  2. Patient will demonstrate increased AROM to WNL and symmetrical to other side without pain to allow for proper joint functioning as indicated by patients Functional Deficits. [x]? Progressing: []? Met: []? Not Met: []? Adjusted  3. Patient will demonstrate an increase in Strength to good proximal hip strength and control, within 5lb HHD in LE to allow for proper functional mobility as indicated by patients Functional Deficits. [x]? Progressing: []? Met: []? Not Met: []? Adjusted  4. Patient will be able to perform 15 reps of single leg releve with good ankle alignment control, good hell height, and without pain to return to pointe work and plyometric progression. [x]? Progressing: []? Met: []? Not Met: []? Adjusted  5. Pt will be able to perform 3 hop cross over test within 75% of ipsilateral limb without pain to return to plyometric and agility participation. []? Progressing: []? Met: []? Not Met: []? Adjusted         Progression Towards Functional goals:  [x] Patient is progressing as expected towards functional goals listed. [] Progression is slowed due to complexities listed. [] Progression has been slowed due to co-morbidities. [] Plan just implemented, too soon to assess goals progression  [] Other:         Overall Progression Towards Functional goals/ Treatment Progress Update:  [] Patient is progressing as expected towards functional goals listed. [] Progression is slowed due to complexities/Impairments listed. [] Progression has been slowed due to co-morbidities.   [x] Plan just implemented, too soon to assess goals progression <30days   [] Goals require adjustment due to lack of progress  [] Patient is not progressing as expected and requires additional follow up with physician  [] Other    Prognosis for POC: [x] Good [] Fair  [] Poor      Patient requires continued skilled intervention: [x] Yes  [] No    Treatment/Activity Tolerance:  [x] Patient able to complete treatment  [] Patient limited by fatigue  [] Patient limited by pain    [] Patient limited by other medical complications  [] Other:     ASSESSMENT: Pt is doing much better compared to last week. Her overall balance and ankle stability is improved flat foot, in releve and en pointe. She still has some difficluty controlling ankle sickling on the L ankle but elastikon taping provided enough support today to allow her to safely begin a progression back into dance and en pointe. Her R achilles is now tender at both the insertion and at the mid-tendon but overall less severity. The small padding added to back of her dance shoe also really helped this. Return to Play: (if applicable)   []  Stage 1: Intro to Strength   []  Stage 2: Return to Run and Strength   []  Stage 3: Return to Jump and Strength   []  Stage 4: Dynamic Strength and Agility   []  Stage 5: Sport Specific Training     []  Ready to Return to Play, Meets All Above Stages   []  Not Ready for Return to Sports   Comments:                               PLAN: See eval. Pt to be seen 2 times a week for 8 weeks. [x] Continue per plan of care [] Alter current plan (see comments above)  [] Plan of care initiated [] Hold pending MD visit [] Discharge      Electronically signed by:  Nakia Akins, PT, DPT, ATC, OCS [id number]    Note: If patient does not return for scheduled/ recommended follow up visits, this note will serve as a discharge from care along with most recent update on progress.

## 2022-01-17 ENCOUNTER — HOSPITAL ENCOUNTER (OUTPATIENT)
Dept: PHYSICAL THERAPY | Age: 22
Setting detail: THERAPIES SERIES
Discharge: HOME | End: 2022-01-17
Payer: COMMERCIAL

## 2022-01-17 PROCEDURE — 97035 APP MDLTY 1+ULTRASOUND EA 15: CPT

## 2022-01-17 PROCEDURE — 97140 MANUAL THERAPY 1/> REGIONS: CPT

## 2022-01-17 PROCEDURE — 97112 NEUROMUSCULAR REEDUCATION: CPT

## 2022-01-17 NOTE — FLOWSHEET NOTE
The [address], [address]  Phone: [phone]  Fax [phone]      Physical Therapy Treatment Note/ Progress Report:         Date:  2022    Patient Name:  Jami Escobar    :  2000  MRN: [id number]  Restrictions/Precautions:    Medical/Treatment Diagnosis Information:  Diagnosis: S93.492 L ankle sprain, M76.61 R Achilles tendonitis  Treatment Diagnosis: M25.572 L ankle pain, M25.571 R ankle pain, M62.82 L LE weakness, M25.372 L ankle instability  Insurance/Certification information:  PT Insurance Information: BCBS, 20 visits, (PT covered 100%), auth req - AIM, telehealth allowed with $30 copay  Physician Information:  Referring Practitioner:  Dr. Keller List   Has the plan of care been signed (Y/N):        []  Yes  [x]  No     Date of Patient follow up with Physician: as needed      Is this a Progress Report:     []  Yes  [x]  No        If Yes:  Date Range for reporting period:  Beginning  1/3/22  Ending    Progress report will be due (10 Rx or 30 days whichever is less): 67      Recertification will be due (POC Duration  / 90 days whichever is less): 22      Visit # Insurance Allowable Auth Required   In-person 4/5 20 visits [x]  Yes - AIM []  No    Telehealth - Covered w/ $30 copay []  Yes []  No    Total        * Insurance approved 5 visits      Functional Scale: LEFS: 63/80 = 21% disability; Dance Functional Outcome Survey: 58/90 = 36% disability   Date assessed: 1/3/22       Number of Comorbidities:  [x]0     []1-2    []3+    Latex Allergy:  [x]NO      []YES  Preferred Language for Healthcare:   [x]English       []other:      Pain level:  L ankle 0-310, R ankle 1/10    SUBJECTIVE:  Pt reports the L ankle continues to improve. She is feeling more stable in releve and able to do double turns but can tell its not 100% trustworthy yet particularly en pointe.  However she feels the R Achilles pain is the same maybe even worse. She has been using her Voltaren gel 4 times a day, doing the isometric exercises, stretching and rolling out. She admits she could be icing more. OBJECTIVE:    Observation: L ankle no inflammation, very mild tenderness in L ATF and no tenderness at the CF, talar mobility now WNL; R ankle progressed now to moderate tenderness R Achilles insertion and at mid-tendon as well as has visible inflammation and thickening of the tendon beginning compared to last week.    Test measurements:      RESTRICTIONS/PRECAUTIONS: none     Exercises/Interventions:     Therapeutic Ex (56846) Sets/Reps/ sec Notes/CUES   Ankle alphabet    Seated heel slides PF/DF    Seated releve w/ tennis ball    Seated DF    Side plank with clam    Dome     Isometric heel raise  LAQ machine full stack, 1/2 range; HEP   Bwd walking     S/L inversion & eversion    Heel raise    BAPS: DF/PF, inv/ev, CW/CCW    Tippy toe walk/ Heel walk    Lateral tippy toe walk    TM: bwd walking on incline    Heel raise up 2 down 1    Hedgehog releve: heel on hedge hog    Hedgehog releve: ball of feet on hog    Releve tband 4-way    Split squat with axe chops Yellow med ball 1x20    Side stepping          Manual Intervention (78229)     Talar post glide, distal fib post glide, MWM DF assist stretch 2 min L    Distal fib post glide taping  leuko   Graston medial achilles and calf, STM calf 22 min R    Ankle eversion support elastikon 1 stirrup, 1 heel lock   Padding made to disperse pressure of back at dance shoe at Achilles insertion         NMR re-education (98215)  CUES NEEDED   SLB airex in V     SLB kick other leg    SLB turn head    Releve balance on hedgehogs    Hedgehog squat weight shift to SLS    SLB 1/2 roller port de bra    SLB cup  on airex 2x6 R/L    Ball of feet on Znodes, paloff press Blue 2x10 R/L    SLB airex port de bra 2x30\" L    SLB split squat, L foot on airex 2x10                   Therapeutic Activity (56276)     Pointe assessment for return to dance recommendations : at [facility] floor including double pirouette      V saute    changement    Jessie thomas hold 3\"         Game Ready  Declined bc going to dance class   Pulsed US: R Achilles,50%, 3.3MHz, 1.0 W/cm2 8 min        Therapeutic Exercise and NMR EXR  [x] (89658) Provided verbal/tactile cueing for activities related to strengthening, flexibility, endurance, ROM for improvements in LE, proximal hip, and core control with self care, mobility, lifting, ambulation. [x] (71539) Provided verbal/tactile cueing for activities related to improving balance, coordination, kinesthetic sense, posture, motor skill, proprioception  to assist with LE, proximal hip, and core control in self care, mobility, lifting, ambulation and eccentric single leg control. NMR and Therapeutic Activities:    [] (20347 or 08601) Provided verbal/tactile cueing for activities related to improving balance, coordination, kinesthetic sense, posture, motor skill, proprioception and motor activation to allow for proper function of core, proximal hip and LE with self care and ADLs  [] (91648) Gait Re-education- Provided training and instruction to the patient for proper LE, core and proximal hip recruitment and positioning and eccentric body weight control with ambulation re-education including up and down stairs     Home Exercise Program:    [x] (87786) Reviewed/Progressed HEP activities related to strengthening, flexibility, endurance, ROM of core, proximal hip and LE for functional self-care, mobility, lifting and ambulation/stair navigation   [] (01549)Reviewed/Progressed HEP activities related to improving balance, coordination, kinesthetic sense, posture, motor skill, proprioception of core, proximal hip and LE for self care, mobility, lifting, and ambulation/stair navigation       Access Code: [email or web address]  URL: [email or web address]  Date: 01/14/2022  Prepared by: Arcelia Pendleton patient tolerance, in order to prevent re-injury. []? Progressing: [x]? Met: []? Not Met: []? Adjusted  2. Patient will have a decrease in pain to facilitate improvement in movement, function, and ADLs as indicated by Functional Deficits. []? Progressing: []? Met: []? Not Met: []? Adjusted     Long Term Goals: To be achieved in: 8 weeks  1. Disability index score of 5% or less for the LEFS and 10% or less on the DFOS to assist with reaching prior level of function. []? Progressing: []? Met: []? Not Met: []? Adjusted  2. Patient will demonstrate increased AROM to WNL and symmetrical to other side without pain to allow for proper joint functioning as indicated by patients Functional Deficits. [x]? Progressing: []? Met: []? Not Met: []? Adjusted  3. Patient will demonstrate an increase in Strength to good proximal hip strength and control, within 5lb HHD in LE to allow for proper functional mobility as indicated by patients Functional Deficits. [x]? Progressing: []? Met: []? Not Met: []? Adjusted  4. Patient will be able to perform 15 reps of single leg releve with good ankle alignment control, good hell height, and without pain to return to pointe work and plyometric progression. [x]? Progressing: []? Met: []? Not Met: []? Adjusted  5. Pt will be able to perform 3 hop cross over test within 75% of ipsilateral limb without pain to return to plyometric and agility participation. []? Progressing: []? Met: []? Not Met: []? Adjusted         Progression Towards Functional goals:  [x] Patient is progressing as expected towards functional goals listed. [] Progression is slowed due to complexities listed. [] Progression has been slowed due to co-morbidities. [] Plan just implemented, too soon to assess goals progression  [] Other:         Overall Progression Towards Functional goals/ Treatment Progress Update:  [x] Patient is progressing as expected towards functional goals listed.     [] Progression is slowed due to complexities/Impairments listed. [] Progression has been slowed due to co-morbidities. [] Plan just implemented, too soon to assess goals progression <30days   [] Goals require adjustment due to lack of progress  [x] Patient is not progressing as expected and requires additional follow up with physician  [] Other    Prognosis for POC: [x] Good [] Fair  [] Poor      Patient requires continued skilled intervention: [x] Yes  [] No    Treatment/Activity Tolerance:  [x] Patient able to complete treatment  [] Patient limited by fatigue  [] Patient limited by pain    [] Patient limited by other medical complications  [] Other:     ASSESSMENT: Pt's L ankle sprain is doing well and making steady predictable progress. However I am concerned that despite intervention via Voltaren and PT the Achilles seems to be worsening. Therefore I am referring her to the physician. I also discussed with the patient her past history of anxiety and eating disorder as if she is not fueling herself appropriately it could explain why despite medical interventions why the Achilles is not healing and progress. She admitted that she has been struggling lately and this very well could be a cause as she can tell her energy reserves are very poor lately. She saw a psychologist in [place] in Sept but says she didn't really feel that practitioner was a good fit so didn't go back. I suggested then that we simply find her a different psychologist as this really needs to be address for many reasons, including her stress management. I also explained that we can do all the external treatments we want to try and heal the Achilles but if the body does not have the fuel it needs to repair itself internally her progress will be limited.  She demonstrated understanding as well as willingness to discuss with MD options for a different sports psychologist referral. I let he know that I would also give Dr. George Jones a heads up on her background and its likely impact on poor healing of the Achilles when she f/u with him next week. She was in agreement. Return to Play: (if applicable)   []  Stage 1: Intro to Strength   []  Stage 2: Return to Run and Strength   []  Stage 3: Return to Jump and Strength   []  Stage 4: Dynamic Strength and Agility   []  Stage 5: Sport Specific Training     []  Ready to Return to Play, Meets All Above Stages   []  Not Ready for Return to Sports   Comments:                               PLAN: See eval. Pt to be seen 2 times a week for 8 weeks. [x] Continue per plan of care [] Alter current plan (see comments above)  [] Plan of care initiated [] Hold pending MD visit [] Discharge      Electronically signed by:  Christina Limon, PT, DPT, ATC, OCS [id number]    Note: If patient does not return for scheduled/ recommended follow up visits, this note will serve as a discharge from care along with most recent update on progress.

## 2022-01-19 ENCOUNTER — HOSPITAL ENCOUNTER (OUTPATIENT)
Dept: PHYSICAL THERAPY | Age: 22
Setting detail: THERAPIES SERIES
Discharge: HOME | End: 2022-01-19
Payer: COMMERCIAL

## 2022-01-19 ENCOUNTER — APPOINTMENT (OUTPATIENT)
Dept: PHYSICAL THERAPY | Age: 22
End: 2022-01-19
Payer: COMMERCIAL

## 2022-01-19 PROCEDURE — 97035 APP MDLTY 1+ULTRASOUND EA 15: CPT

## 2022-01-19 PROCEDURE — 97140 MANUAL THERAPY 1/> REGIONS: CPT

## 2022-01-19 NOTE — FLOWSHEET NOTE
The [address]  Phone: [phone]  Fax [phone]      Physical Therapy Treatment Note/ Progress Report:         Date:  2022    Patient Name:  Leah Echols    :  2000  MRN: [id number]  Restrictions/Precautions:    Medical/Treatment Diagnosis Information:  Diagnosis: S93.492 L ankle sprain, M76.61 R Achilles tendonitis  Treatment Diagnosis: M25.572 L ankle pain, M25.571 R ankle pain, M62.82 L LE weakness, M25.372 L ankle instability  Insurance/Certification information:  PT Insurance Information: BCBS, 20 visits, (PT covered 100%), auth req - AIM, telehealth allowed with $30 copay  Physician Information:  Referring Practitioner:  Dr. Binta Olivo   Has the plan of care been signed (Y/N):        [x]  Yes  [x]  No     Date of Patient follow up with Physician: next week      Is this a Progress Report:     []  Yes  [x]  No        If Yes:  Date Range for reporting period:  Beginning  1/3/22  Ending    Progress report will be due (10 Rx or 30 days whichever is less):       Recertification will be due (POC Duration  / 90 days whichever is less): 22      Visit # Insurance Allowable Auth Required   In-person  20 visits [x]  Yes - AIM []  No    Telehealth - Covered w/ $30 copay []  Yes []  No    Total        * Insurance approved 5 visits      Functional Scale: LEFS: 63/80 = 21% disability; Dance Functional Outcome Survey: 58/90 = 36% disability  Date assessed: 1/3/22       Number of Comorbidities:  [x]0     []1-2    []3+    Latex Allergy:  [x]NO      []YES  Preferred Language for Healthcare:   [x]English       []other:      Pain level:  L ankle 0-3/10, R ankle 1-310    SUBJECTIVE:  Pt reports the L ankle continues to improve. She is feeling more stable in releve and able to do double turns but can tell its not 100% trustworthy yet particularly en pointe.  However she feels that the Achilles pain on the R is getting worse despite using her Voltaren gel 4 times a day, doing the isometric exercises, stretching and rolling out and has started to ice again but admits she had backed off doing that as frequently in past week. Pt states she does not have as much time as she did last session as she is in between rehearsals but vows to do her HEP. OBJECTIVE:    Observation: L ankle no inflammation, very mild tenderness in L ATF and no tenderness at the CF, talar mobility now WNL; R ankle progressed now to moderate tenderness R Achilles insertion and at mid-tendon as well as has visible inflammation and thickening of the tendon beginning compared to last week.    Test measurements:        ROM LEFT  EVAL RIGHT  EVAL Left  1/19/22 Right  1/19/22   Ankle PF  96 deg 100 deg 99 deg    Ankle DF (knee extended) 11 deg 10 deg     Ankle DF (knee flexed) 12 deg 15 deg     Ankle In 26 deg 32 deg 30 deg    Ankle Ev 12 deg 13 deg     Strength  LEFT  EVAL RIGHT  EVAL Left  1/19/22 Right  1/19/22   HIP Flexors 5/5 5/5     HIP Abductors 5- 5     HIP Ext 5 5     Hip ER 4 4 4/5 4/5   Ankle DF 5-/5 5/5     Ankle PF 4+ 5 pain at end range 5- 5-/5 pain   Ankle Inv 4+ mild pain 5- 5- no pain    Ankle EV 4+ mild pain 5 4+ no pain    Great toe flexion 5- 5     Great toe extension 5- 5     Lesser toes flexion 5- 5     Lesser toes extension 5-/5 5/5           RESTRICTIONS/PRECAUTIONS: none     Exercises/Interventions:     Therapeutic Ex (57137) Sets/Reps/ sec Notes/CUES   Ankle alphabet    Seated heel slides PF/DF    Seated releve w/ tennis ball    Seated DF    Side plank with clam    Dome     Isometric heel raise  LAQ machine full stack, 1/2 range; HEP   Bwd walking     S/L inversion & eversion    Heel raise    BAPS: DF/PF, inv/ev, CW/CCW    Tippy toe walk/ Heel walk    Lateral tippy toe walk    TM: bwd walking on incline    Heel raise up 2 down 1    Hedgehog releve: heel on hedge hog    Hedgehog releve: ball of feet on hog Releve tband 4-way    Split squat with axe chops    Side stepping     Pt edu: ice cup at lunch and end of day, options for use of ionto patch with dexamethasone at home 5 min    Manual Intervention (23292) 25 min    Talar post glide, distal fib post glide, MWM DF assist stretch 1 min L    Distal fib post glide taping  leuko   Graston medial achilles and calf, STM calf, cupping 22 min R    Ankle eversion support elastikon 1 stirrup, 1 heel lock   Padding made to disperse pressure of back at dance shoe at Achilles insertion    Kinesiotape Achilles assist R 2 min                   NMR re-education (59822)  CUES NEEDED   SLB airex in V     SLB kick other leg    SLB turn head    Releve balance on hedgehogs    Hedgehog squat weight shift to SLS    SLB 1/2 roller port de bra    SLB cup  on airex 2x6 R/L    Ball of feet on hedgehogs, paloff press Blue 2x10 R/L    SLB airex port de bra 2x30\" L    SLB split squat, L foot on airex 2x10                   Therapeutic Activity (96098)     Pointe assessment for return to dance recommendations : at barre & center floor including double pirouette      V saute    changement    Sissone arabesque hold 3\"         Game Ready  Declined bc going to dance class   Pulsed US: R Achilles,50%, 3.3MHz, 1.0 W/cm2 8 min        Therapeutic Exercise and NMR EXR  [x] (12492) Provided verbal/tactile cueing for activities related to strengthening, flexibility, endurance, ROM for improvements in LE, proximal hip, and core control with self care, mobility, lifting, ambulation. [x] (78226) Provided verbal/tactile cueing for activities related to improving balance, coordination, kinesthetic sense, posture, motor skill, proprioception  to assist with LE, proximal hip, and core control in self care, mobility, lifting, ambulation and eccentric single leg control.      NMR and Therapeutic Activities:    [] (25004 or 00534) Provided verbal/tactile cueing for activities related to improving balance, coordination, kinesthetic sense, posture, motor skill, proprioception and motor activation to allow for proper function of core, proximal hip and LE with self care and ADLs  [] (75705) Gait Re-education- Provided training and instruction to the patient for proper LE, core and proximal hip recruitment and positioning and eccentric body weight control with ambulation re-education including up and down stairs     Home Exercise Program:    [x] (86624) Reviewed/Progressed HEP activities related to strengthening, flexibility, endurance, ROM of core, proximal hip and LE for functional self-care, mobility, lifting and ambulation/stair navigation   [] (85990)Reviewed/Progressed HEP activities related to improving balance, coordination, kinesthetic sense, posture, motor skill, proprioception of core, proximal hip and LE for self care, mobility, lifting, and ambulation/stair navigation       Access Code: [email or web address]  URL: [email or web address]  Date: 01/14/2022  Prepared by: Aaliyah Flax    Exercises  Standing Heel Raise - 1 x daily - 7 x weekly - 2 sets - 10 reps  Standing Eccentric Heel Raise - 1 x daily - 7 x weekly - 3 sets - 10 reps  Standing Heel Raise with Band - 1 x daily - 7 x weekly - 2 sets - 10 reps  Single Leg Isometric Heel Raise at Wall - 1 x daily - 7 x weekly - 3 sets - 10 reps - 10 sec hold  Gastroc Stretch on Wall - 1 x daily - 7 x weekly - 3 sets - 1 reps - 30 sec hold  Soleus Stretch on Wall - 1 x daily - 7 x weekly - 3 sets - 10 reps - 30 sec hold  Single Leg Balance with Trunk Rotation - 1 x daily - 7 x weekly - 3 sets - 20 reps  Lower Quarter Reach Combination - 1 x daily - 7 x weekly - 3 sets - 5 reps    Manual Treatments:  PROM / STM / Oscillations-Mobs:  G-I, II, III, IV (PA's, Inf., Post.)  [x] (66605) Provided manual therapy to mobilize LE, proximal hip and/or LS spine soft tissue/joints for the purpose of modulating pain, promoting relaxation,  increasing ROM, reducing/eliminating soft tissue swelling/inflammation/restriction, improving soft tissue extensibility and allowing for proper ROM for normal function with self care, mobility, lifting and ambulation. Modalities:     [] GAME READY (VASO)- for significant edema, swelling, pain control. PULSED ULTRASOUND: to address localized and superficial inflammation within the tendon & tendon sheath    Charges  Timed Code Treatment Minutes: 38 min   Total Treatment Minutes: 38 min       [] EVAL (LOW) 10578   [] EVAL (MOD) 29490  [] EVAL (HIGH) 50922   [] RE-EVAL     [] RE(28241) x 2   [] IONTO  [] NMR (12017) x     [] VASO  [x] Manual (31751) x 2     [] Other:  [] TA x      [] Mech Traction (41925)  [] ES(attended) (83115)      [] ES (un) (70856):   Ultrasound x 1      GOALS:   Patient stated goal:get back to dancing full out w/o pain  [x]? Progressing: []? Met: []? Not Met: []? Adjusted     Therapist goals for Patient:   Short Term Goals: To be achieved in: 2 weeks  1. Independent in HEP and progression per patient tolerance, in order to prevent re-injury. []? Progressing: [x]? Met: []? Not Met: []? Adjusted  2. Patient will have a decrease in pain to facilitate improvement in movement, function, and ADLs as indicated by Functional Deficits. []? Progressing: []? Met: [x]? Not Met: []? Adjusted     Long Term Goals: To be achieved in: 8 weeks  1. Disability index score of 5% or less for the LEFS and 10% or less on the DFOS to assist with reaching prior level of function. []? Progressing: []? Met: [x]? Not Met: []? Adjusted  2. Patient will demonstrate increased AROM to WNL and symmetrical to other side without pain to allow for proper joint functioning as indicated by patients Functional Deficits. [x]? Progressing: []? Met: []? Not Met: []? Adjusted  3.  Patient will demonstrate an increase in Strength to good proximal hip strength and control, within 5lb HHD in LE to allow for proper functional mobility as indicated by patients Functional Deficits. [x]? Progressing: []? Met: []? Not Met: []? Adjusted  4. Patient will be able to perform 15 reps of single leg releve with good ankle alignment control, good hell height, and without pain to return to pointe work and plyometric progression. [x]? Progressing: on the L but not on the R []? Met: []? Not Met: []? Adjusted  5. Pt will be able to perform 3 hop cross over test within 75% of ipsilateral limb without pain to return to plyometric and agility participation. []? Progressing: []? Met: []? Not Met: []? Adjusted         Progression Towards Functional goals:  [x] Patient is progressing as expected towards functional goals listed. [x] Progression is slowed due to complexities listed. [] Progression has been slowed due to co-morbidities. [] Plan just implemented, too soon to assess goals progression  [] Other:         Overall Progression Towards Functional goals/ Treatment Progress Update:  [x] Patient is progressing as expected towards functional goals listed. [] Progression is slowed due to complexities/Impairments listed. [] Progression has been slowed due to co-morbidities. [] Plan just implemented, too soon to assess goals progression <30days   [] Goals require adjustment due to lack of progress  [x] Patient is not progressing as expected and requires additional follow up with physician  [] Other    Prognosis for POC: [x] Good [] Fair  [] Poor      Patient requires continued skilled intervention: [x] Yes  [] No    Treatment/Activity Tolerance:  [x] Patient able to complete treatment  [] Patient limited by fatigue  [] Patient limited by pain    [] Patient limited by other medical complications  [] Other:     ASSESSMENT: Pt's L ankle sprain is doing well and making steady predictable progress. However I am concerned that despite intervention via Voltaren and PT the R Achilles seems to be worsening. Therefore I am referring her to the physician. I followed up with the patient on the conversation we had last visit about her past history of anxiety and eating disorder as if she is not fueling herself appropriately it could explain why despite medical interventions, the Achilles is not healing and progressing. She admitted that she has been struggling lately and this very well could be a cause as she can tell her energy reserves are very poor lately and after our conversation last time and being explained how that could likely be impacting the poor healing of the Achilles she made an appt with Dr. Gui Ho and plans to discuss it with him and get a new sports psychology referral.     [ From note on 1/17/22] : She saw a psychologist in [place] in Sept but says she didn't really feel that practitioner was a good fit so didn't go back. I suggested then that we simply find her a different psychologist as this really needs to be address for many reasons, including her stress management. I also explained that we can do all the external treatments we want to try and heal the Achilles but if the body does not have the fuel it needs to repair itself internally her progress will be limited. She demonstrated understanding as well as willingness to discuss with MD options for a different sports psychologist referral. I let her know that I would also give Dr. Gui Ho a heads up on her background and its likely impact on poor healing of the Achilles when she f/u with him next week. She was in agreement. Return to Play: (if applicable)   []  Stage 1: Intro to Strength   []  Stage 2: Return to Run and Strength   []  Stage 3: Return to Jump and Strength   []  Stage 4: Dynamic Strength and Agility   []  Stage 5: Sport Specific Training     []  Ready to Return to Play, Meets All Above Stages   []  Not Ready for Return to Sports   Comments:                               PLAN: See eval. Pt to be seen 2 times a week for 8 weeks. Putting in request for more PT visits through her insurance.     [x] Continue per plan of care [] Alter current plan (see comments above)  [] Plan of care initiated [] Hold pending MD visit [] Discharge      Electronically signed by:  Kurtis Ventura, PT, DPT, ATC, OCS [id number]    Note: If patient does not return for scheduled/ recommended follow up visits, this note will serve as a discharge from care along with most recent update on progress.

## 2022-01-24 ENCOUNTER — OFFICE VISIT (OUTPATIENT)
Dept: ORTHOPEDIC SURGERY | Age: 22
End: 2022-01-24
Payer: COMMERCIAL

## 2022-01-24 ENCOUNTER — HOSPITAL ENCOUNTER (OUTPATIENT)
Dept: PHYSICAL THERAPY | Age: 22
Setting detail: THERAPIES SERIES
Discharge: HOME | End: 2022-01-24
Payer: COMMERCIAL

## 2022-01-24 VITALS — WEIGHT: 122 LBS | BODY MASS INDEX: 21.62 KG/M2 | HEIGHT: 63 IN | RESPIRATION RATE: 12 BRPM

## 2022-01-24 DIAGNOSIS — N91.2 FEMALE ATHLETE TRIAD SYNDROME: ICD-10-CM

## 2022-01-24 DIAGNOSIS — M81.8 FEMALE ATHLETE TRIAD SYNDROME: ICD-10-CM

## 2022-01-24 DIAGNOSIS — M76.61 ACHILLES TENDINITIS OF RIGHT LOWER EXTREMITY: Primary | ICD-10-CM

## 2022-01-24 DIAGNOSIS — F50.9 FEMALE ATHLETE TRIAD SYNDROME: ICD-10-CM

## 2022-01-24 DIAGNOSIS — M25.571 ACUTE RIGHT ANKLE PAIN: ICD-10-CM

## 2022-01-24 PROCEDURE — 97112 NEUROMUSCULAR REEDUCATION: CPT

## 2022-01-24 PROCEDURE — 99214 OFFICE O/P EST MOD 30 MIN: CPT | Performed by: ORTHOPAEDIC SURGERY

## 2022-01-24 PROCEDURE — 97110 THERAPEUTIC EXERCISES: CPT

## 2022-01-24 PROCEDURE — 97140 MANUAL THERAPY 1/> REGIONS: CPT

## 2022-01-24 NOTE — PROGRESS NOTES
[address]ra [address]royo [address] [facility]  Office Visit    Chief Complaint    Ankle Pain (OP/NP, Right achilles tendon. Notes having swelling/pain since mid December 2021. Notes having previous pain with achilles but denies any specific JASPER.  )      History of Present Illness:  Robin Reyes is a 22 y.o. female who presents for follow-up of left ankle sprain, and new evaluation of right ankle pain for the past 4-6 weeks. 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 She had noted progressive onset of right heel pain and swelling for the past 4 to 6 weeks. She had just taken part in the [employer] and found that the performances were quite taxing. She first noted an unusual sliding/crepitation feeling to her achilles insertion territory. She is noting subjective pain at the insertion of the Achilles with prolonged performances which is worse with change of direction and walking on her heels. She is apply topical Voltaren to some good effect. Has been doing physical therapy at the [place] office with [facility]. She denies any mechanical symptoms or giving way to the right ankle. Her left foot and ankle which I saw in the month of July has been doing well and at the time this was consistent with a stress injury to her fifth metatarsal base but did not require further imaging. On further history, Chay Chavez  does note some subjective anorexia, possible loss of appetite, low energy levels, fatigue, stress and anxiety. She notes regular periods some heavier than others but no loss of periods. She is worried about recurrent injuries and possible poor healing potential, and wondered if it was overall related to her energy levels and nutritional status. Her family is from [facility]. She lives in [address] [place] [address]. Separately she keeps a second job at [employer]. She spends many hours a week on her feet.      Pain Assessment  Location of Pain: Ankle  Location Modifiers: Right  Severity of Pain: 4  Quality of Pain: Dull,Aching,Sharp  Duration of Pain: Persistent  Frequency of Pain: Constant  Aggravating Factors: Other (Comment),Exercise,Bending,Stretching,Straightening (Changing directions; Ankle flexion and extension motions)  Limiting Behavior: Yes  Relieving Factors: Rest  Result of Injury: No  Work-Related Injury: No  Are there other pain locations you wish to document?: No    Past Medical History:   Diagnosis Date    Sprain of foot, left 2019        Past Surgical History:   Procedure Laterality Date    WISDOM TOOTH EXTRACTION         Family History   Problem Relation Age of Onset    Other Maternal Grandfather        Social History     Socioeconomic History    Marital status: Single     Spouse name: None    Number of children: None    Years of education: None    Highest education level: None   Occupational History    None   Tobacco Use    Smoking status: Never Smoker    Smokeless tobacco: Never Used   Substance and Sexual Activity    Alcohol use: Yes     Comment: Rarely    Drug use: Yes     Types: Marijuana (Weed)     Comment: few times per wk for anx and insomnia    Sexual activity: None   Other Topics Concern    None   Social History Narrative    None     Social Determinants of Health     Financial Resource Strain:     Difficulty of Paying Living Expenses: Not on file   Food Insecurity:     Worried About Running Out of Food in the Last Year: Not on file    Nigel of Food in the Last Year: Not on file   Transportation Needs:     Lack of Transportation (Medical): Not on file    Lack of Transportation (Non-Medical):  Not on file   Physical Activity:     Days of Exercise per Week: Not on file    Minutes of Exercise per Session: Not on file   Stress:     Feeling of Stress : Not on file   Social Connections:     Frequency of Communication with Friends and Family: Not on file    Frequency of Social Gatherings with Friends and Family: Not on file    Attends Rastafari Services: Not on file    Active Member of Clubs or Organizations: Not on file    Attends Club or Organization Meetings: Not on file    Marital Status: Not on file   Intimate Partner Violence:     Fear of Current or Ex-Partner: Not on file    Emotionally Abused: Not on file    Physically Abused: Not on file    Sexually Abused: Not on file   Housing Stability:     Unable to Pay for Housing in the Last Year: Not on file    Number of [place] in the Last Year: Not on file    Unstable Housing in the Last Year: Not on file       Current Outpatient Medications   Medication Sig Dispense Refill    dexamethasone (DECADRON) 4 MG/ML injection Apply 1.5cc to pad during physical therapy visit 30 mL 0     No current facility-administered medications for this visit. No Known Allergies      Review of Systems:  A 14 point review of systems available in the scanned medical record as documented by the patient. Today's review pertinent items are noted in HPI. Vital Signs:   Resp 12   Ht 5' 3\" (1.6 m)   Wt 122 lb (55.3 kg)   BMI 21.61 kg/m²     General Exam:    Neuro: Alert & Oriented x 3,  normal,  no focal deficits noted. Normal mood & affect. Eyes: Sclera clear  Ears: Normal external ear  Mouth:  No perioral lesions  Pulm: Respirations unlabored and regular   Skin: Warm, well perfused      Ankle exam:  Right  Inspection: No evidence of swelling erythema or cellulitis around the ankle. No evidence of bruising. No pes planus/valgus. Range of motion: -5 to 45 degrees of plantarflexion. 45 degrees of inversion associated with no pain, 25 degrees of eversion. Mildly + Silverskjold test with no evidence of gastroc-achilles contracture. Resisted strength testin out of 5 strength in dorsiflexion, plantarflexion, inversion, and eversion. Palpation: no Tender along the anterolateral joint line nonanterolateral ligamentous complex.  Tender + around the Achilles insertion and at the muscle-tendon junction. Mild deltoid ligament tenderness. No bony tenderness. Stability test: Normal anterior drawer with solid endpoint. Neurovascular exam: Normal neuro vascular exam of the ankle and foot. Radiology:     3 View X-rays (AP latera and mortise) of both the RIGHT ankle were obtained and reviewed in office. Impression: no acute findings. Assessment: Patient is a 22 y.o. female right heel pain for the past 4 to 6 weeks consistent with a clinical diagnosis of insertional Achilles tendinitis, and mild noninsertional tendinopathy. Katie Murrell may also have  an element of female triad symptoms although she has no major dysmenorrhea but certainly has subjective anorexia and low energy levels. 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 Orders Placed This Encounter   Procedures    XR ANKLE RIGHT (MIN 3 VIEWS)     ROOM 1     Standing Status:   Future     Number of Occurrences:   1     Standing Expiration Date:   1/24/2023    Vitamin D 25 Hydroxy     Standing Status:   Future     Standing Expiration Date:   1/24/2023    CBC     Standing Status:   Future     Standing Expiration Date:   1/24/2023    TSH with Reflex     Standing Status:   Future     Standing Expiration Date:   1/27/1418    FOLLICLE STIMULATING HORMONE     Standing Status:   Future     Standing Expiration Date:   1/24/2023    LITHIUM LEVEL     Standing Status:   Future     Standing Expiration Date:   1/24/2023     Order Specific Question:   Time of Last Dose?      Answer:   N/A    Testosterone     Standing Status:   Future     Standing Expiration Date:   1/24/2023    PROLACTIN     Standing Status:   Future     Standing Expiration Date: 1/24/2023    DHEA     Standing Status:   Future     Standing Expiration Date:   1/24/2023    ALT     Standing Status:   Future     Standing Expiration Date:   1/24/2023    AST     Standing Status:   Future     Standing Expiration Date:   1/24/2023    PREALBUMIN     Standing Status:   Future     Standing Expiration Date:   1/24/2023    COMPREHENSIVE METABOLIC PANEL     Standing Status:   Future     Standing Expiration Date:   1/24/2023    IRON     Standing Status:   Future     Standing Expiration Date:   1/24/2023     Order Specific Question:   Is Patient Fasting? Answer:   N/a     Order Specific Question:   No of Hours? Answer:   N/a    Amb External Referral To Psychology     Referral Priority:   Routine     Referral Type:   Consult for Advice and Opinion     Referral Reason:   Specialty Services Required     Referred to Provider:   Megan Mercer     Requested Specialty:   Psychology     Number of Visits Requested:   [facility] Weight [facility]     Referral Priority:   Routine     Referral Type:   Eval and Treat     Referral Reason:   Specialty Services Required     Requested Specialty:   Bariatric Surgery     Number of Visits Requested:   1       Plan:  Pertinent imaging was reviewed. The etiology, natural history, and treatment options for the disorder were discussed. The roles of activity medication, antiinflammatories, injections, bracing, physical therapy, and surgical interventions were all described to the patient and questions were answered. We believe patient is a candidate for continued activity modification and formal PT towards the right ankle/calf and achilles complex, with emphasis on anti-inflammatory modalities (iontophoresis) and eccentric strengthening. No indication for a boot nor further imaging today.     For the female triad picture, we discussed a team-based approach including the help of a sports psychologist, nutritionist/dietitian, orthopaedic physician and the AT/PT team at the [facility]. We have sent her for extensive blood work today (thyroid studies, CBC, metabolic panel, liver function, prolactin, testoterone/DHEA, vitamin D), and will review the findings and refer her appropriately for treatment of any deficiencies. I will see Ambrosio Stinson back in 5 weeks for re-assessment. All the patient's questions were answered while in the clinic. The patient is understanding of all instructions and agrees with the plan. Approximately 45 minutes was spent on patient education and coordinating care. Follow up in: No follow-ups on file. Sincerely,    Dorina Joseph MD [facility]   [address] [place], [facility]  Email: [email or web address]  Office: [phone]    The encounter with Leah Erazoluly was carried out by myself, Dr Binta Olivo, who personally examined the patient and reviewed the plan. This dictation was performed with a verbal recognition program (DRAGON) and it was checked for errors. It is possible that there are still dictated errors within this office note. If so, please bring any errors to my attention for an addendum. All efforts were made to ensure that this office note is accurate.        01/24/22  12:04 PM

## 2022-01-26 ENCOUNTER — APPOINTMENT (OUTPATIENT)
Dept: PHYSICAL THERAPY | Age: 22
End: 2022-01-26
Payer: COMMERCIAL

## 2022-01-26 NOTE — FLOWSHEET NOTE
The [address] [place] [address],[address], [address]  Phone: [phone]  Fax [phone]      Physical Therapy Treatment Note/ Progress Report:         Date:  2022    Patient Name:  Robin Reyes    :  2000  MRN: [id number]  Restrictions/Precautions:    Medical/Treatment Diagnosis Information:  Diagnosis: S93.492 L ankle sprain, M76.61 R Achilles tendonitis  Treatment Diagnosis: M25.572 L ankle pain, M25.571 R ankle pain, M62.82 L LE weakness, M25.372 L ankle instability  Insurance/Certification information:  PT Insurance Information: BCBS, 20 visits, (PT covered 100%), auth req - AIM, telehealth allowed with $30 copay  Physician Information:  Referring Practitioner:  Dr. Linda Fischer   Has the plan of care been signed (Y/N):        [x]  Yes  [x]  No     Date of Patient follow up with Physician: next week      Is this a Progress Report:     []  Yes  [x]  No        If Yes:  Date Range for reporting period:  Beginning  1/3/22  Ending    Progress report will be due (10 Rx or 30 days whichever is less): 3/6/87      Recertification will be due (POC Duration  / 90 days whichever is less): 22      Visit # Insurance Allowable Auth Required   In-person  20 visits [x]  Yes - AIM []  No    Telehealth - Covered w/ $30 copay []  Yes []  No    Total        * Insurance approved 5 visits      Functional Scale: LEFS: 63/80 = 21% disability; Dance Functional Outcome Survey: 58/90 = 36% disability  Date assessed: 1/3/22       Number of Comorbidities:  [x]0     []1-2    []3+    Latex Allergy:  [x]NO      []YES  Preferred Language for Healthcare:   [x]English       []other:      Pain level:  L ankle 0-1/10, R ankle 1-3/10    SUBJECTIVE: Pt had visit with Dr. Linda Fischer earlier today. She felt this visit went very well. He agreed her L ankle seemed to be progressing well.  He was concerned that the R Achilles wasn't getting better and that lack of adequate nutrition was likely a factor dealing healing. He is providing her a referral to a sports psychologist as well as to a dietician that can specifically work with her for her eating disorder, female athlete triad and fueling needs as a dancer. He also suggested that he felt Voltaren gel and PT should be enough to get progress and no need for iontophoresis at this time. She is to see him back in 4 weeks if not any better. OBJECTIVE:    Observation: L ankle no inflammation, very mild tenderness in L ATF and no tenderness at the CF, talar mobility now WNL; R ankle progressed now to moderate tenderness R Achilles insertion and at mid-tendon and thickening of the tendon but no visibile inflammation at this point.     Test measurements:        ROM LEFT  EVAL RIGHT  EVAL Left  1/19/22 Right  1/19/22   Ankle PF  96 deg 100 deg 99 deg    Ankle DF (knee extended) 11 deg 10 deg     Ankle DF (knee flexed) 12 deg 15 deg     Ankle In 26 deg 32 deg 30 deg    Ankle Ev 12 deg 13 deg     Strength  LEFT  EVAL RIGHT  EVAL Left  1/19/22 Right  1/19/22   HIP Flexors 5/5 5/5     HIP Abductors 5- 5     HIP Ext 5 5     Hip ER 4 4 4/5 4/5   Ankle DF 5-/5 5/5     Ankle PF 4+ 5 pain at end range 5- 5-/5 pain   Ankle Inv 4+ mild pain 5- 5- no pain    Ankle EV 4+ mild pain 5 4+ no pain    Great toe flexion 5- 5     Great toe extension 5- 5     Lesser toes flexion 5- 5     Lesser toes extension 5-/5 5/5           RESTRICTIONS/PRECAUTIONS: none     Exercises/Interventions:     Therapeutic Ex (32068) Sets/Reps/ sec Notes/CUES   Ankle alphabet    Seated heel slides PF/DF    Seated releve w/ tennis ball    Seated DF    Side plank with clam    Dome     Isometric heel raise  LAQ machine full stack, 1/2 range; HEP   Bwd walking     S/L inversion & eversion    Heel raise    BAPS: DF/PF, inv/ev, CW/CCW    Tippy toe walk/ Heel walk    Lateral tippy toe walk    TM: bwd walking on incline    Heel raise up 2 down 1    Hedgehog releve: heel on hedge hog    Hedgehog releve: ball of feet on hog    Releve tband 4-way    Split squat with axe chops    Side stepping     BOSU squats 1x20    1/2 roller releves DL 2x15    G/S slant board stretch 2x30\" ea         Pt edu: ice cup at lunch and end of day, options for use of ionto patch with dexamethasone at home 5 min    Manual Intervention (76496) 23 min    Talar post glide, distal fib post glide, MWM DF assist stretch 1 min L    Distal fib post glide taping  leuko   Graston medial achilles, plantar fascia and calf, STM calf, cupping 22 min R    Ankle eversion support elastikon 1 stirrup, 1 heel lock   Padding made to disperse pressure of back at dance shoe at Achilles insertion    Kinesiotape Achilles assist R                    NMR re-education (79999)  CUES NEEDED   SLB airex in V     SLB kick other leg    SLB turn head    Releve balance on hedgehogs    Hedgehog squat weight shift to SLS    SLB 1/2 roller port de bra    SLB cup  on airex 2x6 R/L    Ball of feet on hedgehogs, paloff press     SLB airex port de bra 2x30\" L    SLB split squat, foot on airex 2x10ea Yellow med ball   Hedgehog DL releve ballance 2x3x10\"    Denisa SL hops balance landing 3\" 2x2 L         Therapeutic Activity (98475)     Pointe assessment for return to dance recommendations : at [facility] floor including double pirouette      V saute    changement    Sissone arabesque hold 3\"                   Ice cup R Achilles 10 min Performed indep   Game Ready  Declined bc going to dance class   Pulsed US: R Achilles,50%, 3.3MHz, 1.0 W/cm2         Therapeutic Exercise and NMR EXR  [x] (25026) Provided verbal/tactile cueing for activities related to strengthening, flexibility, endurance, ROM for improvements in LE, proximal hip, and core control with self care, mobility, lifting, ambulation.   [x] (87290) Provided verbal/tactile cueing for activities related to improving balance, coordination, kinesthetic sense, posture, motor skill, proprioception  to assist with LE, proximal hip, and core control in self care, mobility, lifting, ambulation and eccentric single leg control. NMR and Therapeutic Activities:    [] (18373 or 48615) Provided verbal/tactile cueing for activities related to improving balance, coordination, kinesthetic sense, posture, motor skill, proprioception and motor activation to allow for proper function of core, proximal hip and LE with self care and ADLs  [] (10579) Gait Re-education- Provided training and instruction to the patient for proper LE, core and proximal hip recruitment and positioning and eccentric body weight control with ambulation re-education including up and down stairs     Home Exercise Program:    [x] (86075) Reviewed/Progressed HEP activities related to strengthening, flexibility, endurance, ROM of core, proximal hip and LE for functional self-care, mobility, lifting and ambulation/stair navigation   [] (91745)Reviewed/Progressed HEP activities related to improving balance, coordination, kinesthetic sense, posture, motor skill, proprioception of core, proximal hip and LE for self care, mobility, lifting, and ambulation/stair navigation       Access Code: [email or web address]  URL: [email or web address]  Date: 01/14/2022  Prepared by: Nely Johansen    Exercises  Standing Heel Raise - 1 x daily - 7 x weekly - 2 sets - 10 reps  Standing Eccentric Heel Raise - 1 x daily - 7 x weekly - 3 sets - 10 reps  Standing Heel Raise with Band - 1 x daily - 7 x weekly - 2 sets - 10 reps  Single Leg Isometric Heel Raise at Wall - 1 x daily - 7 x weekly - 3 sets - 10 reps - 10 sec hold  Gastroc Stretch on Wall - 1 x daily - 7 x weekly - 3 sets - 1 reps - 30 sec hold  Soleus Stretch on Wall - 1 x daily - 7 x weekly - 3 sets - 10 reps - 30 sec hold  Single Leg Balance with Trunk Rotation - 1 x daily - 7 x weekly - 3 sets - 20 reps  Lower Quarter Reach Combination - 1 x daily - 7 x weekly - 3 sets - 5 reps    Manual Treatments: PROM / STM / Oscillations-Mobs:  G-I, II, III, IV (PA's, Inf., Post.)  [x] (76142) Provided manual therapy to mobilize LE, proximal hip and/or LS spine soft tissue/joints for the purpose of modulating pain, promoting relaxation,  increasing ROM, reducing/eliminating soft tissue swelling/inflammation/restriction, improving soft tissue extensibility and allowing for proper ROM for normal function with self care, mobility, lifting and ambulation. Modalities:     [] GAME READY (VASO)- for significant edema, swelling, pain control. Charges  Timed Code Treatment Minutes: 60 min   Total Treatment Minutes: 60 min       [] EVAL (LOW) 74329   [] EVAL (MOD) 16886  [] EVAL (HIGH) 75070   [] RE-EVAL     [x] UY(13916) x1   [] IONTO  [x] NMR (57884) x 1    [] VASO  [x] Manual (07798) x 2     [] Other:  [] TA x      [] Mech Traction (78869)  [] ES(attended) (92827)      [] ES (un) (08332):   Ultrasound x       GOALS:   Patient stated goal:get back to dancing full out w/o pain  [x]? Progressing: []? Met: []? Not Met: []? Adjusted     Therapist goals for Patient:   Short Term Goals: To be achieved in: 2 weeks  1. Independent in HEP and progression per patient tolerance, in order to prevent re-injury. []? Progressing: [x]? Met: []? Not Met: []? Adjusted  2. Patient will have a decrease in pain to facilitate improvement in movement, function, and ADLs as indicated by Functional Deficits. []? Progressing: []? Met: [x]? Not Met: []? Adjusted     Long Term Goals: To be achieved in: 8 weeks  1. Disability index score of 5% or less for the LEFS and 10% or less on the DFOS to assist with reaching prior level of function. []? Progressing: []? Met: [x]? Not Met: []? Adjusted  2. Patient will demonstrate increased AROM to WNL and symmetrical to other side without pain to allow for proper joint functioning as indicated by patients Functional Deficits. [x]? Progressing: []? Met: []? Not Met: []? Adjusted  3.  Patient will I am glad that she is going about her health in a holistic fashion with getting a sports psych and dietician referral and this too may help improve her metabolic well-being to allow normalization of healing time frames. Return to Play: (if applicable)   []  Stage 1: Intro to Strength   []  Stage 2: Return to Run and Strength   []  Stage 3: Return to Jump and Strength   []  Stage 4: Dynamic Strength and Agility   []  Stage 5: Sport Specific Training     []  Ready to Return to Play, Meets All Above Stages   []  Not Ready for Return to Sports   Comments:                             PLAN: See eval. Pt to be seen 2 times a week for 8 weeks. [x] Continue per plan of care [] Alter current plan (see comments above)  [] Plan of care initiated [] Hold pending MD visit [] Discharge      Electronically signed by:  Aaliyah Wiggins, PT, DPT, ATC, OCS [id number]    Note: If patient does not return for scheduled/ recommended follow up visits, this note will serve as a discharge from care along with most recent update on progress.

## 2022-01-28 ENCOUNTER — HOSPITAL ENCOUNTER (OUTPATIENT)
Dept: PHYSICAL THERAPY | Age: 22
Setting detail: THERAPIES SERIES
Discharge: HOME | End: 2022-01-28
Payer: COMMERCIAL

## 2022-01-28 PROCEDURE — 97112 NEUROMUSCULAR REEDUCATION: CPT

## 2022-01-28 PROCEDURE — 97140 MANUAL THERAPY 1/> REGIONS: CPT

## 2022-01-28 PROCEDURE — 97110 THERAPEUTIC EXERCISES: CPT

## 2022-01-28 NOTE — FLOWSHEET NOTE
The [address] [place] [address],[address], [address]  Phone: [phone]  Fax [phone]      Physical Therapy Treatment Note/ Progress Report:         Date:  2022    Patient Name:  Anan Dumont    :  2000  MRN: [id number]  Restrictions/Precautions:    Medical/Treatment Diagnosis Information:  Diagnosis: S93.492 L ankle sprain, M76.61 R Achilles tendonitis  Treatment Diagnosis: M25.572 L ankle pain, M25.571 R ankle pain, M62.82 L LE weakness, M25.372 L ankle instability  Insurance/Certification information:  PT Insurance Information: BCBS, 20 visits, (PT covered 100%), auth req - AIM, telehealth allowed with $30 copay  Physician Information:  Referring Practitioner:  Dr. Nelson Caldera   Has the plan of care been signed (Y/N):        [x]  Yes  [x]  No     Date of Patient follow up with Physician: next week      Is this a Progress Report:     []  Yes  [x]  No        If Yes:  Date Range for reporting period:  Beginning  1/3/22  Ending    Progress report will be due (10 Rx or 30 days whichever is less): 3/4/75      Recertification will be due (POC Duration  / 90 days whichever is less): 22      Visit # Insurance Allowable Auth Required   In-person  20 visits [x]  Yes - AIM []  No    Telehealth - Covered w/ $30 copay []  Yes []  No    Total        * Insurance approved 5 visits     * Aim approved 4 visits 22 - 22     Functional Scale: LEFS: 63/80 = 21% disability; Dance Functional Outcome Survey: 58/90 = 36% disability  Date assessed: 1/3/22       Number of Comorbidities:  [x]0     []1-2    []3+    Latex Allergy:  [x]NO      []YES  Preferred Language for Healthcare:   [x]English       []other:      Pain level:  L ankle 0-1/10, R ankle 1-3/10    SUBJECTIVE: Pt says she is feeling really tired and generally stressed right now. She did receive the referrals for both a sports psychologist and a dietician.  She is actually planning to schedule them right now while I am doing passive treatments to her calf (I did observe pt making at least one these appt on her phone.) Pt says the L ankle is feeling nearly 100% and dancing fully now. The R Achilles is perhaps slightly better than it was but still limits her with dancing due to the pain as the day goes on. She is participating fully but modifies where she can with releves and even just standing around. OBJECTIVE:    Observation: L ankle no inflammation, very mild tenderness in L ATF and no tenderness at the CF, talar mobility now WNL; R ankle progressed now to moderate tenderness R Achilles insertion and at mid-tendon and thickening of the tendon but no visibile inflammation at this point.      Test measurements:        ROM LEFT  EVAL RIGHT  EVAL Left  1/19/22 Right  1/19/22   Ankle PF  96 deg 100 deg 99 deg    Ankle DF (knee extended) 11 deg 10 deg     Ankle DF (knee flexed) 12 deg 15 deg     Ankle In 26 deg 32 deg 30 deg    Ankle Ev 12 deg 13 deg     Strength  LEFT  EVAL RIGHT  EVAL Left  1/19/22 Right  1/19/22   HIP Flexors 5/5 5/5     HIP Abductors 5- 5     HIP Ext 5 5     Hip ER 4 4 4/5 4/5   Ankle DF 5-/5 5/5     Ankle PF 4+ 5 pain at end range 5- 5-/5 pain   Ankle Inv 4+ mild pain 5- 5- no pain    Ankle EV 4+ mild pain 5 4+ no pain    Great toe flexion 5- 5     Great toe extension 5- 5     Lesser toes flexion 5- 5     Lesser toes extension 5-/5 5/5           RESTRICTIONS/PRECAUTIONS: none     Exercises/Interventions:     Therapeutic Ex (01797) Sets/Reps/ sec Notes/CUES   Ankle alphabet    Seated heel slides PF/DF    Seated releve w/ tennis ball    Seated DF    Side plank with clam    Dome     Isometric heel raise  LAQ machine full stack, 1/2 range; HEP   Bwd walking     S/L inversion & eversion    Heel raise    BAPS: DF/PF, inv/ev, CW/CCW    Tippy toe walk/ Heel walk    Lateral tippy toe walk    TM: bwd walking on incline    Heel raise up 2 down 1 3x10 L   Hedgehog releve: heel on hedge hog    Hedgehog releve: ball of feet on hog    Releve tband 4-way    Split squat with axe chops    Side stepping     BOSU squats    1/2 roller releves    G/S slant board stretch 3x30\" ea         Pt edu: ice cup at lunch and end of day, options for use of ionto patch with dexamethasone at home     Manual Intervention (86535) 26 min    Talar post glide, distal fib post glide, MWM DF assist stretch 2 min L    Distal fib post glide taping  leuko   Graston medial achilles, plantar fascia and calf, STM calf, cupping 22 min R    Ankle eversion support elastikon 1 stirrup, 1 heel lock   Padding made to disperse pressure of back at dance shoe at Achilles insertion    Kinesiotape Achilles assist R 2 min                   NMR re-education (11592)  CUES NEEDED   SLB airex in V     SLB kick other leg    SLB turn head    Releve balance on hedgehogs    Hedgehog squat weight shift to SLS 2x10   SLB 1/2 roller port de bra    SLB 1/2 FR LE ant/post reach 2x8 R/L    SLB cup  on airex 2x6 R/L    Ball of feet on hedgehogs, paloff press     SLB airex port de bra    SLB split squat, foot on airex Yellow med ball   Hedgehog DL releve ballance     Denisa SL hops balance landing 3\"          Therapeutic Activity (74308)     Pointe assessment for return to dance recommendations : at [address] [facility] floor including double pirouette      V saute    changement    Sissone arabesque hold 3\"                   Ice cup R Achilles  Performed indep   Game Ready  Declined bc going to dance class   Cont US: R Achilles,100%, 3.3MHz, 1.8 W/cm2 8 min        Therapeutic Exercise and NMR EXR  [x] (67853) Provided verbal/tactile cueing for activities related to strengthening, flexibility, endurance, ROM for improvements in LE, proximal hip, and core control with self care, mobility, lifting, ambulation.   [x] (34373) Provided verbal/tactile cueing for activities related to improving balance, coordination, kinesthetic sense, posture, motor skill, proprioception  to assist with LE, proximal hip, and core control in self care, mobility, lifting, ambulation and eccentric single leg control. NMR and Therapeutic Activities:    [] (23110 or 16883) Provided verbal/tactile cueing for activities related to improving balance, coordination, kinesthetic sense, posture, motor skill, proprioception and motor activation to allow for proper function of core, proximal hip and LE with self care and ADLs  [] (95724) Gait Re-education- Provided training and instruction to the patient for proper LE, core and proximal hip recruitment and positioning and eccentric body weight control with ambulation re-education including up and down stairs     Home Exercise Program:    [] (25070) Reviewed/Progressed HEP activities related to strengthening, flexibility, endurance, ROM of core, proximal hip and LE for functional self-care, mobility, lifting and ambulation/stair navigation   [] (80724)Reviewed/Progressed HEP activities related to improving balance, coordination, kinesthetic sense, posture, motor skill, proprioception of core, proximal hip and LE for self care, mobility, lifting, and ambulation/stair navigation       Access Code: [email or web address]  URL: [email or web address]  Date: 01/14/2022  Prepared by: Logan Marino    Exercises  Standing Heel Raise - 1 x daily - 7 x weekly - 2 sets - 10 reps  Standing Eccentric Heel Raise - 1 x daily - 7 x weekly - 3 sets - 10 reps  Standing Heel Raise with Band - 1 x daily - 7 x weekly - 2 sets - 10 reps  Single Leg Isometric Heel Raise at Wall - 1 x daily - 7 x weekly - 3 sets - 10 reps - 10 sec hold  Gastroc Stretch on Wall - 1 x daily - 7 x weekly - 3 sets - 1 reps - 30 sec hold  Soleus Stretch on Wall - 1 x daily - 7 x weekly - 3 sets - 10 reps - 30 sec hold  Single Leg Balance with Trunk Rotation - 1 x daily - 7 x weekly - 3 sets - 20 reps  Lower Quarter Reach Combination - 1 x daily - 7 x weekly - 3 sets - 5 reps    Manual Treatments:  PROM / STM / Oscillations-Mobs:  G-I, II, III, IV (PA's, Inf., Post.)  [x] (46457) Provided manual therapy to mobilize LE, proximal hip and/or LS spine soft tissue/joints for the purpose of modulating pain, promoting relaxation,  increasing ROM, reducing/eliminating soft tissue swelling/inflammation/restriction, improving soft tissue extensibility and allowing for proper ROM for normal function with self care, mobility, lifting and ambulation. Modalities:     [] GAME READY (VASO)- for significant edema, swelling, pain control. Cont ULTRASOUND to improve blood flow to tendon region and warm-up for activity. Charges  Timed Code Treatment Minutes: 60 min   Total Treatment Minutes: 60 min       [] EVAL (LOW) 12750   [] EVAL (MOD) 91391  [] EVAL (HIGH) 47768   [] RE-EVAL     [x] YZ(64431) x1   [] IONTO  [x] NMR (29525) x 1    [] VASO  [x] Manual (34155) x 2     [] Other:  [] TA x      [] Mech Traction (54871)  [] ES(attended) (40458)      [] ES (un) (05273):   Ultrasound x       GOALS:   Patient stated goal:get back to dancing full out w/o pain  [x]? Progressing: []? Met: []? Not Met: []? Adjusted     Therapist goals for Patient:   Short Term Goals: To be achieved in: 2 weeks  1. Independent in HEP and progression per patient tolerance, in order to prevent re-injury. []? Progressing: [x]? Met: []? Not Met: []? Adjusted  2. Patient will have a decrease in pain to facilitate improvement in movement, function, and ADLs as indicated by Functional Deficits. []? Progressing: []? Met: [x]? Not Met: []? Adjusted     Long Term Goals: To be achieved in: 8 weeks  1. Disability index score of 5% or less for the LEFS and 10% or less on the DFOS to assist with reaching prior level of function. []? Progressing: []? Met: [x]? Not Met: []? Adjusted  2.  Patient will demonstrate increased AROM to WNL and symmetrical to other side without pain to allow for proper joint functioning as indicated by patients Functional Deficits. [x]? Progressing: []? Met: []? Not Met: []? Adjusted  3. Patient will demonstrate an increase in Strength to good proximal hip strength and control, within 5lb HHD in LE to allow for proper functional mobility as indicated by patients Functional Deficits. [x]? Progressing: []? Met: []? Not Met: []? Adjusted  4. Patient will be able to perform 15 reps of single leg releve with good ankle alignment control, good hell height, and without pain to return to pointe work and plyometric progression. [x]? Progressing: on the L but not on the R []? Met: []? Not Met: []? Adjusted  5. Pt will be able to perform 3 hop cross over test within 75% of ipsilateral limb without pain to return to plyometric and agility participation. []? Progressing: []? Met: []? Not Met: []? Adjusted         Progression Towards Functional goals:  [x] Patient is progressing as expected towards functional goals listed. [x] Progression is slowed due to complexities listed. [] Progression has been slowed due to co-morbidities. [] Plan just implemented, too soon to assess goals progression  [] Other:         Overall Progression Towards Functional goals/ Treatment Progress Update:  [x] Patient is progressing as expected towards functional goals listed. [] Progression is slowed due to complexities/Impairments listed. [] Progression has been slowed due to co-morbidities.   [] Plan just implemented, too soon to assess goals progression <30days   [] Goals require adjustment due to lack of progress  [x] Patient is not progressing as expected and requires additional follow up with physician  [] Other    Prognosis for POC: [x] Good [] Fair  [] Poor      Patient requires continued skilled intervention: [x] Yes  [] No    Treatment/Activity Tolerance:  [x] Patient able to complete treatment  [] Patient limited by fatigue  [] Patient limited by pain    [] Patient limited by other medical complications  [] Other:     ASSESSMENT: Pt reports nearly pain free and \"less crunchy\" heel raise ability after the manual work today. She show good symmetry in limb with dynamic balance challenges and 5/5 strength without pain in the L ankle today. It is showing signs of readiness for D/C but still needs PT for her R Achilles. Return to Play: (if applicable)   []  Stage 1: Intro to Strength   []  Stage 2: Return to Run and Strength   []  Stage 3: Return to Jump and Strength   []  Stage 4: Dynamic Strength and Agility   []  Stage 5: Sport Specific Training     []  Ready to Return to Play, Meets All Above Stages   []  Not Ready for Return to Sports   Comments:                             PLAN: See eval. Pt to be seen 2 times a week for 8 weeks. [x] Continue per plan of care [] Alter current plan (see comments above)  [] Plan of care initiated [] Hold pending MD visit [] Discharge      Electronically signed by:  Samira Kohler, PT, DPT, ATC, OCS [id number]    Note: If patient does not return for scheduled/ recommended follow up visits, this note will serve as a discharge from care along with most recent update on progress.

## 2022-01-31 ENCOUNTER — HOSPITAL ENCOUNTER (OUTPATIENT)
Dept: PHYSICAL THERAPY | Age: 22
Setting detail: THERAPIES SERIES
Discharge: HOME | End: 2022-01-31
Payer: COMMERCIAL

## 2022-01-31 PROCEDURE — 97140 MANUAL THERAPY 1/> REGIONS: CPT

## 2022-01-31 PROCEDURE — 97035 APP MDLTY 1+ULTRASOUND EA 15: CPT

## 2022-01-31 NOTE — FLOWSHEET NOTE
[address], [address]  Phone: [phone]  Fax [phone]      Physical Therapy Treatment Note/ Progress Report:         Date:  2022    Patient Name:  Leah Echols    :  2000  MRN: [id number]  Restrictions/Precautions:    Medical/Treatment Diagnosis Information:  Diagnosis: S93.492 L ankle sprain, M76.61 R Achilles tendonitis  Treatment Diagnosis: M25.572 L ankle pain, M25.571 R ankle pain, M62.82 L LE weakness, M25.372 L ankle instability  Insurance/Certification information:  PT Insurance Information: BCBS, 20 visits, (PT covered 100%), auth req - AIM, telehealth allowed with $30 copay  Physician Information:  Referring Practitioner:  Dr. Binta Olivo   Has the plan of care been signed (Y/N):        [x]  Yes  [x]  No     Date of Patient follow up with Physician: next week      Is this a Progress Report:     []  Yes  [x]  No        If Yes:  Date Range for reporting period:  Beginning  1/3/22  Ending    Progress report will be due (10 Rx or 30 days whichever is less): 62      Recertification will be due (POC Duration  / 90 days whichever is less): 22      Visit # Insurance Allowable Auth Required   In-person ,   20 visits [x]  Yes - AIM []  No    Telehealth - Covered w/ $30 copay []  Yes []  No    Total        * Insurance approved 5 visits     * Aim approved 4 visits 22 - 22     Functional Scale: LEFS: 63/80 = 21% disability; Dance Functional Outcome Survey: 58/90 = 36% disability  Date assessed: 1/3/22       Number of Comorbidities:  [x]0     []1-2    []3+    Latex Allergy:  [x]NO      []YES  Preferred Language for Healthcare:   [x]English       []other:      Pain level:  L ankle 0-1/10, R ankle 1-3/10    SUBJECTIVE: Pt is having a very difficult mental health day.  She has reached out to her support network and does have a visit with her nutritionist and with psychologist this week but requests that we stay away from mirrors today. Pt reports the L ankle continues to feels fine and pretty much 100% her R Achilles is still painful and tight but not as bad as last week. OBJECTIVE:    Observation: L ankle no inflammation, no tenderness in L ATF and no tenderness at the CF, talar mobility now WNL; R ankle progressed now to moderate tenderness R Achilles insertion and at mid-tendon and thickening of the tendon but no visibile inflammation at this point.      Test measurements:        ROM LEFT  EVAL RIGHT  EVAL Left  1/19/22 Right  1/19/22   Ankle PF  96 deg 100 deg 99 deg    Ankle DF (knee extended) 11 deg 10 deg     Ankle DF (knee flexed) 12 deg 15 deg     Ankle In 26 deg 32 deg 30 deg    Ankle Ev 12 deg 13 deg     Strength  LEFT  EVAL RIGHT  EVAL Left  1/19/22 Right  1/19/22   HIP Flexors 5/5 5/5     HIP Abductors 5- 5     HIP Ext 5 5     Hip ER 4 4 4/5 4/5   Ankle DF 5-/5 5/5     Ankle PF 4+ 5 pain at end range 5- 5-/5 pain   Ankle Inv 4+ mild pain 5- 5- no pain    Ankle EV 4+ mild pain 5 4+ no pain    Great toe flexion 5- 5     Great toe extension 5- 5     Lesser toes flexion 5- 5     Lesser toes extension 5-/5 5/5           RESTRICTIONS/PRECAUTIONS: none     Exercises/Interventions:     Therapeutic Ex (90570) Sets/Reps/ sec Notes/CUES   Ankle alphabet    Seated heel slides PF/DF    Seated releve w/ tennis ball    Seated DF    Side plank with clam    Dome     Isometric heel raise  LAQ machine full stack, 1/2 range; HEP   Bwd walking     S/L inversion & eversion    Heel raise    BAPS: DF/PF, inv/ev, CW/CCW    Tippy toe walk/ Heel walk    Lateral tippy toe walk    TM: bwd walking on incline    Heel raise up 2 down 1    Hedgehog releve: heel on hedge hog    Hedgehog releve: ball of feet on hog    Releve tband 4-way    Split squat with axe chops    Side stepping     BOSU squats    1/2 roller releves    G/S slant board stretch 2x30\" ea         Pt edu: ice cup at lunch and end of day, options for use of ionto patch with dexamethasone at home     Manual Intervention (55995) 32 min    Talar post glide, distal fib post glide,  2 min R    Distal fib post glide taping  leuko   Graston medial achilles, plantar fascia and calf, STM calf,  27 min R    Ankle eversion support elastikon 1 stirrup, 1 heel lock   Padding made to disperse pressure of back at dance shoe at Achilles insertion    Kinesiotape Achilles assist R     Arch support taping 3 min elastikon w/ 1 lateral heel lock             NMR re-education (67708)  CUES NEEDED   SLB airex in V     SLB kick other leg    SLB turn head    Releve balance on hedgehogs    Hedgehog squat weight shift to SLS    SLB 1/2 roller port de bra    SLB 1/2 FR LE ant/post reach    SLB cup  on airex    Ball of feet on hedgehogs, paloff press     SLB airex port de bra    SLB split squat, foot on airex Yellow med ball   Hedgehog DL releve ballance     Denisa SL hops balance landing 3\"          Therapeutic Activity (11598)     Pointe assessment for return to dance recommendations : at [facility] floor including double pirouette      V saute    changement    Sissone arabesque hold 3\"                   Ice cup R Achilles  Performed indep   Game Ready  Declined bc going to dance class   Cont US: R Achilles,100%, 3.3MHz, 1.8-2.0 W/cm2 8 min        Therapeutic Exercise and NMR EXR  [] (02610) Provided verbal/tactile cueing for activities related to strengthening, flexibility, endurance, ROM for improvements in LE, proximal hip, and core control with self care, mobility, lifting, ambulation.  [] (03250) Provided verbal/tactile cueing for activities related to improving balance, coordination, kinesthetic sense, posture, motor skill, proprioception  to assist with LE, proximal hip, and core control in self care, mobility, lifting, ambulation and eccentric single leg control.      NMR and Therapeutic Activities:    [] (62897 or 28721) Provided verbal/tactile cueing for activities related to improving balance, coordination, kinesthetic sense, posture, motor skill, proprioception and motor activation to allow for proper function of core, proximal hip and LE with self care and ADLs  [] (93127) Gait Re-education- Provided training and instruction to the patient for proper LE, core and proximal hip recruitment and positioning and eccentric body weight control with ambulation re-education including up and down stairs     Home Exercise Program:    [] (55873) Reviewed/Progressed HEP activities related to strengthening, flexibility, endurance, ROM of core, proximal hip and LE for functional self-care, mobility, lifting and ambulation/stair navigation   [] (83255)Reviewed/Progressed HEP activities related to improving balance, coordination, kinesthetic sense, posture, motor skill, proprioception of core, proximal hip and LE for self care, mobility, lifting, and ambulation/stair navigation       Access Code: [email or web address]  URL: [email or web address]  Date: 01/14/2022  Prepared by: Dave Urbina    Exercises  Standing Heel Raise - 1 x daily - 7 x weekly - 2 sets - 10 reps  Standing Eccentric Heel Raise - 1 x daily - 7 x weekly - 3 sets - 10 reps  Standing Heel Raise with Band - 1 x daily - 7 x weekly - 2 sets - 10 reps  Single Leg Isometric Heel Raise at Wall - 1 x daily - 7 x weekly - 3 sets - 10 reps - 10 sec hold  Gastroc Stretch on Wall - 1 x daily - 7 x weekly - 3 sets - 1 reps - 30 sec hold  Soleus Stretch on Wall - 1 x daily - 7 x weekly - 3 sets - 10 reps - 30 sec hold  Single Leg Balance with Trunk Rotation - 1 x daily - 7 x weekly - 3 sets - 20 reps  Lower Quarter Reach Combination - 1 x daily - 7 x weekly - 3 sets - 5 reps    Manual Treatments:  PROM / STM / Oscillations-Mobs:  G-I, II, III, IV (PA's, Inf., Post.)  [x] (28162) Provided manual therapy to mobilize LE, proximal hip and/or LS spine soft tissue/joints for the purpose of modulating pain, promoting relaxation, increasing ROM, reducing/eliminating soft tissue swelling/inflammation/restriction, improving soft tissue extensibility and allowing for proper ROM for normal function with self care, mobility, lifting and ambulation. Modalities:     [] GAME READY (VASO)- for significant edema, swelling, pain control. Cont ULTRASOUND to improve blood flow to tendon region and warm-up for activity. Charges  Timed Code Treatment Minutes: 40 min   Total Treatment Minutes: 40 min       [] EVAL (LOW) 62694   [] EVAL (MOD) 19109  [] EVAL (HIGH) 36763   [] RE-EVAL     [x] BT(37584) x1   [] IONTO  [x] NMR (40189) x 1    [] VASO  [x] Manual (04903) x 2     [] Other:  [] TA x      [] Mech Traction (31082)  [] ES(attended) (87813)      [] ES (un) (61201):   Ultrasound x       GOALS:   Patient stated goal:get back to dancing full out w/o pain  [x]? Progressing: []? Met: []? Not Met: []? Adjusted     Therapist goals for Patient:   Short Term Goals: To be achieved in: 2 weeks  1. Independent in HEP and progression per patient tolerance, in order to prevent re-injury. []? Progressing: [x]? Met: []? Not Met: []? Adjusted  2. Patient will have a decrease in pain to facilitate improvement in movement, function, and ADLs as indicated by Functional Deficits. []? Progressing: []? Met: [x]? Not Met: []? Adjusted     Long Term Goals: To be achieved in: 8 weeks  1. Disability index score of 5% or less for the LEFS and 10% or less on the DFOS to assist with reaching prior level of function. []? Progressing: []? Met: [x]? Not Met: []? Adjusted  2. Patient will demonstrate increased AROM to WNL and symmetrical to other side without pain to allow for proper joint functioning as indicated by patients Functional Deficits. [x]? Progressing: []? Met: []? Not Met: []? Adjusted  3.  Patient will demonstrate an increase in Strength to good proximal hip strength and control, within 5lb HHD in LE to allow for proper functional mobility as indicated by patients Functional Deficits. [x]? Progressing: []? Met: []? Not Met: []? Adjusted  4. Patient will be able to perform 15 reps of single leg releve with good ankle alignment control, good hell height, and without pain to return to pointe work and plyometric progression. [x]? Progressing: on the L but not on the R []? Met: []? Not Met: []? Adjusted  5. Pt will be able to perform 3 hop cross over test within 75% of ipsilateral limb without pain to return to plyometric and agility participation. []? Progressing: []? Met: []? Not Met: []? Adjusted         Progression Towards Functional goals:  [x] Patient is progressing as expected towards functional goals listed. [x] Progression is slowed due to complexities listed. [] Progression has been slowed due to co-morbidities. [] Plan just implemented, too soon to assess goals progression  [] Other:         Overall Progression Towards Functional goals/ Treatment Progress Update:  [x] Patient is progressing as expected towards functional goals listed. [] Progression is slowed due to complexities/Impairments listed. [] Progression has been slowed due to co-morbidities. [] Plan just implemented, too soon to assess goals progression <30days   [] Goals require adjustment due to lack of progress  [x] Patient is not progressing as expected and requires additional follow up with physician  [] Other    Prognosis for POC: [x] Good [] Fair  [] Poor      Patient requires continued skilled intervention: [x] Yes  [] No    Treatment/Activity Tolerance:  [x] Patient able to complete treatment  [] Patient limited by fatigue  [] Patient limited by pain    [] Patient limited by other medical complications  [] Other:     ASSESSMENT: Despite regular deep tissue STM and other manual techniques, stretching and pt rolling out on her own and ensuring her pointe shoes are not dead her R calf continues to be excessive tight and tender contributing to strain at her Achilles tendon. Return to Play: (if applicable)   []  Stage 1: Intro to Strength   []  Stage 2: Return to Run and Strength   []  Stage 3: Return to Jump and Strength   []  Stage 4: Dynamic Strength and Agility   []  Stage 5: Sport Specific Training     []  Ready to Return to Play, Meets All Above Stages   []  Not Ready for Return to Sports   Comments:                             PLAN: See eval. Pt to be seen 2 times a week for 8 weeks. [x] Continue per plan of care [] Alter current plan (see comments above)  [] Plan of care initiated [] Hold pending MD visit [] Discharge      Electronically signed by:  Ana Rosa Escalante, PT, DPT, ATC, OCS [id number]    Note: If patient does not return for scheduled/ recommended follow up visits, this note will serve as a discharge from care along with most recent update on progress.

## 2022-02-09 ENCOUNTER — TELEPHONE (OUTPATIENT)
Dept: ORTHOPEDIC SURGERY | Age: 22
End: 2022-02-09

## 2022-02-28 ENCOUNTER — HOSPITAL ENCOUNTER (OUTPATIENT)
Dept: PHYSICAL THERAPY | Age: 22
Setting detail: THERAPIES SERIES
Discharge: HOME | End: 2022-02-28

## 2022-02-28 NOTE — FLOWSHEET NOTE
[facility] and [address] [place] [address] Aaliyah  [address] [place], [place] [address]yn [address], [address]  Phone: [phone]  Fax [phone]    Physical Therapy  Cancellation/No-show Note  Patient Name:  Phong Calzada  :  2000   Date:  2022  Cancelled visits to date: 1  No-shows to date: 0    Patient status for today's appointment patient:  [x]  Cancelled  []  Rescheduled appointment  []  No-show     Reason given by patient:  []  Patient ill  []  Conflicting appointment  []  No transportation    []  Conflict with work  []  No reason given  [x]  Other: therapist had to cancel pt's visit due to still waiting to here back about authorization form her insurance and we have rescheduled for her later this week.      Comments:      Phone call information:   []  Phone call made today to patient at _ time at number provided:      []  Patient answered, conversation as follows:    []  Patient did not answer, message left as follows:  []  Phone call not made today    Electronically signed by:  Kayode Lechuga PT

## 2022-04-06 ENCOUNTER — OFFICE VISIT (OUTPATIENT)
Dept: ORTHOPEDIC SURGERY | Age: 22
End: 2022-04-06
Payer: COMMERCIAL

## 2022-04-06 VITALS — BODY MASS INDEX: 20.38 KG/M2 | HEIGHT: 63 IN | WEIGHT: 115 LBS

## 2022-04-06 DIAGNOSIS — R07.81 RIB PAIN ON LEFT SIDE: Primary | ICD-10-CM

## 2022-04-06 DIAGNOSIS — S29.012A STRAIN OF THORACIC BACK REGION: ICD-10-CM

## 2022-04-06 DIAGNOSIS — M54.6 ACUTE LEFT-SIDED THORACIC BACK PAIN: ICD-10-CM

## 2022-04-06 PROCEDURE — 99203 OFFICE O/P NEW LOW 30 MIN: CPT | Performed by: FAMILY MEDICINE

## 2022-04-06 RX ORDER — NAPROXEN 375 MG/1
375 TABLET ORAL 2 TIMES DAILY WITH MEALS
Qty: 60 TABLET | Refills: 3 | Status: SHIPPED | OUTPATIENT
Start: 2022-04-06 | End: 2022-09-08

## 2022-04-06 NOTE — LETTER
4/6/22    Harlan Quinteros  2000    Diagnosis: LEFT SIDED THORACIC STRAIN    Sport: dancing      Recommendations:          ____  No Restrictions:        ____  No Participation:          ___X_  Other Restrictions: OK FOR DANCE BASED ON PAIN      Return for Further Care: Yes    Follow up with ATC:  Yes               Amarilys Burk MD
Patent

## 2022-04-06 NOTE — PROGRESS NOTES
Chief Complaint    Chest Pain (CHEST PAIN - LEFT SIDED RIB PAIN) and Back Pain (POSTERIOR THORACIC PAIN)      Initial consultation ongoing left-sided lower thoracic and posterior chest wall pain      History of Present Illness:  Garima Alvarado is a 22 y.o. female who is a very pleasant white female originally from [place] and is in the second company for [employer] who does dance on a daily basis who is a patient of Dr. Patience Suarez who is being seen today upon referral from her trainers at the [facility] for evaluation of a thoracic injury. She states that in early March 2022, she was being lifted repetitively by her partner and began notes increasing pain and soreness to the posterior left chest wall and thoracic region. There is no pop or crack or definitive history of trauma or fall prior to becoming symptomatic. Initially it was fairly minor but it did worsen particular if she has been lifted or if she coughs and sneezes. She finally went and saw the trainers he did try a couple of sessions of rehabilitation without improvement of her symptoms. She is not truly complaining of shortness of breath or acute respiratory symptoms and really is not having a great deal of pain with leaps and turns as well as jumps and mostly it is a pain issue with pain being about 5-6 out of 10 when she is actively lifted. Her training schedule following this weekend and will be much easier with regard to lifts and has been trying to perform her home-based exercise program.  There was a history of eating disorder which she is being treated for at [facility].  She has not had imaging nor she modified her dance currently. She is being seen today for orthopedic and sports consultation with initial imaging.       Pain Assessment  Location of Pain: Rib cage  Location Modifiers: Left  Severity of Pain: 6  Quality of Pain: Aching,Sharp,Throbbing (VERY SITUATIONAL - TWISTS/TURNS MAKE IT WORSE)  Duration of Pain: Persistent  Frequency of Pain: Constant  Aggravating Factors: Straightening,Stretching,Bending,Exercise  Limiting Behavior: Yes  Relieving Factors: Rest  Result of Injury: Yes  Work-Related Injury: No  Are there other pain locations you wish to document?: No      Medical History  Patient's medications, allergies, past medical, surgical, social and family histories were reviewed and updated as appropriate. Review of Systems  Relevant review of systems reviewed on 4/6/2022 and available in the patient's chart under the medial tab. Vital Signs    There were no vitals filed for this visit. General Exam:   Constitutional: Patient is adequately groomed with no evidence of malnutrition  DTRs: Deep tendon reflexes are intact  Mental Status: The patient is oriented to time, place and person. The patient's mood and affect are appropriate. Lymphatic: The lymphatic examination bilaterally reveals all areas to be without enlargement or induration. Vascular: Examination reveals no swelling or calf tenderness. Peripheral pulses are palpable and 2+. Neurological: The patient has good coordination. There is no weakness or sensory deficit. Thoracic Spine Examination    Inspection: There is no high-grade deformity or substantial soft tissue swelling. No scapular winging or spasm. Palpation: She does have clinical tenderness primarily over the thoracic paraspinals from T11 to roughly T8. She does have some mild inferior rhomboid tenderness with some intercostal tenderness. No step-offs to the ribs posteriorly as well on the left. Rang of Motion: She does have reasonable chest wall motion and excursion. Strength: No evidence of upper or lower extremity strength deficits. Special Tests: Negative special testing. Skin: There are no rashes, ulcerations or lesions. Distal motor sensory and vascular exams intact. Gait: Fluid smooth gait.     Reflexes:  Symmetrically preserved Additional Comments:        Additional Examinations:     Right Lower Extremity: Examination of the right lower extremity does not show any tenderness, deformity or injury. Range of motion is unremarkable. There is no gross instability. There are no rashes, ulcerations or lesions. Strength and tone are normal.  Left Lower Extremity: Examination of the left lower extremity does not show any tenderness, deformity or injury. Range of motion is unremarkable. There is no gross instability. There are no rashes, ulcerations or lesions. Strength and tone are normal.  Neck: Examination of the neck does not show any tenderness, deformity or injury. Range of motion is unremarkable. There is no gross instability. There are no rashes, ulcerations or lesions. Strength and tone are normal.              Diagnostic Test Findings:   AP and lateral thoracic spine films were obtained today and does not show evidence of acute osseous injury. She does have some straightening of the thoracic spine with minimal scoliosis changes which could be positional.  Left-sided ribs were also imaged and there is no evidence of acute fracture or obvious signs of stress injury. Assessment: #1.  4-week status post likely symptomatic unrehabilitated left side thoracic strain with ongoing thoracic pain with lifts in ballet. Impression:     Encounter Diagnoses   Name Primary?     Rib pain on left side Yes    Acute left-sided thoracic back pain     Strain of thoracic back region        Office Procedures:     Orders Placed This Encounter   Procedures    XR THORACIC SPINE (2 VIEWS)     Standing Status:   Future     Number of Occurrences:   1     Standing Expiration Date:   4/6/2023    XR RIBS LEFT (2 VIEWS)     Standing Status:   Future     Number of Occurrences:   1     Standing Expiration Date:   4/6/2023   [facility] Physical Therapy - Gwendolyn Garcia (Ortho & Sports)-[facility]     Referral Priority:   Routine     Referral Type:   Eval and Treat Referral Reason:   Specialty Services Required     Requested Specialty:   Physical Therapy     Number of Visits Requested:   1       Treatment Plan:  Treatment options were discussed with Darya Santana. We did review her current plain films and exam findings. She has been symptomatic for the past month with pain to the posterior chest wall and thoracic region which started with repetitively practicing lifting maneuvers. She has fully been dancing and really does not exhibit much in the way of pain with jumps and turns. It is mostly when she is actively lifting and does feel very tight. We did discuss further work-up with imaging versus bone scanning to evaluate for underlying osseous injury or rib stress fracture however she has had little in the way of treatment. Following discussion of options that she is on her parents insurance still, we will treat her with dedicated physical therapy at the [facility] with manual techniques and will start her on Naprosyn 375 mg 1 pill twice daily. I am okay with her dancing and apparently following this weekend her lift requirements will be substantially diminished. We will see her back in about 3 weeks to see how she is progressing and consider imaging in the form of MRI or bone scanning if she is failing to improve. Icing and activity modification importance performing exercise program was discussed. They will contact us with questions or concerns. This dictation was performed with a verbal recognition program (DRAGON) and it was checked for errors. It is possible that there are still dictated errors within this office note. If so, please bring any errors to my attention for an addendum. All efforts were made to ensure that this office note is accurate.

## 2022-04-07 ENCOUNTER — TELEPHONE (OUTPATIENT)
Dept: FAMILY MEDICINE CLINIC | Age: 22
End: 2022-04-07

## 2022-04-07 NOTE — TELEPHONE ENCOUNTER
Pls call Luis Valles and let her know that [facility] ) asked that we call her to set up a follow up appt. Pls offer Wed 4/20 at 2 or 2:30.  Thx.

## 2022-04-07 NOTE — TELEPHONE ENCOUNTER
----- Message from Ashok Campbell ATC sent at 4/7/2022  2:37 PM EDT -----  Regarding: RE: Yousuf Aviles in  [facility]- Please have Bhumi contact her to schedule. Janki Liao   ----- Message -----  From: Vika Triana MD  Sent: 4/6/2022  12:41 PM EDT  To: Ashok Campbell ATC  Subject: RE: Yousuf Aviles in                              [place],  I'm happy to see Macrina again. Please let me know if you'd like Rekha Christina to contact her to arrange the visit. Thanks for your update.  ----- Message -----  From: Ashok Campbell ATC  Sent: 4/6/2022  11:23 AM EDT  To: Vika Triana MD  Subject: Yousuf Aviles in                                  Hi Dr. Gallo Rodriguez to have Macrina follow up with you since it has been about 6 months since her initial visit with you. As you can see in her chart, she has been receiving care for both her eating disorder and depression/anxiety. Things have slowly been getting better for her but she continues to struggle with energy availability and with no surprise has been sustaining injuries at an alarming rate this season. Wondering if you could take a closer look at labs and bone density with her to determine if there are any concern for female athlete triad.       Thanks as always,     Janki Liao

## 2022-04-11 ENCOUNTER — HOSPITAL ENCOUNTER (OUTPATIENT)
Dept: PHYSICAL THERAPY | Age: 22
Setting detail: THERAPIES SERIES
Discharge: HOME | End: 2022-04-11
Payer: COMMERCIAL

## 2022-04-11 PROCEDURE — 97161 PT EVAL LOW COMPLEX 20 MIN: CPT

## 2022-04-11 PROCEDURE — 97140 MANUAL THERAPY 1/> REGIONS: CPT

## 2022-04-11 PROCEDURE — 97110 THERAPEUTIC EXERCISES: CPT

## 2022-04-11 NOTE — FLOWSHEET NOTE
[address], [address]  Phone: [phone]  Fax [phone]    Physical Therapy Treatment Note/ Progress Report:           Date:  2022    Patient Name:  Ruddy Alicea    :  2000  MRN: [id number]  Restrictions/Precautions:    Medical/Treatment Diagnosis Information:  · Diagnosis: M54.6 L thoracic back pain, S29.012 strain of thoracic back  · Treatment Diagnosis: thoracic pain M54.6, M25.619 thoracic stiffness, R07.1 pain with breathing  Insurance/Certification information:  PT Insurance Information: [address], 20 visits, auth needed - AIM  Physician Information:  Referring Practitioner: Dr. Delisa Delgado  Has the plan of care been signed (Y/N):        []  Yes  [x]  No     Date of Patient follow up with Physician: as needed      Is this a Progress Report:     []  Yes  [x]  No        If Yes:  Date Range for reporting period:  Beginning 22  Ending    Progress report will be due (10 Rx or 30 days whichever is less):       Recertification will be due (POC Duration  / 90 days whichever is less): 22       Visit # Insurance Allowable Auth Required   In-person 1/? 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 = 93% ability, 7% disability       Date assessed: 22        Number of Comorbidities:  []0     [x]1-2    []3+    Latex Allergy:  [x]NO      []YES  Preferred Language for Healthcare:   [x]English       []other:      Pain level:  2/10, 2-8/10    SUBJECTIVE:  See eval    OBJECTIVE: See eval   Observation:    Test measurements:      RESTRICTIONS/PRECAUTIONS: concerned about bone density    Exercises/Interventions:     Therapeutic Ex (43506) Sets/Reps/sec Notes/CUES   Self mobilization: mid-thoracic R rotation with extension 3x10 cavitation Pt edu: HEP, modifications, heating and taping options 4 min    Manual Intervention (76561) Total 16 min    Thoracolumbar roll (mid-thoracic) R rotation gd IV 5 min Left sidelying   Seated mid-thoracic distraction/ext/R rot  gd IV-V 2 min cavitation   Prone T7 R rotation P-A  2 min    L rib 7 facet P-A 2 min    STM: thoracic parasinals, L mid & low trap 5 min         NMR re-education (32151)  CUES NEEDED                                                Therapeutic Activity (08795)                              MHP 12 min        Therapeutic Exercise and NMR EXR  [x] (11999) Provided verbal/tactile cueing for activities related to strengthening, flexibility, endurance, ROM  for improvements in proximal hip and core control with self care, mobility, lifting and ambulation.  [] (82006) Provided verbal/tactile cueing for activities related to improving balance, coordination, kinesthetic sense, posture, motor skill, proprioception  to assist with core control in self care, mobility, lifting, and ambulation.      Therapeutic Activities:    [] (71536 or 03660) Provided verbal/tactile cueing for activities related to improving balance, coordination, kinesthetic sense, posture, motor skill, proprioception and motor activation to allow for proper function  with self care and ADLs  [] (67773) Provided training and instruction to the patient for proper core and proximal hip recruitment and positioning with ambulation re-education     Home Exercise Program:    [x] (23220) Reviewed/Progressed HEP activities related to strengthening, flexibility, endurance, ROM of core, proximal hip and LE for functional self-care, mobility, lifting and ambulation   [] (06777) Reviewed/Progressed HEP activities related to improving balance, coordination, kinesthetic sense, posture, motor skill, proprioception of core, proximal hip and LE for self care, mobility, lifting, and ambulation      Manual Treatments:  PROM / STM / Oscillations-Mobs:  G-I, II, III, IV (PA's, Inf., Post.)  [x] (48890) Provided manual therapy to mobilize proximal hip and LS spine soft tissue/joints for the purpose of modulating pain, promoting relaxation,  increasing ROM, reducing/eliminating soft tissue swelling/inflammation/restriction, improving soft tissue extensibility and allowing for proper ROM for normal function with self care, mobility, lifting and ambulation. Modalities:       Charges  Timed Code Treatment Minutes: 26   Total Treatment Minutes: 70     [x] EVAL (LOW) 48779   [] EVAL (MOD) 75081   [] EVAL (HIGH) 61129   [] RE-EVAL     [x] CJ(86475) x 1    [] IONTO  [] NMR (02734) x     [] VASO  [x] Manual (56938) x 1     [] Other:  [] TA x      [] Mech Traction (47420)  [] ES(attended) (69018)      [] ES (un) (02564):     Goals:   Patient stated goal: To reduce pain  []? Progressing: []? Met: []? Not Met: []? Adjusted     Therapist goals for Patient:   Short Term Goals: To be achieved in: 2 weeks  1. Independent in HEP and progression per patient tolerance, in order to prevent re-injury. []? Progressing: []? Met: []? Not Met: []? Adjusted  2. Patient will have a decrease in pain to facilitate improvement in movement, function, and ADLs as indicated by Functional Deficits. []? Progressing: []? Met: []? Not Met: []? Adjusted        Long Term Goals: To be achieved in: 6 weeks  1. Pt able to rotate through torso symmetrically bilaterally and WNL without increased pain or restriction. []? Progressing: []? Met: []? Not Met: []? Adjusted  2. Patient will be able to take short breaths, deep breaths and experience sneezing/coughing without increase pain or restriction. []? Progressing: []? Met: []? Not Met: []? Adjusted  3. Patient will demonstrate an increase in Strength to good proximal shoulder, scapular and core activation to allow for proper functional mobility as indicated by patients Functional Deficits. []? Progressing: []? Met: []? Not Met: []? Adjusted  4.  Patient will be able to push/pull without increased symptoms or restriction. []? Progressing: []? Met: []? Not Met: []? Adjusted  5. 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    []? Progressing: []? Met: []? Not Met: []? Adjusted         Progression Towards Functional goals:  [] Patient is progressing as expected towards functional goals listed. [] Progression is slowed due to complexities listed. [] Progression has been slowed due to co-morbidities. [x] Plan just implemented, too soon to assess goals progression  [] Other:     Overall Progression Towards Functional goals/ Treatment Progress Update:  [] Patient is progressing as expected towards functional goals listed. [] Progression is slowed due to complexities/Impairments listed. [] Progression has been slowed due to co-morbidities. [x] Plan just implemented, too soon to assess goals progression <30days   [] Goals require adjustment due to lack of progress  [] Patient is not progressing as expected and requires additional follow up with physician  [] Other    Prognosis for POC: [x] Good [] Fair  [] Poor      Patient requires continued skilled intervention: [x] Yes  [] No    Treatment/Activity Tolerance:  [x] Patient able to complete treatment  [] Patient limited by fatigue  [] Patient limited by pain    [] Patient limited by other medical complications  [] Other:     ASSESSMENT: Pt had improved pain free thoracic extension and improved AROM to 75% L rotation after treatment today.      Return to Play: (if applicable)   []  Stage 1: Intro to Strength   []  Stage 2: Return to Run and Strength   []  Stage 3: Return to Jump and Strength   []  Stage 4: Dynamic Strength and Agility   []  Stage 5: Sport Specific Training     []  Ready to Return to Play, Meets All Above Stages   []  Not Ready for Return to Sports   Comments:                               PLAN: See win. Pt to be seen 2 times a week for 6 weeks. [] Continue per plan of care [] Alter current plan (see comments above)  [x] Plan of care initiated [] Hold pending MD visit [] Discharge      Electronically signed by:  Yao Beard, PT, DPT, ATC, OCS [id number]    Note: If patient does not return for scheduled/ recommended follow up visits, this note will serve as a discharge from care along with most recent update on progress.

## 2022-04-11 NOTE — PLAN OF CARE
[facility] and [facility] [place]  [address] [place] [address], [address]  Phone: [phone]  Fax [phone]    Physical Therapy Certification    Dear Referring Practitioner: Dr. Tai Thomas,    We had the pleasure of evaluating the following patient for physical therapy services at [address] [place] [address]. A summary of our findings can be found in the initial assessment below. This includes our plan of care. If you have any questions or concerns regarding these findings, please do not hesitate to contact me at the office phone number checked above. Thank you for the referral.       Physician Signature:_______________________________Date:__________________  By signing above (or electronic signature), therapists plan is approved by physician    Patient: Heather Peoples   : 2000   MRN: [id number]  Referring Physician: Referring Practitioner: Dr. Tai Thomas      Evaluation Date: 2022      Medical Diagnosis Information:  Diagnosis: M54.6 L thoracic back pain, S29.012 strain of thoracic back   Treatment Diagnosis: thoracic pain M54.6, M25.619 thoracic stiffness, R07.1 pain with breathing                                         Insurance information: PT Insurance Information: [employer], 20 visits, auth needed - AIM       Precautions/ Contra-indications: none    C-SSRS Triggered by Intake questionnaire (Past 2 wk assessment):   [x] No, Questionnaire did not trigger screening.   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  At the time she was doing a lift with her partner and felt immediate pain and \"seize up\". She did not hear or feel a pop. She came to see the company ATC/PT and received some manual work and initially felt better but once started morning around again pain immediately returned. Since then been trying to stretch on her own, has seen more of the ATCs and received heat, e-stim, taping; and she has temporary relief but again once starts any activities including ascending stairs to get to dance studio the pain immediately return. Overall, during these last couple of weeks it has gotten worse. Pt saw Dr. Rafael Kitchen and he said that she has negative x-rays and dx a muscle strain and gave her Naproxen that has \"helped to the edge off a little. 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 She was in pain the whole time but she didn't let her stop her and it would take her extra time to get warmed up in order to be able to move enough to perform. At this time pt has pain and limitation with breathing shallow and deep breathing, sneezing, laying on her back or either side, laying on stomach seems to be okay, pushing door or grocery cart, picking up and carrying bags, can't turn to the left affecting driving, closing trunk of her [employer] car. With dancing pt has pain and limitation with breathing, cambre back (bend backwards), L leg large gestures back (battement, arabesque), partnering lifts and sometimes the release of the pressure to her rib cage is worse than the grab and pressure for the initial lift (but both hurt) pushing straight down and arm general motion without weight is okay, small jumps fine but the large leg gestures that accompany large jumps and painful and limited Bending forward is okay. Pt is in [employer] [address] [place] for upcoming [employer] show for [employer].      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     Living Status/Prior Level of Function: Independent with ADLs and IADLs, high level dancer    OBJECTIVE:     ROM LEFT RIGHT   Thoracic flex 100%     Thoracic extension 75% pain    Thoracic side bending 50% pain 90%   Thoracic rotation 25% pain 75% mild pain   LUMBAR FLEX 100%    LUMBAR %    Sidebend 80% 100%   Rotation 100% 100%    LEFT RIGHT   HIP Flex     HIP Abd     HIP ER     HIP IR     Knee Flex     Knee ext          Flexibility     Hamstring FLEX     Piriformis                Strength  LEFT RIGHT   Shoulder flexion 4+/5 4+/5   Shoulder abd 4+/5 mild pain 4+/5   Shoulder add 4+/5 mild pain 4+/5   Shoulder ER 4/5 4/5   Shoulder IR 4/5 5/5   Mid-trap/ Rhomboids 4+/5 pain 5-/5   Low trap 4-/5 pain 4/5   Upper trap 5/5    Serratus Anterior     MfA WNL WNL   TrA WNl WNL   HIP Flexors     HIP Abductors     HIP ER     Hip IR     Knee EXT (quad)     Knee Flex (HS)     Ankle DF     Ankle PF     Great Toe Ext       Reflexes/Sensation:    [x]Dermatomes/Myotomes intact    []UE Reflexes     []Normal []Hypo      []Hyper   []LE Reflexes     []Normal []Hypo      []Hyper   []Babinski/Clonus/Hoffmans:    []Other:    Joint mobility:   [x]Normal    []Hypo   []Hyper    Palpation: T4-12, T7 and T10, rotated to the left, T7 possible step-off. L T7 rib facet, general paraspinal spasm.      Functional Mobility/Transfers: WFL    Posture: reduced thoracic kyphosis and cervical lordosis    Bandages/Dressings/Incisions: none    Gait: (include devices/WB status) WNL    Orthopedic Special Tests: general soreness with rib compression, pain and muscle guarding with P-A particularly at T7-T10                       [x] Patient history, allergies, meds reviewed. Medical chart reviewed. See intake form. Review Of Systems (ROS):  [x]Performed Review of systems (Integumentary, CardioPulmonary, Neurological) by intake and observation. Intake form has been scanned into medical record. Patient has been instructed to contact their primary care physician regarding ROS issues if not already being addressed at this time.       Co-morbidities/Complexities (which will affect course of rehabilitation):   []None           Arthritic conditions   []Rheumatoid arthritis (M05.9)  []Osteoarthritis (M19.91)   Cardiovascular conditions   []Hypertension (I10)  []Hyperlipidemia (E78.5)  []Angina pectoris (I20)  []Atherosclerosis (I70)   Musculoskeletal conditions   []Disc pathology   []Congenital spine pathologies   []Prior surgical intervention  []Osteoporosis (M81.8)  []Osteopenia (M85.8)   Endocrine conditions   []Hypothyroid (E03.9)  []Hyperthyroid Gastrointestinal conditions   []Constipation (P05.22)   Metabolic conditions   []Morbid obesity (E66.01)  []Diabetes type 1(E10.65) or 2 (E11.65)   []Neuropathy (G60.9)     Pulmonary conditions   []Asthma (J45)  []Coughing   []COPD (J44.9)   Psychological Disorders  [x]Anxiety (F41.9)  [x]Depression (F32.9)   [x]Other: Eating Disorder   []Other:          Barriers to/and or personal factors that will affect rehab potential:              []Age  []Sex              []Motivation/Lack of Motivation                        [x]Co-Morbidities              []Cognitive Function, education/learning barriers              []Environmental, home barriers              []profession/work barriers  []past PT/medical experience  []other:  Justification: notably her eating disorder which is not controlled and in treatment for at this time give concern for her bone density and will reach out to her PCP to consider a bone density scan during to her bone tenderness and spine involvement. Falls Risk Assessment (30 days):   [x] Falls Risk assessed and no intervention required. 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 She now has pain limiting breathing, sleeping, bending backwards, twisting especially to the L and pushing through her arms however only shows a 7% disability on the DFOS as she admits to \"not letting her pain stop her\" but does worry she is making something worse. Pt demonstrates T4-10 mild hypo,obility with T7-T10 segments L rotated limiting her AROM in L side bend and L rotation and with extension. Her T7 left rib facet is also tender and shows some displacement. Pt also has general pain and spams throughout left> right paraspinal and scapulothoracic musculature as well as weakness and pain with muscle recruitment in several [facility] and [facility]. Pt will benefit from PT to address these impairments and improved quality of life, including breathing and sleeping but also a return to PLOF.      Functional Impairments:     [x]Noted thoracic hypomobility   []Noted lumbosacral and/or generalized hypermobility   [x]Decreased thoracic functional ROM   [x]Decreased core/proximal shoulder/scapular strength and neuromuscular control    [x]Decreased UE functional strength    []Abnormal reflexes/sensation/myotomal/dermatomal deficits  []Reduced balance/proprioceptive control    []other:      Functional Activity Limitations (from functional questionnaire and intake)   [x]Reduced ability to tolerate prolonged functional positions   [x]Reduced ability or difficulty with changes of positions or transfers between positions   []Reduced ability to maintain good posture and demonstrate good body mechanics with sitting, bending, and lifting   [x]Reduced ability to sleep   [] Reduced ability or tolerance with driving and/or computer work   [x]Reduced ability to perform lifting, reaching, carrying tasks   []Reduced ability to squat   []Reduced ability to forward bend   []Reduced ability to ambulate prolonged functional periods/distances/surfaces   []Reduced ability to ascend/descend stairs   []other:       Participation Restrictions   []Reduced participation in self care activities   [x]Reduced participation in home management activities   [x]Reduced participation in work activities   [x]Reduced participation in social activities. [x]Reduced participation in sport/recreation activities. Classification:   []Signs/symptoms consistent with Lumbar instability/stabilization subgroup. [x]Signs/symptoms consistent with thoracic mobilization/manipulation subgroup, myotomes and dermatomes intact. Meets manipulation criteria. []Signs/symptoms consistent with Lumbar direction specific/centralization subgroup   []Signs/symptoms consistent with Lumbar traction subgroup     [x]Signs/symptoms consistent with thoracic facet dysfunction   []Signs/symptoms consistent with lumbar stenosis type dysfunction   []Signs/symptoms consistent with nerve root involvement including myotome & dermatome dysfunction   []Signs/symptoms consistent with post-surgical status including: decreased ROM, strength and function.    []signs/symptoms consistent with pathology which may benefit from Dry needling     [x]other: thoracic strain/sprain    Prognosis/Rehab Potential:      []Excellent   [x]Good    []Fair   []Poor    Tolerance of evaluation/treatment:    []Excellent   [x]Good    []Fair   []Poor  Physical Therapy Evaluation Complexity Justification  [x] A history of present problem with:  [] no personal factors and/or comorbidities that impact the plan of care;  [x]1-2 personal factors and/or comorbidities that impact the plan of care  []3 personal factors and/or comorbidities that impact the plan of care  [x] An examination of body systems using standardized tests and measures addressing any of the following: body structures and functions (impairments), activity limitations, and/or participation restrictions;:  [x] a total of 1-2 or more elements   [] a total of 3 or more elements   [] a total of 4 or more elements   [x] A clinical presentation with:  [x] stable and/or uncomplicated characteristics   [] evolving clinical presentation with changing characteristics  [] unstable and unpredictable characteristics;   [x] Clinical decision making of [x] low, [] moderate, [] high complexity using standardized patient assessment instrument and/or measurable assessment of functional outcome. [x] EVAL (LOW) 73740 (typically 20 minutes face-to-face)  [] EVAL (MOD) 46205 (typically 30 minutes face-to-face)  [] EVAL (HIGH) 03861 (typically 45 minutes face-to-face)  [] RE-EVAL         PLAN: Pt to be seen 2 times a week for 6 weeks. May need to increase to 3 times a week if unable to maintain progress between sessions. Frequency/Duration:  2 days per week for 6 Weeks:  Interventions:  [x]  Therapeutic exercise including: strength training, ROM, for LE, Glutes and core   [x]  NMR activation and proprioception for glutes , LE and Core   [x]  Manual therapy as indicated for Shoulder complex, and spine to include: Dry Needling/IASTM, STM, PROM, Gr I-IV mobilizations, manipulation. [x]  Modalities as needed that may include: thermal agents, E-stim, Biofeedback, US, iontophoresis as indicated  [x]  Patient education on joint protection, postural re-education, activity modification, progression of HEP.     HEP instruction: Self mobilization of mid-thoracic R rotation & extension    GOALS:  Patient stated goal: To reduce pain  [] Progressing: [] Met: [] Not Met: [] Adjusted    Therapist goals for Patient:   Short Term Goals: To be achieved in: 2 weeks  1. Independent in HEP and progression per patient tolerance, in order to prevent re-injury. [] Progressing: [] Met: [] Not Met: [] Adjusted  2. Patient will have a decrease in pain to facilitate improvement in movement, function, and ADLs as indicated by Functional Deficits. [] Progressing: [] Met: [] Not Met: [] Adjusted      Long Term Goals: To be achieved in: 6 weeks  1. Pt able to rotate through torso symmetrically bilaterally and WNL without increased pain or restriction. [] Progressing: [] Met: [] Not Met: [] Adjusted  2. Patient will be able to take short breaths, deep breaths and experience sneezing/coughing without increase pain or restriction. [] Progressing: [] Met: [] Not Met: [] Adjusted  3. Patient will demonstrate an increase in Strength to good proximal shoulder, scapular and core activation to allow for proper functional mobility as indicated by patients Functional Deficits. [] Progressing: [] Met: [] Not Met: [] Adjusted  4. Patient will be able to push/pull without increased symptoms or restriction. [] Progressing: [] Met: [] Not Met: [] Adjusted  5. 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     [] Progressing: [] Met: [] Not Met: [] Adjusted       Electronically signed by:  Darwin Quinn, PT , DPT, ATC, OCS [id number]

## 2022-04-13 ENCOUNTER — APPOINTMENT (OUTPATIENT)
Dept: PHYSICAL THERAPY | Age: 22
End: 2022-04-13
Payer: COMMERCIAL

## 2022-04-15 ENCOUNTER — HOSPITAL ENCOUNTER (OUTPATIENT)
Dept: PHYSICAL THERAPY | Age: 22
Setting detail: THERAPIES SERIES
Discharge: HOME | End: 2022-04-15
Payer: COMMERCIAL

## 2022-04-15 PROCEDURE — 97112 NEUROMUSCULAR REEDUCATION: CPT

## 2022-04-15 PROCEDURE — 97140 MANUAL THERAPY 1/> REGIONS: CPT

## 2022-04-15 PROCEDURE — 97110 THERAPEUTIC EXERCISES: CPT

## 2022-04-15 NOTE — FLOWSHEET NOTE
The [address], [address]  Phone: [phone]  Fax [phone]    Physical Therapy Treatment Note/ Progress Report:           Date:  4/15/2022    Patient Name:  Waldo Schlatter    :  2000  MRN: [id number]  Restrictions/Precautions:    Medical/Treatment Diagnosis Information:  · Diagnosis: M54.6 L thoracic back pain, S29.012 strain of thoracic back  · Treatment Diagnosis: thoracic pain M54.6, M25.619 thoracic stiffness, R07.1 pain with breathing  Insurance/Certification information:  PT Insurance Information: Manfred Begun, 20 visits, auth needed - AIM  Physician Information:  Referring Practitioner: Dr. Hermes Khalil  Has the plan of care been signed (Y/N):        []  Yes  [x]  No     Date of Patient follow up with Physician: as needed      Is this a Progress Report:     []  Yes  [x]  No        If Yes:  Date Range for reporting period:  Beginning 22  Ending    Progress report will be due (10 Rx or 30 days whichever is less): 44      Recertification will be due (POC Duration  / 90 days whichever is less): 22       Visit # Insurance Allowable Auth Required   In-person 2/* 20 visits (8 used) [x]  Yes - AIM []  No    Telehealth - - []  Yes []  No    Total      * 5 visits approved 22 - 6/10/22      Functional Scale: Dancer functional Outcome Survey: 84/90 = 93% ability, 7% disability       Date assessed: 22        Number of Comorbidities:  []0     [x]1-2    []3+    Latex Allergy:  [x]NO      []YES  Preferred Language for Healthcare:   [x]English       []other:      Pain level:  /10, 2-8/10    SUBJECTIVE:  Pt reports she is feeling pretty sore today from rehearsals. She reports feeling better after taking the [employer] class since she was able to go within her limitations and felt that the extension movements felt good.      OBJECTIVE: See eval   Observation:    Test measurements: mobility, lifting, and ambulation. Therapeutic Activities:    [] (99963 or 81147) Provided verbal/tactile cueing for activities related to improving balance, coordination, kinesthetic sense, posture, motor skill, proprioception and motor activation to allow for proper function  with self care and ADLs  [] (69690) Provided training and instruction to the patient for proper core and proximal hip recruitment and positioning with ambulation re-education     Home Exercise Program:    [x] (60901) Reviewed/Progressed HEP activities related to strengthening, flexibility, endurance, ROM of core, proximal hip and LE for functional self-care, mobility, lifting and ambulation   [] (39585) Reviewed/Progressed HEP activities related to improving balance, coordination, kinesthetic sense, posture, motor skill, proprioception of core, proximal hip and LE for self care, mobility, lifting, and ambulation      Manual Treatments:  PROM / STM / Oscillations-Mobs:  G-I, II, III, IV (PA's, Inf., Post.)  [x] (57941) Provided manual therapy to mobilize proximal hip and LS spine soft tissue/joints for the purpose of modulating pain, promoting relaxation,  increasing ROM, reducing/eliminating soft tissue swelling/inflammation/restriction, improving soft tissue extensibility and allowing for proper ROM for normal function with self care, mobility, lifting and ambulation. Modalities:   MHP with IFC x 10 min at end (throught AT services provided by [facility] therefore not billed through her insurancet)    Charges  Timed Code Treatment Minutes: 55   Total Treatment Minutes: 55     [] EVAL (LOW) 87811   [] EVAL (MOD) 35370   [] EVAL (HIGH) 15054   [] RE-EVAL     [x] KE(66616) x 1    [] IONTO  [x] NMR (11509) x 2     [] VASO  [x] Manual (29912) x 1     [] Other:  [] TA x      [] Mech Traction (46873)  [] ES(attended) (49340)      [] ES (un) (70797):     Goals:   Patient stated goal: To reduce pain  []? Progressing: []? Met: []?  Not Met: []? Adjusted     Therapist goals for Patient:   Short Term Goals: To be achieved in: 2 weeks  1. Independent in HEP and progression per patient tolerance, in order to prevent re-injury. []? Progressing: []? Met: []? Not Met: []? Adjusted  2. Patient will have a decrease in pain to facilitate improvement in movement, function, and ADLs as indicated by Functional Deficits. []? Progressing: []? Met: []? Not Met: []? Adjusted        Long Term Goals: To be achieved in: 6 weeks  1. Pt able to rotate through torso symmetrically bilaterally and WNL without increased pain or restriction. []? Progressing: []? Met: []? Not Met: []? Adjusted  2. Patient will be able to take short breaths, deep breaths and experience sneezing/coughing without increase pain or restriction. []? Progressing: []? Met: []? Not Met: []? Adjusted  3. Patient will demonstrate an increase in Strength to good proximal shoulder, scapular and core activation to allow for proper functional mobility as indicated by patients Functional Deficits. []? Progressing: []? Met: []? Not Met: []? Adjusted  4. Patient will be able to push/pull without increased symptoms or restriction. []? Progressing: []? Met: []? Not Met: []? Adjusted  5. 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    []? Progressing: []? Met: []? Not Met: []? Adjusted         Progression Towards Functional goals:  [] Patient is progressing as expected towards functional goals listed. [] Progression is slowed due to complexities listed. [] Progression has been slowed due to co-morbidities. [x] Plan just implemented, too soon to assess goals progression  [] Other:     Overall Progression Towards Functional goals/ Treatment Progress Update:  [] Patient is progressing as expected towards functional goals listed. [] Progression is slowed due to complexities/Impairments listed. [] Progression has been slowed due to co-morbidities. [x] Plan just implemented, too soon to assess goals progression <30days   [] Goals require adjustment due to lack of progress  [] Patient is not progressing as expected and requires additional follow up with physician  [] Other    Prognosis for POC: [x] Good [] Fair  [] Poor      Patient requires continued skilled intervention: [x] Yes  [] No    Treatment/Activity Tolerance:  [x] Patient able to complete treatment  [] Patient limited by fatigue  [] Patient limited by pain    [] Patient limited by other medical complications  [] Other:     ASSESSMENT:Pt demonstrates good rib and spinal positioning upon walking in today, although has hypomobility into extension and L rotation. Pt had improved pain free L thoracic rotation ROM after treatment. Added more mobility exercises into L rotation to HEP today to maintain mobility. Worked thoroughly today on breathing patterns, as she naturally uses chest expansion with limited B rib expansion (R>L). With extensive cuing she is able to use a diaphragmatic strategy and expand further into the posterior ribs. Went through [employer] and order of performing HEP in order to emphasize developing natural diaphragmatic breathing pattern. Return to Play: (if applicable)   []  Stage 1: Intro to Strength   []  Stage 2: Return to Run and Strength   []  Stage 3: Return to Jump and Strength   []  Stage 4: Dynamic Strength and Agility   []  Stage 5: Sport Specific Training     []  Ready to Return to Play, Meets All Above Stages   []  Not Ready for Return to Sports   Comments:                               PLAN: See eval. Pt to be seen 2 times a week for 6 weeks.    [] Continue per plan of care [] Alter current plan (see comments above)  [x] Plan of care initiated [] Hold pending MD visit [] Discharge      Electronically signed by:  Aaron Olivera, PT, DPT, ATC, OCS WILBER Todd. Therapist was present, directed the patient's care, made skilled judgement, and was responsible for assessment and treatment of the patient. Note: If patient does not return for scheduled/ recommended follow up visits, this note will serve as a discharge from care along with most recent update on progress. Access Code: [email or web address]  URL: [email or web address]/  Date: 04/15/2022  Prepared by: Jeffery Daily    Exercises  Prone Press Up - 1 x daily - 7 x weekly - 3 sets - 10 reps  Sidelying Open Book Thoracic Lumbar Rotation and Extension - 1 x daily - 7 x weekly - 3 sets - 10 reps  Quadruped Thoracic Rotation - Reach Under - 1 x daily - 7 x weekly - 3 sets - 10 reps  Supine Diaphragmatic Breathing - 1 x daily - 7 x weekly - 3 sets - 10 reps  Hooklying Rib Cage Breathing - 1 x daily - 7 x weekly - 3 sets - 10 reps  Supine Breathing with Caregiver Resistance at Ribcage - 1 x daily - 7 x weekly - 3 sets - 10 reps  Seated Diaphragmatic Breathing - 1 x daily - 7 x weekly - 3 sets - 10 reps  Winged Arm Breathing - 1 x daily - 7 x weekly - 3 sets - 10 reps

## 2022-04-18 ENCOUNTER — HOSPITAL ENCOUNTER (OUTPATIENT)
Dept: PHYSICAL THERAPY | Age: 22
Setting detail: THERAPIES SERIES
Discharge: HOME | End: 2022-04-18
Payer: COMMERCIAL

## 2022-04-18 PROCEDURE — 97140 MANUAL THERAPY 1/> REGIONS: CPT

## 2022-04-18 PROCEDURE — 97112 NEUROMUSCULAR REEDUCATION: CPT

## 2022-04-18 NOTE — FLOWSHEET NOTE
The [address] [place] [address],[address], [address]  Phone: [phone]  Fax [phone]    Physical Therapy Treatment Note/ Progress Report:           Date:  2022    Patient Name:  Catherine Mendiola    :  2000  MRN: [id number]  Restrictions/Precautions:    Medical/Treatment Diagnosis Information:  · Diagnosis: M54.6 L thoracic back pain, S29.012 strain of thoracic back  · Treatment Diagnosis: thoracic pain M54.6, M25.619 thoracic stiffness, R07.1 pain with breathing  Insurance/Certification information:  PT Insurance Information: Bernadette Jha, 20 visits, auth needed - AIM  Physician Information:  Referring Practitioner: Dr. Gutiérrez   Has the plan of care been signed (Y/N):        []  Yes  [x]  No     Date of Patient follow up with Physician: as needed      Is this a Progress Report:     []  Yes  [x]  No        If Yes:  Date Range for reporting period:  Beginning 22  Ending     Progress report will be due (10 Rx or 30 days whichever is less):       Recertification will be due (POC Duration  / 90 days whichever is less): 22       Visit # Insurance Allowable Auth Required   In-person 3* 20 visits (8 used) [x]  Yes - AIM []  No    Telehealth - - []  Yes []  No    Total      * 5 visits approved 22 - 6/10/22      Functional Scale: Dancer functional Outcome Survey: 84/90 = 93% ability, 7% disability       Date assessed: 22        Number of Comorbidities:  []0     [x]1-2    []3+    Latex Allergy:  [x]NO      []YES  Preferred Language for Healthcare:   [x]English       []other:      Pain level:  2/10, 2-8/10    SUBJECTIVE:  Pt reports she has been able to breathe better and feels like her rotation movement is better. She still is getting some sharp pains on the L side with certain movements.      OBJECTIVE:   Observation: spine and rib position normal; increased tension of L lower thoracic paraspinals which decreased with STM; Equal thoracic rotation R/L   Test measurements:      RESTRICTIONS/PRECAUTIONS: concerned about bone density    Exercises/Interventions:     Therapeutic Ex (89671) Sets/Reps/sec Notes/CUES   Self mobilization: mid-thoracic R rotation with extension  cavitation        Prone Press up into L rot    Sidelying Open books to L rot    Thread the needle into L rot                                       Pt edu: HEP walk through, Performing HEP in order Idea of making diaphragmatic breathing automatic and need for neuromuscular training to make it a habit 4 min    Manual Intervention (20404) Total 11 min    Thoracolumbar roll (mid-thoracic) R rotation  Left sidelying   Seated mid-thoracic distraction/ext/R rot   cavitation   Prone T5-T9 central PA     Prone T5-T8 R rotation P-A  2 min    Prone T5-T8 R uPA MWM into PPU 4 min    L rib 7 facet P-A     STM: thoracic parasinals,  5 min         NMR re-education (42546)  CUES NEEDED   Supine Diaphragmatic breathing series    Diaphragmatic breathing   Breathing with rib expansion RTB  Breathing with TA activation  Breathing with LE in 90/90        x15  x15  x15  2x15     Emphasis on diaphragm first, and filling posterior ribs   Seated Diaphragmatic breathing series    Diaphragmatic breathing  Breathing with rib expansion RTB  Breathing with no monies RTB          Emphasis on diaphragm first, and filling posterior ribs        Segmental Prone Press Up  Segmental Cat-Cow  Quadruped rockback   2x12  2x12  2x12 Emphasis on controlled segmental movement, cues for decreased rib flare        Paloff Press with breathing x5 R/L Breath out with press out, breath in with arms in                  Therapeutic Activity (90588)     Use of arm swing during gait NV?                             Therapeutic Exercise and NMR EXR  [] (01633) Provided verbal/tactile cueing for activities related to strengthening, flexibility, endurance, ROM  for improvements in proximal hip and core control with self care, mobility, lifting and ambulation. [x] (52170) Provided verbal/tactile cueing for activities related to improving balance, coordination, kinesthetic sense, posture, motor skill, proprioception  to assist with core control in self care, mobility, lifting, and ambulation. Therapeutic Activities:    [] (78789 or 72790) Provided verbal/tactile cueing for activities related to improving balance, coordination, kinesthetic sense, posture, motor skill, proprioception and motor activation to allow for proper function  with self care and ADLs  [] (50414) Provided training and instruction to the patient for proper core and proximal hip recruitment and positioning with ambulation re-education     Home Exercise Program:    [x] (64156) Reviewed/Progressed HEP activities related to strengthening, flexibility, endurance, ROM of core, proximal hip and LE for functional self-care, mobility, lifting and ambulation   [] (89783) Reviewed/Progressed HEP activities related to improving balance, coordination, kinesthetic sense, posture, motor skill, proprioception of core, proximal hip and LE for self care, mobility, lifting, and ambulation      Manual Treatments:  PROM / STM / Oscillations-Mobs:  G-I, II, III, IV (PA's, Inf., Post.)  [x] (83776) Provided manual therapy to mobilize proximal hip and LS spine soft tissue/joints for the purpose of modulating pain, promoting relaxation,  increasing ROM, reducing/eliminating soft tissue swelling/inflammation/restriction, improving soft tissue extensibility and allowing for proper ROM for normal function with self care, mobility, lifting and ambulation.      Modalities:       Charges  Timed Code Treatment Minutes: 43   Total Treatment Minutes: 43     [] EVAL (LOW) 07073   [] EVAL (MOD) 58186   [] EVAL (HIGH) 27026   [] RE-EVAL     [] DU(20155) x     [] IONTO  [x] NMR (40477) x 2     [] VASO  [x] Manual (13657) x 1     [] Other:  [] TA x      [] Mech Traction (17759)  [] ES(attended) (66400)      [] ES (un) (63051):     Goals:   Patient stated goal: To reduce pain  []? Progressing: []? Met: []? Not Met: []? Adjusted     Therapist goals for Patient:   Short Term Goals: To be achieved in: 2 weeks  1. Independent in HEP and progression per patient tolerance, in order to prevent re-injury. []? Progressing: []? Met: []? Not Met: []? Adjusted  2. Patient will have a decrease in pain to facilitate improvement in movement, function, and ADLs as indicated by Functional Deficits. []? Progressing: []? Met: []? Not Met: []? Adjusted        Long Term Goals: To be achieved in: 6 weeks  1. Pt able to rotate through torso symmetrically bilaterally and WNL without increased pain or restriction. []? Progressing: []? Met: []? Not Met: []? Adjusted  2. Patient will be able to take short breaths, deep breaths and experience sneezing/coughing without increase pain or restriction. []? Progressing: []? Met: []? Not Met: []? Adjusted  3. Patient will demonstrate an increase in Strength to good proximal shoulder, scapular and core activation to allow for proper functional mobility as indicated by patients Functional Deficits. []? Progressing: []? Met: []? Not Met: []? Adjusted  4. Patient will be able to push/pull without increased symptoms or restriction. []? Progressing: []? Met: []? Not Met: []? Adjusted  5. 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    []? Progressing: []? Met: []? Not Met: []? Adjusted         Progression Towards Functional goals:  [] Patient is progressing as expected towards functional goals listed. [] Progression is slowed due to complexities listed. [] Progression has been slowed due to co-morbidities.   [x] Plan just implemented, too soon to assess goals progression  [] Other:     Overall Progression Towards Functional goals/ Treatment Progress Update:  [] Patient is progressing as expected towards functional goals listed. [] Progression is slowed due to complexities/Impairments listed. [] Progression has been slowed due to co-morbidities. [x] Plan just implemented, too soon to assess goals progression <30days   [] Goals require adjustment due to lack of progress  [] Patient is not progressing as expected and requires additional follow up with physician  [] Other    Prognosis for POC: [x] Good [] Fair  [] Poor      Patient requires continued skilled intervention: [x] Yes  [] No    Treatment/Activity Tolerance:  [x] Patient able to complete treatment  [] Patient limited by fatigue  [] Patient limited by pain    [] Patient limited by other medical complications  [] Other:     ASSESSMENT: Pt demonstrates equal thoracic rotation R/L upon walking in today with good rib and spinal positioning. She has increased tension of the L lower thoracic paraspinals, which does decrease with STM today. Worked on segmental motion and dissociation of thoracic, lumbar, and pelvic motion. She requires cuing but does succeed when cued. Progressed breathing exercises in order to emphasize diaphragmatic breathing with movement. She does switch back to chest breathing at first, although is able to return to diaphragmatic breathing when focused on it. Return to Play: (if applicable)   []  Stage 1: Intro to Strength   []  Stage 2: Return to Run and Strength   []  Stage 3: Return to Jump and Strength   []  Stage 4: Dynamic Strength and Agility   []  Stage 5: Sport Specific Training     []  Ready to Return to Play, Meets All Above Stages   []  Not Ready for Return to Sports   Comments:                               PLAN: See eval. Pt to be seen 2 times a week for 6 weeks.    [] Continue per plan of care [] Alter current plan (see comments above)  [x] Plan of care initiated [] Hold pending MD visit [] Discharge      Electronically signed by:  Frankie Cox, PT, DPT, ATC, OCS [id number]  Sejal Echeverria, WILBER. Therapist was present, directed the patient's care, made skilled judgement, and was responsible for assessment and treatment of the patient. Note: If patient does not return for scheduled/ recommended follow up visits, this note will serve as a discharge from care along with most recent update on progress. Access Code: [email or web address]  URL: [email or web address]  Date: 04/15/2022  Prepared by: Frankie Cox    Exercises  Prone Press Up - 1 x daily - 7 x weekly - 3 sets - 10 reps  Sidelying Open Book Thoracic Lumbar Rotation and Extension - 1 x daily - 7 x weekly - 3 sets - 10 reps  Quadruped Thoracic Rotation - Reach Under - 1 x daily - 7 x weekly - 3 sets - 10 reps  Supine Diaphragmatic Breathing - 1 x daily - 7 x weekly - 3 sets - 10 reps  Hooklying Rib Cage Breathing - 1 x daily - 7 x weekly - 3 sets - 10 reps  Supine Breathing with Caregiver Resistance at Ribcage - 1 x daily - 7 x weekly - 3 sets - 10 reps  Seated Diaphragmatic Breathing - 1 x daily - 7 x weekly - 3 sets - 10 reps  Winged Arm Breathing - 1 x daily - 7 x weekly - 3 sets - 10 reps    Access Code: [email or web address]  URL: [email or web address]  Date: 04/18/2022  Prepared by: Frankie Cox    Exercises  Diaphragmatic Breathing in 90/90 - 1 x daily - 7 x weekly - 3 sets - 10 reps  Prone Press Up - 1 x daily - 7 x weekly - 3 sets - 10 reps  Cat-Camel - 1 x daily - 7 x weekly - 3 sets - 10 reps  Hip Hinge Rock Back - 1 x daily - 7 x weekly - 3 sets - 10 reps  Standing Anti-Rotation Press with Anchored Resistance - 1 x daily - 7 x weekly - 1-2 sets - 10 reps

## 2022-04-20 ENCOUNTER — OFFICE VISIT (OUTPATIENT)
Dept: FAMILY MEDICINE CLINIC | Age: 22
End: 2022-04-20
Payer: COMMERCIAL

## 2022-04-20 VITALS
SYSTOLIC BLOOD PRESSURE: 100 MMHG | BODY MASS INDEX: 21.62 KG/M2 | DIASTOLIC BLOOD PRESSURE: 64 MMHG | WEIGHT: 122 LBS | OXYGEN SATURATION: 98 % | HEIGHT: 63 IN | HEART RATE: 74 BPM

## 2022-04-20 DIAGNOSIS — M81.8 FEMALE ATHLETE TRIAD: Primary | ICD-10-CM

## 2022-04-20 DIAGNOSIS — E55.9 VITAMIN D DEFICIENCY: ICD-10-CM

## 2022-04-20 DIAGNOSIS — F50.9 FEMALE ATHLETE TRIAD: Primary | ICD-10-CM

## 2022-04-20 DIAGNOSIS — R79.89 LOW SERUM FOLLICLE STIMULATING HORMONE (FSH): ICD-10-CM

## 2022-04-20 DIAGNOSIS — N91.2 FEMALE ATHLETE TRIAD: Primary | ICD-10-CM

## 2022-04-20 DIAGNOSIS — Z13.820 OSTEOPOROSIS SCREENING: ICD-10-CM

## 2022-04-20 PROCEDURE — 99214 OFFICE O/P EST MOD 30 MIN: CPT | Performed by: FAMILY MEDICINE

## 2022-04-20 NOTE — PROGRESS NOTES
Assessment/Plan:    Yesi Hallman was seen today for results. 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 Await dexa to eval for osteoporosis. Pt will cont care with sports psychologist and . [employer] position as pt has been told she needs to lose wt to advance her career. Multiple injuries could be related to triad. Osteoporosis screening  -     DEXA BONE DENSITY AXIAL SKELETON; Future    Low serum follicle stimulating hormone (FSH)  -     DEXA BONE DENSITY AXIAL SKELETON; Future    Vitamin D deficiency   Vit D 5000 iu qd rec'd. Patient Instructions   Please take any brand of otc vitamin D3 5000 iu daily. Patient: Felisa Baldwin is a 22 y. 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 Has missed occasional menses, but has had period qd x past 3 mo, lasting 4-5 per day. Missed 2-3 menses per yr on avg. Is not sexually active at this time, does not need birth control, no change for preg./    Pt struggles with wt, has been told her size is \"holding (her) back artistically\". Pt thus wants to lose wt but understands risks of decreased bonifacio intake in face of  excessive energy exp/enditure. 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 Vit D low 18. [facility] mildly low (doubt significant). Sees sports psychologist Bonnie Moore, was referred by Sandy Simmons AT. Has 1-2 appt q wk via VV. Had 1st appt prior to Dr Guardado's arrival. Is more aware of intake.  Has also been working virtually with dance nutritionist, is to log intake but has not been adherent on regular basis. Pt reported anx/dep at 9/2021 appt Inderjit was rx'd though pt had insur issue when attempting to refill and thus pt got away from med. Feels she was not on rx long enough to perceive improvement. However, with time and help of Victor Deal, mood has improved and pt feesl she no longer needs rx. Feels supported, enjoys job and colleagues. Forbidden foods: none but feels guilty after eating  Hi/Lo wt since menarche at 16: 140 lb 1/2021 (pandemic), 100 lb at 17  [place] wt per pt: 110-112 lb  Happy with wt: no  Binge/purge: Binged 1 yr ago, not currently  Lax/diuretics: not currently but in past few yrs  Hrs exercise/day: 7 hr per day  Other exercise: no      Current Outpatient Medications   Medication Sig Dispense Refill    naproxen (NAPROSYN) 375 MG tablet Take 1 tablet by mouth 2 times daily (with meals) 60 tablet 3     No current facility-administered medications for this visit. Patient's past medical history,surgical history, family history, medications,  and allergies  were all reviewed and updated as appropriate today. Review of Systems  abv    Physical Exam  Constitutional:       Appearance: Normal appearance. She is well-developed. HENT:      Head: Normocephalic and atraumatic. Right Ear: External ear normal.      Left Ear: External ear normal.   Eyes:      General: No scleral icterus. Right eye: No discharge. Left eye: No discharge. Extraocular Movements: Extraocular movements intact. Conjunctiva/sclera: Conjunctivae normal.   Neck:      Comments: No visualized masses  FROM  Pulmonary:      Effort: Pulmonary effort is normal.   Musculoskeletal:         General: Normal range of motion. Skin:     General: Skin is warm and dry. Neurological:      General: No focal deficit present. Mental Status: She is alert and oriented to person, place, and time.    Psychiatric:         Mood and Affect: Mood normal.         Behavior: Behavior normal.         Thought Content:  Thought content normal.         Judgment: Judgment normal.           /64   Pulse 74   Ht 5' 3\" (1.6 m)   Wt 122 lb (55.3 kg)   SpO2 98%   BMI 21.61 kg/m²

## 2022-04-22 ENCOUNTER — HOSPITAL ENCOUNTER (OUTPATIENT)
Dept: PHYSICAL THERAPY | Age: 22
Setting detail: THERAPIES SERIES
Discharge: HOME | End: 2022-04-22
Payer: COMMERCIAL

## 2022-04-22 ENCOUNTER — HOSPITAL ENCOUNTER (OUTPATIENT)
Dept: WOMENS IMAGING | Age: 22
Discharge: HOME | End: 2022-04-22
Payer: COMMERCIAL

## 2022-04-22 DIAGNOSIS — R79.89 LOW SERUM FOLLICLE STIMULATING HORMONE (FSH): ICD-10-CM

## 2022-04-22 DIAGNOSIS — Z13.820 OSTEOPOROSIS SCREENING: ICD-10-CM

## 2022-04-22 PROCEDURE — 97140 MANUAL THERAPY 1/> REGIONS: CPT

## 2022-04-22 PROCEDURE — 97112 NEUROMUSCULAR REEDUCATION: CPT

## 2022-04-22 PROCEDURE — 77080 DXA BONE DENSITY AXIAL: CPT

## 2022-04-22 NOTE — FLOWSHEET NOTE
The [address]  Phone: [phone]  Fax [phone]    Physical Therapy Treatment Note/ Progress Report:           Date:  2022    Patient Name:  Wally Elkins    :  2000  MRN: [id number]  Restrictions/Precautions:    Medical/Treatment Diagnosis Information:  · Diagnosis: M54.6 L thoracic back pain, S29.012 strain of thoracic back  · Treatment Diagnosis: thoracic pain M54.6, M25.619 thoracic stiffness, R07.1 pain with breathing  Insurance/Certification information:  PT Insurance Information: Miri Valencia, 20 visits, auth needed - AIM  Physician Information:  Referring Practitioner: Dr. Chun Erickson  Has the plan of care been signed (Y/N):        []  Yes  [x]  No     Date of Patient follow up with Physician: as needed      Is this a Progress Report:     []  Yes  [x]  No        If Yes:  Date Range for reporting period:  Beginning 22  Ending     Progress report will be due (10 Rx or 30 days whichever is less):       Recertification will be due (POC Duration  / 90 days whichever is less): 22       Visit # Insurance Allowable Auth Required   In-person * 20 visits (8 used) [x]  Yes - AIM []  No    Telehealth - - []  Yes []  No    Total      * 5 visits approved 22 - 6/10/22      Functional Scale: Dancer functional Outcome Survey:  = 93% ability, 7% disability       Date assessed: 22        Number of Comorbidities:  []0     [x]1-2    []3+    Latex Allergy:  [x]NO      []YES  Preferred Language for Healthcare:   [x]English       []other:      Pain level:  3-5/10    SUBJECTIVE:  Pt reports she has been feeling better. She doesn't have as much pain with coughing or sneezing and pain isn't constant anymore. She does have moments of sharp pain during certain movements such as when turning to look over her shoulder while driving.  She reports she got her bone scan done this morning and hasn't gotten results yet.      OBJECTIVE:   Observation: spine and rib position normal; increased tension of L lower thoracic paraspinals which decreased with STM; Equal thoracic rotation R/L; Continued tenderness of L posterior ribs   Test measurements:      RESTRICTIONS/PRECAUTIONS: concerned about bone density    Exercises/Interventions:     Therapeutic Ex (00835) Sets/Reps/sec Notes/CUES   Self mobilization: mid-thoracic R rotation with extension  cavitation        Prone Press up into L rot    Sidelying Open books to L rot    Thread the needle  R/L x15NMR Focus on segmental control                                      Pt edu: HEP walk through, Performing HEP in order Idea of making diaphragmatic breathing automatic and need for neuromuscular training to make it a habit 4 min    Manual Intervention (62618) Total 15 min    Thoracolumbar roll (mid-thoracic) R rotation  Left sidelying   Seated mid-thoracic distraction/ext/R rot   cavitation   Prone T3-T6 central PA 4 min    Prone T3-T6 R rotation P-A  5 min    Prone T5-T8 R uPA MWM into PPU     L rib 7 facet P-A     STM: thoracic parasinals,  5 min         NMR re-education (65664)  CUES NEEDED   Supine Diaphragmatic breathing series    Diaphragmatic breathing   Breathing with rib expansion RTB  Breathing with TA activation  Breathing with LE in 90/90             Emphasis on diaphragm first, and filling posterior ribs   Seated Diaphragmatic breathing series    Diaphragmatic breathing  Breathing with rib expansion RTB  Breathing with no monies RTB          Emphasis on diaphragm first, and filling posterior ribs        Segmental Prone Press Up  Segmental Cat-Cow  Quadruped rockback     2x12  2x12 Emphasis on controlled segmental movement, cues for decreased rib flare        Paloff Press with breathing      TB rotation 2x10 R/L      2x10 R/L Breath out with press out, breath in with arms in   Supine on FR:   Diaphragmatic breathing  Marching (arms on ground)  Arm angels  Alternating arm flexion   2x10  2x10  2x10  2x10   Avoiding rib flare/ excessive thoracic extension  NV inc speed   Stand against wall w/ port de bras arms NV    Port de bras arm with tband NV    Upper cut slides up wall without rib flare NV              Therapeutic Activity (77282)     Use of arm swing during gait NV? MHP with IFC 10 min (through AT services provided by [facility])       Therapeutic Exercise and NMR EXR  [] (54360) Provided verbal/tactile cueing for activities related to strengthening, flexibility, endurance, ROM  for improvements in proximal hip and core control with self care, mobility, lifting and ambulation. [x] (67850) Provided verbal/tactile cueing for activities related to improving balance, coordination, kinesthetic sense, posture, motor skill, proprioception  to assist with core control in self care, mobility, lifting, and ambulation.      Therapeutic Activities:    [] (35734 or 86097) Provided verbal/tactile cueing for activities related to improving balance, coordination, kinesthetic sense, posture, motor skill, proprioception and motor activation to allow for proper function  with self care and ADLs  [] (40433) Provided training and instruction to the patient for proper core and proximal hip recruitment and positioning with ambulation re-education     Home Exercise Program:    [x] (13137) Reviewed/Progressed HEP activities related to strengthening, flexibility, endurance, ROM of core, proximal hip and LE for functional self-care, mobility, lifting and ambulation   [] (22767) Reviewed/Progressed HEP activities related to improving balance, coordination, kinesthetic sense, posture, motor skill, proprioception of core, proximal hip and LE for self care, mobility, lifting, and ambulation      Manual Treatments:  PROM / STM / Oscillations-Mobs:  G-I, II, III, IV (PA's, Inf., Post.)  [x] (09618) Provided manual therapy to mobilize proximal hip and LS spine soft tissue/joints for the purpose of modulating pain, promoting relaxation,  increasing ROM, reducing/eliminating soft tissue swelling/inflammation/restriction, improving soft tissue extensibility and allowing for proper ROM for normal function with self care, mobility, lifting and ambulation. Modalities:   HP with IFC x 10 min at end (throught AT services provided by [facility] therefore not billed through her insurancet)     Charges  Timed Code Treatment Minutes: 50   Total Treatment Minutes: 50     [] EVAL (LOW) 12136   [] EVAL (MOD) 24420   [] EVAL (HIGH) 82737   [] RE-EVAL     [] MK(97176) x     [] IONTO  [x] NMR (84765) x 2     [] VASO  [x] Manual (43970) x 1     [] Other:  [] TA x      [] Mech Traction (33105)  [] ES(attended) (07229)      [] ES (un) (84634):     Goals:   Patient stated goal: To reduce pain  []? Progressing: []? Met: []? Not Met: []? Adjusted     Therapist goals for Patient:   Short Term Goals: To be achieved in: 2 weeks  1. Independent in HEP and progression per patient tolerance, in order to prevent re-injury. []? Progressing: [x]? Met: []? Not Met: []? Adjusted  2. Patient will have a decrease in pain to facilitate improvement in movement, function, and ADLs as indicated by Functional Deficits. []? Progressing: [x]? Met: []? Not Met: []? Adjusted        Long Term Goals: To be achieved in: 6 weeks  1. Pt able to rotate through torso symmetrically bilaterally and WNL without increased pain or restriction. []? Progressing: []? Met: []? Not Met: []? Adjusted  2. Patient will be able to take short breaths, deep breaths and experience sneezing/coughing without increase pain or restriction. []? Progressing: []? Met: []? Not Met: []? Adjusted  3. Patient will demonstrate an increase in Strength to good proximal shoulder, scapular and core activation to allow for proper functional mobility as indicated by patients Functional Deficits. []? Progressing: []? Met: []?  Not Met: []? Adjusted  4. Patient will be able to push/pull without increased symptoms or restriction. []? Progressing: []? Met: []? Not Met: []? Adjusted  5. 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 without increased pain or restriction for a full return to PLOF and return to her career path.    []? Progressing: []? Met: []? Not Met: []? Adjusted         Progression Towards Functional goals:  [] Patient is progressing as expected towards functional goals listed. [] Progression is slowed due to complexities listed. [] Progression has been slowed due to co-morbidities. [x] Plan just implemented, too soon to assess goals progression  [] Other:     Overall Progression Towards Functional goals/ Treatment Progress Update:  [] Patient is progressing as expected towards functional goals listed. [] Progression is slowed due to complexities/Impairments listed. [] Progression has been slowed due to co-morbidities. [x] Plan just implemented, too soon to assess goals progression <30days   [] Goals require adjustment due to lack of progress  [] Patient is not progressing as expected and requires additional follow up with physician  [] Other    Prognosis for POC: [x] Good [] Fair  [] Poor      Patient requires continued skilled intervention: [x] Yes  [] No    Treatment/Activity Tolerance:  [x] Patient able to complete treatment  [] Patient limited by fatigue  [] Patient limited by pain    [] Patient limited by other medical complications  [] Other:     ASSESSMENT: Pt has maintained good rib and spinal positioning. Thoracic paraspinals still have more tension on the L, but decreased from previous sessions. Today, she has more stiffness of the upper thoracic spine. Progressed exercises today to include core control with UE movement while avoid rib flare motion. Also worked towards more control into thoracic rotation range.      Return to Play: (if applicable)   []  Stage 1: Intro to Strength   []  Stage 2: Return to Run and Strength   []  Stage 3: Return to Jump and Strength   []  Stage 4: Dynamic Strength and Agility   []  Stage 5: Sport Specific Training     []  Ready to Return to Play, Meets All Above Stages   []  Not Ready for Return to Sports   Comments:                               PLAN: See eval. Pt to be seen 2 times a week for 6 weeks. [x] Continue per plan of care [] Alter current plan (see comments above)  [] Plan of care initiated [] Hold pending MD visit [] Discharge      Electronically signed by:  Gricelda Garland, PT, DPT, ATC, OCS [id number]  Jackelin Pugh, SPT. Therapist was present, directed the patient's care, made skilled judgement, and was responsible for assessment and treatment of the patient. Note: If patient does not return for scheduled/ recommended follow up visits, this note will serve as a discharge from care along with most recent update on progress. Access Code: [email or web address]  URL: [email or web address]  Date: 04/15/2022  Prepared by: Gricelda Garland    Exercises  Prone Press Up - 1 x daily - 7 x weekly - 3 sets - 10 reps  Sidelying Open Book Thoracic Lumbar Rotation and Extension - 1 x daily - 7 x weekly - 3 sets - 10 reps  Quadruped Thoracic Rotation - Reach Under - 1 x daily - 7 x weekly - 3 sets - 10 reps  Supine Diaphragmatic Breathing - 1 x daily - 7 x weekly - 3 sets - 10 reps  Hooklying Rib Cage Breathing - 1 x daily - 7 x weekly - 3 sets - 10 reps  Supine Breathing with Caregiver Resistance at Ribcage - 1 x daily - 7 x weekly - 3 sets - 10 reps  Seated Diaphragmatic Breathing - 1 x daily - 7 x weekly - 3 sets - 10 reps  Winged Arm Breathing - 1 x daily - 7 x weekly - 3 sets - 10 reps    Access Code: [email or web address]  URL: [email or web address]  Date: 04/18/2022  Prepared by: Gricelda Garland    Exercises  Diaphragmatic Breathing in 90/90 - 1 x daily - 7 x weekly - 3 sets - 10 reps  Prone Press Up - 1 x daily - 7 x weekly - 3 sets - 10 reps  Cat-Camel - 1 x daily - 7 x weekly - 3 sets - 10 reps  Hip Hinge Rock Back - 1 x daily - 7 x weekly - 3 sets - 10 reps  Standing Anti-Rotation Press with Anchored Resistance - 1 x daily - 7 x weekly - 1-2 sets - 10 reps

## 2022-04-27 ENCOUNTER — HOSPITAL ENCOUNTER (OUTPATIENT)
Dept: PHYSICAL THERAPY | Age: 22
Setting detail: THERAPIES SERIES
Discharge: HOME | End: 2022-04-27
Payer: COMMERCIAL

## 2022-04-27 PROCEDURE — 97112 NEUROMUSCULAR REEDUCATION: CPT

## 2022-04-27 PROCEDURE — 97140 MANUAL THERAPY 1/> REGIONS: CPT

## 2022-04-27 NOTE — FLOWSHEET NOTE
[address], [address]  Phone: [phone]  Fax [phone]    Physical Therapy Treatment Note/ Progress Report:           Date:  2022    Patient Name:  Felisa Baldwin    :  2000  MRN: [id number]  Restrictions/Precautions:    Medical/Treatment Diagnosis Information:  · Diagnosis: M54.6 L thoracic back pain, S29.012 strain of thoracic back  · Treatment Diagnosis: thoracic pain M54.6, M25.619 thoracic stiffness, R07.1 pain with breathing  Insurance/Certification information:  PT Insurance Information: Jai Muniz, 20 visits, auth needed - AIM  Physician Information:  Referring Practitioner: Dr. Eugenio Redman  Has the plan of care been signed (Y/N):        []  Yes  [x]  No     Date of Patient follow up with Physician: as needed      Is this a Progress Report:     []  Yes  [x]  No        If Yes:  Date Range for reporting period:  Beginning 22  Ending     Progress report will be due (10 Rx or 30 days whichever is less): 11      Recertification will be due (POC Duration  / 90 days whichever is less): 22       Visit # Insurance Allowable Auth Required   In-person  20 visits (8 used) [x]  Yes - AIM []  No    Telehealth - - []  Yes []  No    Total      * 5 visits approved 22 - 6/10/22      Functional Scale: Dancer functional Outcome Survey: 84/90 = 93% ability, 7% disability       Date assessed: 22        Number of Comorbidities:  []0     [x]1-2    []3+    Latex Allergy:  [x]NO      []YES  Preferred Language for Healthcare:   [x]English       []other:      Pain level:  6/10    SUBJECTIVE:  Pt says that she felt like she was getting much better and breathing no longer hurt and she was able to do most torso motions with only a ache pain and no real sharp pain, but then she had a major perforamance this past weekend on top of a lot of rehearsals and she feels it has flared back up again.  It now hurts again to breath and feels she can't rotate to the L.      OBJECTIVE:   Observation: L rotation 25% pain, R rotation 50% mild pain, T5-8 rotated L, spinous process tender at T7 and L rib facet T7, L>R paraspinal spasm and tenderness   Test measurements:      RESTRICTIONS/PRECAUTIONS: concerned about bone density    Exercises/Interventions:     Therapeutic Ex (26164) Sets/Reps/sec Notes/CUES   Self mobilization: mid-thoracic R rotation with extension  cavitation        Prone Press up into L rot    Sidelying Open books to L rot    Thread the needle  NMR Focus on segmental control                                      Pt edu: HEP walk through, Performing HEP in order Idea of making diaphragmatic breathing automatic and need for neuromuscular training to make it a habit     Manual Intervention (00188) Total 24 min    Thoracolumbar roll (mid-thoracic) R rotation gd V 5 min Left sidelying   Seated mid-thoracic distraction/ext/R rot  gd V 6 min Multiple cavitations [id number]   Prone T3-T6 central PA 4 min cavitation   Prone generalized rib P-A with twist 2 min Multiple cavitation   Prone T3-T6 R rotation P-A      Prone T5-T8 R uPA MWM into PPU     L rib 7 facet P-A     STM: thoracic parasinals, L mid & low trap 5 min         NMR re-education (79089)  CUES NEEDED   Supine Diaphragmatic breathing series    Diaphragmatic breathing   Breathing with rib expansion RTB  Breathing with TA activation  Breathing with LE in 90/90             Emphasis on diaphragm first, and filling posterior ribs   Seated Diaphragmatic breathing series    Diaphragmatic breathing  Breathing with rib expansion RTB  Breathing with no monies RTB          Emphasis on diaphragm first, and filling posterior ribs        Segmental Prone Press Up  Segmental Cat-Cow  Quadruped rockback  S/L \"open book\" rib rotation     1x12  1x10R/L Emphasis on controlled segmental movement, cues for decreased rib flare        Paloff Press with breathing      TB rotation  Breath out with press out, breath in with arms in   Supine on FR:   Diaphragmatic breathing  Marching (arms on ground)  Arm angels  Alternating arm flexion   2x10  2x10  2x10  2x10   Avoiding rib flare/ excessive thoracic extension  NV inc speed   Stand against wall w/ port de bras arms NV    Port de bras arm with tband NV    Upper cut slides up wall without rib flare NV              Therapeutic Activity (11510)     Use of arm swing during gait NV? MHP with IFC 10 min (through AT services provided by [facility])       Therapeutic Exercise and NMR EXR  [] (40789) Provided verbal/tactile cueing for activities related to strengthening, flexibility, endurance, ROM  for improvements in proximal hip and core control with self care, mobility, lifting and ambulation. [x] (37043) Provided verbal/tactile cueing for activities related to improving balance, coordination, kinesthetic sense, posture, motor skill, proprioception  to assist with core control in self care, mobility, lifting, and ambulation.      Therapeutic Activities:    [] (12737 or 54703) Provided verbal/tactile cueing for activities related to improving balance, coordination, kinesthetic sense, posture, motor skill, proprioception and motor activation to allow for proper function  with self care and ADLs  [] (67994) Provided training and instruction to the patient for proper core and proximal hip recruitment and positioning with ambulation re-education     Home Exercise Program:    [x] (10965) Reviewed/Progressed HEP activities related to strengthening, flexibility, endurance, ROM of core, proximal hip and LE for functional self-care, mobility, lifting and ambulation   [] (79250) Reviewed/Progressed HEP activities related to improving balance, coordination, kinesthetic sense, posture, motor skill, proprioception of core, proximal hip and LE for self care, mobility, lifting, and ambulation      Manual Treatments:  PROM / STM / Oscillations-Mobs:  G-I, II, III, IV (PA's, Inf., Post.)  [x] (54480) Provided manual therapy to mobilize proximal hip and LS spine soft tissue/joints for the purpose of modulating pain, promoting relaxation,  increasing ROM, reducing/eliminating soft tissue swelling/inflammation/restriction, improving soft tissue extensibility and allowing for proper ROM for normal function with self care, mobility, lifting and ambulation. Modalities:   HP     Charges  Timed Code Treatment Minutes: 45   Total Treatment Minutes: 45     [] EVAL (LOW) 48746   [] EVAL (MOD) 93012   [] EVAL (HIGH) 98403   [] RE-EVAL     [] CY(67505) x     [] IONTO  [x] NMR (63851) x 1     [] VASO  [x] Manual (42257) x 2     [] Other:  [] TA x      [] Mech Traction (08989)  [] ES(attended) (16151)      [] ES (un) (32293):     Goals:   Patient stated goal: To reduce pain  []? Progressing: []? Met: []? Not Met: []? Adjusted     Therapist goals for Patient:   Short Term Goals: To be achieved in: 2 weeks  1. Independent in HEP and progression per patient tolerance, in order to prevent re-injury. []? Progressing: [x]? Met: []? Not Met: []? Adjusted  2. Patient will have a decrease in pain to facilitate improvement in movement, function, and ADLs as indicated by Functional Deficits. []? Progressing: [x]? Met: []? Not Met: []? Adjusted        Long Term Goals: To be achieved in: 6 weeks  1. Pt able to rotate through torso symmetrically bilaterally and WNL without increased pain or restriction. []? Progressing: []? Met: []? Not Met: []? Adjusted  2. Patient will be able to take short breaths, deep breaths and experience sneezing/coughing without increase pain or restriction. []? Progressing: []? Met: []? Not Met: []? Adjusted  3. Patient will demonstrate an increase in Strength to good proximal shoulder, scapular and core activation to allow for proper functional mobility as indicated by patients Functional Deficits. []? Progressing: []?  Met: []? Not Met: []? Adjusted  4. Patient will be able to push/pull without increased symptoms or restriction. []? Progressing: []? Met: []? Not Met: []? Adjusted  5. 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    []? Progressing: []? Met: []? Not Met: []? Adjusted         Progression Towards Functional goals:  [] Patient is progressing as expected towards functional goals listed. [] Progression is slowed due to complexities listed. [] Progression has been slowed due to co-morbidities. [x] Plan just implemented, too soon to assess goals progression  [] Other:     Overall Progression Towards Functional goals/ Treatment Progress Update:  [] Patient is progressing as expected towards functional goals listed. [] Progression is slowed due to complexities/Impairments listed. [] Progression has been slowed due to co-morbidities. [x] Plan just implemented, too soon to assess goals progression <30days   [] Goals require adjustment due to lack of progress  [] Patient is not progressing as expected and requires additional follow up with physician  [] Other    Prognosis for POC: [x] Good [] Fair  [] Poor      Patient requires continued skilled intervention: [x] Yes  [] No    Treatment/Activity Tolerance:  [x] Patient able to complete treatment  [] Patient limited by fatigue  [] Patient limited by pain    [] Patient limited by other medical complications  [] Other:     ASSESSMENT: Pt had multiple times and multiple level cavitation with grade V mobilizations today but had immediate relief of her more acute pain and a return to normal thoracic rotation B afterwards. Pain reduced to 3/10 by end of session.       Return to Play: (if applicable)   []  Stage 1: Intro to Strength   []  Stage 2: Return to Run and Strength   []  Stage 3: Return to Jump and Strength   []  Stage 4: Dynamic Strength and Agility   []  Stage 5: Sport Specific Training     []  Ready to Return to Play, Meets All Above Stages   []  Not Ready for Return to Sports   Comments:                            PLAN: See win. Pt to be seen 2 times a week for 6 weeks. [x] Continue per plan of care [] Alter current plan (see comments above)  [] Plan of care initiated [] Hold pending MD visit [] Discharge      Electronically signed by:  Trever Su, PT, DPT, ATC, OCS [id number]      Note: If patient does not return for scheduled/ recommended follow up visits, this note will serve as a discharge from care along with most recent update on progress. Access Code: [email or web address]  URL: [employer]/  Date: 04/15/2022  Prepared by: Trever Su    Exercises  Prone Press Up - 1 x daily - 7 x weekly - 3 sets - 10 reps  Sidelying Open Book Thoracic Lumbar Rotation and Extension - 1 x daily - 7 x weekly - 3 sets - 10 reps  Quadruped Thoracic Rotation - Reach Under - 1 x daily - 7 x weekly - 3 sets - 10 reps  Supine Diaphragmatic Breathing - 1 x daily - 7 x weekly - 3 sets - 10 reps  Hooklying Rib Cage Breathing - 1 x daily - 7 x weekly - 3 sets - 10 reps  Supine Breathing with Caregiver Resistance at Ribcage - 1 x daily - 7 x weekly - 3 sets - 10 reps  Seated Diaphragmatic Breathing - 1 x daily - 7 x weekly - 3 sets - 10 reps  Winged Arm Breathing - 1 x daily - 7 x weekly - 3 sets - 10 reps    Access Code: [email or web address]  URL: [employer]/  Date: 04/18/2022  Prepared by: Trever Su    Exercises  Diaphragmatic Breathing in 90/90 - 1 x daily - 7 x weekly - 3 sets - 10 reps  Prone Press Up - 1 x daily - 7 x weekly - 3 sets - 10 reps  Cat-Camel - 1 x daily - 7 x weekly - 3 sets - 10 reps  Hip Hinge Rock Back - 1 x daily - 7 x weekly - 3 sets - 10 reps  Standing Anti-Rotation Press with Anchored Resistance - 1 x daily - 7 x weekly - 1-2 sets - 10 reps

## 2022-04-29 ENCOUNTER — HOSPITAL ENCOUNTER (OUTPATIENT)
Dept: PHYSICAL THERAPY | Age: 22
Setting detail: THERAPIES SERIES
Discharge: HOME | End: 2022-04-29
Payer: COMMERCIAL

## 2022-04-29 PROCEDURE — 97110 THERAPEUTIC EXERCISES: CPT

## 2022-04-29 PROCEDURE — 97140 MANUAL THERAPY 1/> REGIONS: CPT

## 2022-04-29 NOTE — FLOWSHEET NOTE
[facility] [address], [address]  Phone: [phone]  Fax [phone]    Physical Therapy Treatment Note/ Progress Report:           Date:  2022    Patient Name:  Priya Reaves    :  2000  MRN: [id number]  Restrictions/Precautions:    Medical/Treatment Diagnosis Information:  · Diagnosis: M54.6 L thoracic back pain, S29.012 strain of thoracic back  · Treatment Diagnosis: thoracic pain M54.6, M25.619 thoracic stiffness, R07.1 pain with breathing  Insurance/Certification information:  PT Insurance Information: [address], 20 visits, auth needed - AIM  Physician Information:  Referring Practitioner: Dr. Abiel Niño  Has the plan of care been signed (Y/N):        []  Yes  [x]  No     Date of Patient follow up with Physician: as needed      Is this a Progress Report:     []  Yes  [x]  No        If Yes:  Date Range for reporting period:  Beginning 22  Ending     Progress report will be due (10 Rx or 30 days whichever is less):       Recertification will be due (POC Duration  / 90 days whichever is less): 22       Visit # Insurance Allowable Auth Required   In-person ,   20 visits (8 used) [x]  Yes - AIM []  No    Telehealth - - []  Yes []  No    Total      * 5 visits approved 22 - 6/10/22  Approved another 5 visits  - 22      Functional Scale: Dancer functional Outcome Survey: 90 = 93% ability, 7% disability       Date assessed: 22        Number of Comorbidities:  []0     [x]1-2    []3+    Latex Allergy:  [x]NO      []YES  Preferred Language for Healthcare:   [x]English       []other:      Pain level:  4/10    SUBJECTIVE:  Pt reports she felt really good with full ROM after last session. 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  She says even though now it still isn't as bad but left rotation is still most affected.       OBJECTIVE:   Observation: L rotation 50% pain mild pain at end-range, R rotation 80% no pain, T6-8 rotated L, spinous process tender at T7 an, L paraspinal mild spasm and tenderness   Test measurements:      RESTRICTIONS/PRECAUTIONS: concerned about bone density    Exercises/Interventions:     Therapeutic Ex (91246) Sets/Reps/sec Notes/CUES   Self mobilization: mid-thoracic R rotation with extension  cavitation        Prone Press up into L rot    Sidelying Open books to L rot x5   Thread the needle  R/L x5NMR Focus on segmental control   Standing scap row Red 2x10 Cued no rib flare   Scap retract B horiz abd (2nd position) Red 2x10 \"   Step up, high march, B GH ext Red 2x10 \"                       Pt edu: HEP walk through, Performing HEP in order Idea of making diaphragmatic breathing automatic and need for neuromuscular training to make it a habit     Manual Intervention (65518) Total 25 min    Thoracolumbar roll (mid-thoracic) R rotation gd V 4 min Left sidelying   Seated mid-thoracic distraction/ext/R rot  gd V 6 min Multiple cavitations [id number]   Prone T3-T6 central PA 5 min cavitation   Prone generalized rib P-A with twist 2 min Multiple cavitation   Prone T3-T6 R rotation P-A      Prone T5-T8 R uPA MWM into PPU     L rib 7 facet P-A     STM: thoracic parasinals, L mid & low trap 5 min    Cupping: thoracolumbar paraspinals L>R 5 min                   NMR re-education (63277)  CUES NEEDED   Supine Diaphragmatic breathing series    Diaphragmatic breathing   Breathing with rib expansion RTB  Breathing with TA activation  Breathing with LE in 90/90             Emphasis on diaphragm first, and filling posterior ribs   Seated Diaphragmatic breathing series    Diaphragmatic breathing  Breathing with rib expansion RTB  Breathing with no monies RTB          Emphasis on diaphragm first, and filling posterior ribs        Segmental Prone Press Up  Segmental Cat-Cow  Quadruped rockback  S/L \"open book\" rib rotation      Emphasis on controlled segmental movement, cues for decreased rib flare        Paloff Press with breathing      TB rotation  Breath out with press out, breath in with arms in   Supine on FR:   Diaphragmatic breathing  Marching (arms on ground)  Arm angels  Alternating arm flexion   1x10     Avoiding rib flare/ excessive thoracic extension  NV inc speed   Stand against wall w/ port de bras arms NV    Port de bras arm with tband NV    Upper cut slides up wall without rib flare NV              Therapeutic Activity (37245)     Use of arm swing during gait NV? MHP with IFC  (through AT services provided by [facility])       Therapeutic Exercise and NMR EXR  [x] (56526) Provided verbal/tactile cueing for activities related to strengthening, flexibility, endurance, ROM  for improvements in proximal hip and core control with self care, mobility, lifting and ambulation.  [] (31643) Provided verbal/tactile cueing for activities related to improving balance, coordination, kinesthetic sense, posture, motor skill, proprioception  to assist with core control in self care, mobility, lifting, and ambulation.      Therapeutic Activities:    [] (20411 or 81046) Provided verbal/tactile cueing for activities related to improving balance, coordination, kinesthetic sense, posture, motor skill, proprioception and motor activation to allow for proper function  with self care and ADLs  [] (90177) Provided training and instruction to the patient for proper core and proximal hip recruitment and positioning with ambulation re-education     Home Exercise Program:    [x] (76074) Reviewed/Progressed HEP activities related to strengthening, flexibility, endurance, ROM of core, proximal hip and LE for functional self-care, mobility, lifting and ambulation   [] (54004) Reviewed/Progressed HEP activities related to improving balance, coordination, kinesthetic sense, posture, motor skill, proprioception of core, proximal hip and LE for self care, mobility, lifting, and ambulation      Manual Treatments:  PROM / STM / Oscillations-Mobs:  G-I, II, III, IV (PA's, Inf., Post.)  [x] (74624) Provided manual therapy to mobilize proximal hip and LS spine soft tissue/joints for the purpose of modulating pain, promoting relaxation,  increasing ROM, reducing/eliminating soft tissue swelling/inflammation/restriction, improving soft tissue extensibility and allowing for proper ROM for normal function with self care, mobility, lifting and ambulation. Modalities:   HP     Charges  Timed Code Treatment Minutes: 50   Total Treatment Minutes: 50     [] EVAL (LOW) 16021   [] EVAL (MOD) 24658   [] EVAL (HIGH) 81923   [] RE-EVAL     [x] UD(26183) x  1   [] IONTO  [] NMR (63274) x      [] VASO  [x] Manual (48161) x 2     [] Other:  [] TA x      [] Mech Traction (72219)  [] ES(attended) (47458)      [] ES (un) (92054):     Goals:   Patient stated goal: To reduce pain  [x]? Progressing: []? Met: []? Not Met: []? Adjusted     Therapist goals for Patient:   Short Term Goals: To be achieved in: 2 weeks  1. Independent in HEP and progression per patient tolerance, in order to prevent re-injury. []? Progressing: [x]? Met: []? Not Met: []? Adjusted  2. Patient will have a decrease in pain to facilitate improvement in movement, function, and ADLs as indicated by Functional Deficits. []? Progressing: [x]? Met: []? Not Met: []? Adjusted        Long Term Goals: To be achieved in: 6 weeks  1. Pt able to rotate through torso symmetrically bilaterally and WNL without increased pain or restriction. []? Progressing: []? Met: []? Not Met: []? Adjusted  2. Patient will be able to take short breaths, deep breaths and experience sneezing/coughing without increase pain or restriction. [x]? Progressing: []? Met: []? Not Met: []? Adjusted  3.  Patient will demonstrate an increase in Strength to good proximal shoulder, scapular and core activation to allow for proper functional mobility as indicated by patients Functional Deficits. []? Progressing: []? Met: []? Not Met: []? Adjusted  4. Patient will be able to push/pull without increased symptoms or restriction. [x]? Progressing: []? Met: []? Not Met: []? Adjusted  5. 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    []? Progressing: []? Met: []? Not Met: []? Adjusted         Progression Towards Functional goals:  [x] Patient is progressing as expected towards functional goals listed. [] Progression is slowed due to complexities listed. [] Progression has been slowed due to co-morbidities. [] Plan just implemented, too soon to assess goals progression  [] Other:     Overall Progression Towards Functional goals/ Treatment Progress Update:  [] Patient is progressing as expected towards functional goals listed. [] Progression is slowed due to complexities/Impairments listed. [] Progression has been slowed due to co-morbidities. [x] Plan just implemented, too soon to assess goals progression <30days   [] Goals require adjustment due to lack of progress  [] Patient is not progressing as expected and requires additional follow up with physician  [] Other    Prognosis for POC: [x] Good [] Fair  [] Poor      Patient requires continued skilled intervention: [x] Yes  [] No    Treatment/Activity Tolerance:  [x] Patient able to complete treatment  [] Patient limited by fatigue  [] Patient limited by pain    [] Patient limited by other medical complications  [] Other:     ASSESSMENT: Pt did have some reoccurrence of muscle spasm and spinal segment dysfunction it was not nearly as severe as last visit. T7-11 continue to be most painful and problematic segments.  We were able to transition to more upright and coordination of full body kinetic chain motions today for her therapy exercise. Return to Play: (if applicable)   []  Stage 1: Intro to Strength   []  Stage 2: Return to Run and Strength   []  Stage 3: Return to Jump and Strength   []  Stage 4: Dynamic Strength and Agility   []  Stage 5: Sport Specific Training     []  Ready to Return to Play, Meets All Above Stages   []  Not Ready for Return to Sports   Comments:                            PLAN: See eval. Pt to be seen 2 times a week for 6 weeks. [x] Continue per plan of care [] Alter current plan (see comments above)  [] Plan of care initiated [] Hold pending MD visit [] Discharge      Electronically signed by:  Faraz Cuenca, PT, DPT, ATC, OCS [id number]      Note: If patient does not return for scheduled/ recommended follow up visits, this note will serve as a discharge from care along with most recent update on progress. Access Code: [email or web address]  URL: [email or web address]  Date: 04/15/2022  Prepared by: Faraz Cuenca    Exercises  Prone Press Up - 1 x daily - 7 x weekly - 3 sets - 10 reps  Sidelying Open Book Thoracic Lumbar Rotation and Extension - 1 x daily - 7 x weekly - 3 sets - 10 reps  Quadruped Thoracic Rotation - Reach Under - 1 x daily - 7 x weekly - 3 sets - 10 reps  Supine Diaphragmatic Breathing - 1 x daily - 7 x weekly - 3 sets - 10 reps  Hooklying Rib Cage Breathing - 1 x daily - 7 x weekly - 3 sets - 10 reps  Supine Breathing with Caregiver Resistance at Ribcage - 1 x daily - 7 x weekly - 3 sets - 10 reps  Seated Diaphragmatic Breathing - 1 x daily - 7 x weekly - 3 sets - 10 reps  Winged Arm Breathing - 1 x daily - 7 x weekly - 3 sets - 10 reps    Access Code: [email or web address]  URL: [email or web address]  Date: 04/18/2022  Prepared by: Faraz Cuenca    Exercises  Diaphragmatic Breathing in 90/90 - 1 x daily - 7 x weekly - 3 sets - 10 reps  Prone Press Up - 1 x daily - 7 x weekly - 3 sets - 10 reps  Cat-Camel - 1 x daily - 7 x weekly - 3 sets - 10 reps  Hip Hinge Rock Back - 1 x daily - 7 x weekly - 3 sets - 10 reps  Standing Anti-Rotation Press with Anchored Resistance - 1 x daily - 7 x weekly - 1-2 sets - 10 reps

## 2022-05-04 ENCOUNTER — HOSPITAL ENCOUNTER (OUTPATIENT)
Dept: PHYSICAL THERAPY | Age: 22
Setting detail: THERAPIES SERIES
Discharge: HOME | End: 2022-05-04
Payer: COMMERCIAL

## 2022-05-04 PROCEDURE — 97110 THERAPEUTIC EXERCISES: CPT

## 2022-05-04 PROCEDURE — 97140 MANUAL THERAPY 1/> REGIONS: CPT

## 2022-05-04 NOTE — FLOWSHEET NOTE
The [address] [place] [address], Jan Cronin [place] [address], [address]  Phone: [phone]  Fax [phone]    Physical Therapy Treatment Note/ Progress Report:           Date:  2022    Patient Name:  Maranda Appiah    :  2000  MRN: [id number]  Restrictions/Precautions:    Medical/Treatment Diagnosis Information:  · Diagnosis: M54.6 L thoracic back pain, S29.012 strain of thoracic back  · Treatment Diagnosis: thoracic pain M54.6, M25.619 thoracic stiffness, R07.1 pain with breathing  Insurance/Certification information:  PT Insurance Information: [facility], 20 visits, auth needed - AIM  Physician Information:  Referring Practitioner: Dr. Maura Vee  Has the plan of care been signed (Y/N):        []  Yes  [x]  No     Date of Patient follow up with Physician: as needed      Is this a Progress Report:     []  Yes  [x]  No        If Yes:  Date Range for reporting period:  Beginning 22  Ending     Progress report will be due (10 Rx or 30 days whichever is less): 3/09/32      Recertification will be due (POC Duration  / 90 days whichever is less): 22       Visit # Insurance Allowable Auth Required   In-person ,   20 visits (8 used) [x]  Yes - AIM []  No    Telehealth - - []  Yes []  No    Total      * 5 visits approved 22 - 6/10/22  Approved another 5 visits  - 22      Functional Scale: Dancer functional Outcome Survey: 8490 = 93% ability, 7% disability       Date assessed: 22        Number of Comorbidities:  []0     [x]1-2    []3+    Latex Allergy:  [x]NO      []YES  Preferred Language for Healthcare:   [x]English       []other:      Pain level:  2-3/10    SUBJECTIVE:  Pt says she overall is starting to feel about 80% improved. Overall her motion is a lot better, only end range L rotation feels limited at this point and the pain at end range is no longer sharp pain.  She also reports that if she feels herself tightening up she can now stretch and do parts of her HEP and manage it herself.       OBJECTIVE:   Observation: L rotation 80% pain mild pain at end-range, R rotation 95% no pain, T10 hypomobile with central P-A and with L transverse P-A, L paraspinal mild spasm and tenderness   Test measurements:      RESTRICTIONS/PRECAUTIONS:     Exercises/Interventions:     Therapeutic Ex (13815) Sets/Reps/sec Notes/CUES   Self mobilization: mid-thoracic R rotation with extension  cavitation        Prone Press up into L rot    Sidelying Open books to L rot x5   Thread the needle  NMR Focus on segmental control   Standing scap row Cued no rib flare   Scap retract B horiz abd (2nd position) Red 2x10 \"   Step up, high march, B GH ext  \"   Kneeling abdominal D1 and D2 red 1x10ea R/L Cued on breathing   Ref: arm lowering  Ref: arm add  Ref: arm circles 1R1B 1x10  1R1B 1x10  1R1B 1x5ea              Pt edu: HEP walk through, Performing HEP in order Idea of making diaphragmatic breathing automatic and need for neuromuscular training to make it a habit     Manual Intervention (17398) Total 23 min    Thoracolumbar roll (mid-thoracic) R rotation  Left sidelying   Seated mid-thoracic distraction/ext/L rot  gd V 5 min Multiple cavitations   Prone T3-T6 central PA 3 min cavitation   Prone generalized rib P-A with twist 2 min cavitation   Prone T3-T6 R rotation P-A      Prone T5-T8 R uPA MWM into PPU     L rib 7 facet P-A     STM: thoracic parasinals, L mid & low trap, L QL, L glutes 13 min    Cupping: thoracolumbar paraspinals L>R                    NMR re-education (68190)  CUES NEEDED   Supine Diaphragmatic breathing series    Diaphragmatic breathing   Breathing with rib expansion RTB  Breathing with TA activation  Breathing with LE in 90/90             Emphasis on diaphragm first, and filling posterior ribs   Seated Diaphragmatic breathing series    Diaphragmatic breathing  Breathing with rib expansion RTB  Breathing with no monies RTB Emphasis on diaphragm first, and filling posterior ribs        Segmental Prone Press Up  Segmental Cat-Cow  Quadruped rockback  S/L \"open book\" rib rotation      Emphasis on controlled segmental movement, cues for decreased rib flare        Paloff Press with breathing      TB rotation  Breath out with press out, breath in with arms in   Supine on FR:   Diaphragmatic breathing  Marching (arms on ground)  Arm angels  Alternating arm flexion   1x10     Avoiding rib flare/ excessive thoracic extension  NV inc speed   Stand against wall w/ port de bras arms NV    Port de bras arm with tband NV    Upper cut slides up wall without rib flare x10              Therapeutic Activity (28358)                              MHP with IFC  (through AT services provided by [facility])       Therapeutic Exercise and NMR EXR  [x] (37576) Provided verbal/tactile cueing for activities related to strengthening, flexibility, endurance, ROM  for improvements in proximal hip and core control with self care, mobility, lifting and ambulation.  [] (62547) Provided verbal/tactile cueing for activities related to improving balance, coordination, kinesthetic sense, posture, motor skill, proprioception  to assist with core control in self care, mobility, lifting, and ambulation.      Therapeutic Activities:    [] (56974 or 60420) Provided verbal/tactile cueing for activities related to improving balance, coordination, kinesthetic sense, posture, motor skill, proprioception and motor activation to allow for proper function  with self care and ADLs  [] (11821) Provided training and instruction to the patient for proper core and proximal hip recruitment and positioning with ambulation re-education     Home Exercise Program:    [x] (14240) Reviewed/Progressed HEP activities related to strengthening, flexibility, endurance, ROM of core, proximal hip and LE for functional self-care, mobility, lifting and ambulation   [] (95210) Reviewed/Progressed HEP activities related to improving balance, coordination, kinesthetic sense, posture, motor skill, proprioception of core, proximal hip and LE for self care, mobility, lifting, and ambulation      Manual Treatments:  PROM / STM / Oscillations-Mobs:  G-I, II, III, IV (PA's, Inf., Post.)  [x] (36363) Provided manual therapy to mobilize proximal hip and LS spine soft tissue/joints for the purpose of modulating pain, promoting relaxation,  increasing ROM, reducing/eliminating soft tissue swelling/inflammation/restriction, improving soft tissue extensibility and allowing for proper ROM for normal function with self care, mobility, lifting and ambulation. Modalities:   HP     Charges  Timed Code Treatment Minutes: 47   Total Treatment Minutes: 47     [] EVAL (LOW) 98285   [] EVAL (MOD) 48062   [] EVAL (HIGH) 02623   [] RE-EVAL     [x] RI(56812) x  1   [] IONTO  [] NMR (16092) x      [] VASO  [x] Manual (83515) x 2     [] Other:  [] TA x      [] Mech Traction (95511)  [] ES(attended) (34659)      [] ES (un) (61516):     Goals:   Patient stated goal: To reduce pain  [x]? Progressing: []? Met: []? Not Met: []? Adjusted     Therapist goals for Patient:   Short Term Goals: To be achieved in: 2 weeks  1. Independent in HEP and progression per patient tolerance, in order to prevent re-injury. []? Progressing: [x]? Met: []? Not Met: []? Adjusted  2. Patient will have a decrease in pain to facilitate improvement in movement, function, and ADLs as indicated by Functional Deficits. []? Progressing: [x]? Met: []? Not Met: []? Adjusted        Long Term Goals: To be achieved in: 6 weeks  1. Pt able to rotate through torso symmetrically bilaterally and WNL without increased pain or restriction. [x]? Progressing: []? Met: []? Not Met: []? Adjusted  2. Patient will be able to take short breaths, deep breaths and experience sneezing/coughing without increase pain or restriction. [x]? Progressing: []?  Met: []? Not Met: []? Adjusted  3. Patient will demonstrate an increase in Strength to good proximal shoulder, scapular and core activation to allow for proper functional mobility as indicated by patients Functional Deficits. []? Progressing: []? Met: []? Not Met: []? Adjusted  4. Patient will be able to push/pull without increased symptoms or restriction. [x]? Progressing: []? Met: []? Not Met: []? Adjusted  5. 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 [x]? Progressing: []? Met: []? Not Met: []? Adjusted         Progression Towards Functional goals:  [x] Patient is progressing as expected towards functional goals listed. [] Progression is slowed due to complexities listed. [] Progression has been slowed due to co-morbidities. [] Plan just implemented, too soon to assess goals progression  [] Other:     Overall Progression Towards Functional goals/ Treatment Progress Update:  [] Patient is progressing as expected towards functional goals listed. [] Progression is slowed due to complexities/Impairments listed. [] Progression has been slowed due to co-morbidities.   [x] Plan just implemented, too soon to assess goals progression <30days   [] Goals require adjustment due to lack of progress  [] Patient is not progressing as expected and requires additional follow up with physician  [] Other    Prognosis for POC: [x] Good [] Fair  [] Poor      Patient requires continued skilled intervention: [x] Yes  [] No    Treatment/Activity Tolerance:  [x] Patient able to complete treatment  [] Patient limited by fatigue  [] Patient limited by pain    [] Patient limited by other medical complications  [] Other:     ASSESSMENT: Pt continues to rely on manual therapy techniques to reduce pain and improve or even maintain her ROM but levels involved are becoming more localized to around T10 level. She is much better at her rib and diaphragm breathing as well as controlling thoracic ext and rib flare during arm motions. Return to Play: (if applicable)   []  Stage 1: Intro to Strength   []  Stage 2: Return to Run and Strength   []  Stage 3: Return to Jump and Strength   []  Stage 4: Dynamic Strength and Agility   []  Stage 5: Sport Specific Training     []  Ready to Return to Play, Meets All Above Stages   []  Not Ready for Return to Sports   Comments:                            PLAN: See eval. Pt to be seen 2 times a week for 6 weeks. [x] Continue per plan of care [] Alter current plan (see comments above)  [] Plan of care initiated [] Hold pending MD visit [] Discharge      Electronically signed by:  Basilio Park, PT, DPT, ATC, OCS [id number]      Note: If patient does not return for scheduled/ recommended follow up visits, this note will serve as a discharge from care along with most recent update on progress. Access Code: [email or web address]  URL: [email or web address] [email or web address]  Date: 04/15/2022  Prepared by: Basilio Fanluciano    Exercises  Prone Press Up - 1 x daily - 7 x weekly - 3 sets - 10 reps  Sidelying Open Book Thoracic Lumbar Rotation and Extension - 1 x daily - 7 x weekly - 3 sets - 10 reps  Quadruped Thoracic Rotation - Reach Under - 1 x daily - 7 x weekly - 3 sets - 10 reps  Supine Diaphragmatic Breathing - 1 x daily - 7 x weekly - 3 sets - 10 reps  Hooklying Rib Cage Breathing - 1 x daily - 7 x weekly - 3 sets - 10 reps  Supine Breathing with Caregiver Resistance at Ribcage - 1 x daily - 7 x weekly - 3 sets - 10 reps  Seated Diaphragmatic Breathing - 1 x daily - 7 x weekly - 3 sets - 10 reps  Winged Arm Breathing - 1 x daily - 7 x weekly - 3 sets - 10 reps    Access Code: [email or web address]  URL: [email or web address]  Date: 04/18/2022  Prepared by: Basilio Fanning    Exercises  Diaphragmatic Breathing in 90/90 - 1 x daily - 7 x weekly - 3 sets - 10 reps  Prone Press Up - 1 x daily - 7 x weekly - 3 sets - 10 reps  Cat-Camel - 1 x daily - 7 x weekly - 3 sets - 10 reps  Hip Hinge Rock Back - 1 x daily - 7 x weekly - 3 sets - 10 reps  Standing Anti-Rotation Press with Anchored Resistance - 1 x daily - 7 x weekly - 1-2 sets - 10 reps

## 2022-05-06 ENCOUNTER — HOSPITAL ENCOUNTER (OUTPATIENT)
Dept: PHYSICAL THERAPY | Age: 22
Setting detail: THERAPIES SERIES
Discharge: HOME | End: 2022-05-06
Payer: COMMERCIAL

## 2022-05-06 PROCEDURE — 97110 THERAPEUTIC EXERCISES: CPT

## 2022-05-06 PROCEDURE — 97140 MANUAL THERAPY 1/> REGIONS: CPT

## 2022-05-06 PROCEDURE — 97112 NEUROMUSCULAR REEDUCATION: CPT

## 2022-05-06 NOTE — FLOWSHEET NOTE
The [address] [place] [address],[address], [address]  Phone: [phone]  Fax [phone]    Physical Therapy Treatment Note/ Progress Report:           Date:  2022    Patient Name:  Darya Oakley    :  2000  MRN: [id number]  Restrictions/Precautions:    Medical/Treatment Diagnosis Information:  · Diagnosis: M54.6 L thoracic back pain, S29.012 strain of thoracic back  · Treatment Diagnosis: thoracic pain M54.6, M25.619 thoracic stiffness, R07.1 pain with breathing  Insurance/Certification information:  PT Insurance Information: [facility], 20 visits, auth needed - AIM  Physician Information:  Referring Practitioner: Dr. Christy Adames  Has the plan of care been signed (Y/N):        []  Yes  [x]  No     Date of Patient follow up with Physician: as needed      Is this a Progress Report:     []  Yes  [x]  No        If Yes:  Date Range for reporting period:  Beginning 22  Ending     Progress report will be due (10 Rx or 30 days whichever is less):       Recertification will be due (POC Duration  / 90 days whichever is less): 22       Visit # Insurance Allowable Auth Required   In-person ,  3/5 20 visits (8 used) [x]  Yes - AIM []  No    Telehealth - - []  Yes []  No    Total      * 5 visits approved 22 - 6/10/22  Approved another 5 visits  - 22      Functional Scale: Dancer functional Outcome Survey:  = 93% ability, 7% disability       Date assessed: 22        Number of Comorbidities:  []0     [x]1-2    []3+    Latex Allergy:  [x]NO      []YES  Preferred Language for Healthcare:   [x]English       []other:      Pain level:  2/10    SUBJECTIVE:  Pt says her last two days schedule hasn't been all that busy so she feels like she is stil feeling pretty good from last session and overall a big progress since eval.       OBJECTIVE:   Observation: L rotation 90% without pain, R rotation 95% no pain, T10 hypomobile with central P-A and with L transverse P-A, L paraspinal mild spasm and tenderness still present   Test measurements:       RESTRICTIONS/PRECAUTIONS:     Exercises/Interventions:     Therapeutic Ex (78771) Sets/Reps/sec Notes/CUES   Self mobilization: mid-thoracic R rotation with extension  cavitation        Prone Press up into L rot    Sidelying Open books to L rot    Thread the needle  NMR Focus on segmental control   Standing scap row Cued no rib flare   Scap retract B horiz abd (2nd position)  \"   Step up, high march, B GH ext  \"   Kneeling abdominal D1 and D2 red 1x10ea R/L Cued on breathing   Ref: arm lowering  Ref: arm add  Ref: arm circles 1R1B 1x10  1R1B 1x10  1R1B 1x5ea              Pt edu: HEP walk through, Performing HEP in order Idea of making diaphragmatic breathing automatic and need for neuromuscular training to make it a habit     Manual Intervention (09015) Total 25 min    Thoracolumbar roll (mid-thoracic) R rotation  Left sidelying   Seated mid-thoracic distraction/ext/L rot   Multiple cavitations   Prone thoracolumbar rotation from ASIS 2 min cavitation   Prone T3-T6 central PA 2 min cavitation   Prone generalized rib P-A with twist 1 min    Prone T3-T6 R rotation P-A      Prone T5-T8 R uPA MWM into PPU     L rib 7 facet P-A     STM: thoracic parasinals, L mid & low trap, L QL, L glutes 20 min    Cupping: thoracolumbar paraspinals L>R                    NMR re-education (66059)  CUES NEEDED   Supine Diaphragmatic breathing series    Diaphragmatic breathing   Breathing with rib expansion RTB  Breathing with TA activation  Breathing with LE in 90/90             Emphasis on diaphragm first, and filling posterior ribs   Seated Diaphragmatic breathing series    Diaphragmatic breathing  Breathing with rib expansion RTB  Breathing with no monies RTB          Emphasis on diaphragm first, and filling posterior ribs        Segmental Prone Press Up  Segmental Cat-Cow  Quadruped rockback  S/L \"open book\" rib rotation      Emphasis on controlled segmental movement, cues for decreased rib flare        Paloff Press with breathing      TB rotation  Breath out with press out, breath in with arms in   Supine on FR:   Diaphragmatic breathing  Marching (arms on ground)  Arm angels  Alternating arm flexion   1x10     Avoiding rib flare/ excessive thoracic extension  NV inc speed   Seated tball march & UE Yellow x10    Port de bras arm with tband Yellow x10 Sitting on tball   Upper cut slides up wall without rib flare 2x10              Therapeutic Activity (75228)                              MHP with IFC  (through AT services provided by [facility])       Therapeutic Exercise and NMR EXR  [x] (94549) Provided verbal/tactile cueing for activities related to strengthening, flexibility, endurance, ROM  for improvements in proximal hip and core control with self care, mobility, lifting and ambulation. [x] (02757) Provided verbal/tactile cueing for activities related to improving balance, coordination, kinesthetic sense, posture, motor skill, proprioception  to assist with core control in self care, mobility, lifting, and ambulation.      Therapeutic Activities:    [] (52707 or 97548) Provided verbal/tactile cueing for activities related to improving balance, coordination, kinesthetic sense, posture, motor skill, proprioception and motor activation to allow for proper function  with self care and ADLs  [] (37807) Provided training and instruction to the patient for proper core and proximal hip recruitment and positioning with ambulation re-education     Home Exercise Program:    [] (62420) Reviewed/Progressed HEP activities related to strengthening, flexibility, endurance, ROM of core, proximal hip and LE for functional self-care, mobility, lifting and ambulation   [] (53416) Reviewed/Progressed HEP activities related to improving balance, coordination, kinesthetic sense, posture, motor skill, proprioception of core, proximal hip and LE for self care, mobility, lifting, and ambulation      Manual Treatments:  PROM / STM / Oscillations-Mobs:  G-I, II, III, IV (PA's, Inf., Post.)  [x] (49653) Provided manual therapy to mobilize proximal hip and LS spine soft tissue/joints for the purpose of modulating pain, promoting relaxation,  increasing ROM, reducing/eliminating soft tissue swelling/inflammation/restriction, improving soft tissue extensibility and allowing for proper ROM for normal function with self care, mobility, lifting and ambulation. Modalities:         Charges  Timed Code Treatment Minutes: 53   Total Treatment Minutes: 53     [] EVAL (LOW) 86983   [] EVAL (MOD) 65611   [] EVAL (HIGH) 29496   [] RE-EVAL     [x] JM(85042) x  1   [] IONTO  [x] NMR (63732) x1      [] VASO  [x] Manual (05848) x 2     [] Other:  [] TA x      [] Mech Traction (56058)  [] ES(attended) (78547)      [] ES (un) (36251):     Goals:   Patient stated goal: To reduce pain  [x]? Progressing: []? Met: []? Not Met: []? Adjusted     Therapist goals for Patient:   Short Term Goals: To be achieved in: 2 weeks  1. Independent in HEP and progression per patient tolerance, in order to prevent re-injury. []? Progressing: [x]? Met: []? Not Met: []? Adjusted  2. Patient will have a decrease in pain to facilitate improvement in movement, function, and ADLs as indicated by Functional Deficits. []? Progressing: [x]? Met: []? Not Met: []? Adjusted      Long Term Goals: To be achieved in: 6 weeks  1. Pt able to rotate through torso symmetrically bilaterally and WNL without increased pain or restriction. [x]? Progressing: []? Met: []? Not Met: []? Adjusted  2. Patient will be able to take short breaths, deep breaths and experience sneezing/coughing without increase pain or restriction. [x]? Progressing: []? Met: []? Not Met: []? Adjusted  3.  Patient will demonstrate an increase in Strength to good proximal shoulder, scapular and core activation to allow for proper functional mobility as indicated by patients Functional Deficits. []? Progressing: []? Met: []? Not Met: []? Adjusted  4. Patient will be able to push/pull without increased symptoms or restriction. [x]? Progressing: []? Met: []? Not Met: []? Adjusted  5. 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 [x]? Progressing: []? Met: []? Not Met: []? Adjusted         Progression Towards Functional goals:  [x] Patient is progressing as expected towards functional goals listed. [] Progression is slowed due to complexities listed. [] Progression has been slowed due to co-morbidities. [] Plan just implemented, too soon to assess goals progression  [] Other:     Overall Progression Towards Functional goals/ Treatment Progress Update:  [] Patient is progressing as expected towards functional goals listed. [] Progression is slowed due to complexities/Impairments listed. [] Progression has been slowed due to co-morbidities. [x] Plan just implemented, too soon to assess goals progression <30days   [] Goals require adjustment due to lack of progress  [] Patient is not progressing as expected and requires additional follow up with physician  [] Other    Prognosis for POC: [x] Good [] Fair  [] Poor      Patient requires continued skilled intervention: [x] Yes  [] No    Treatment/Activity Tolerance:  [x] Patient able to complete treatment  [] Patient limited by fatigue  [] Patient limited by pain    [] Patient limited by other medical complications  [] Other:     ASSESSMENT: Pt's alignment in spinal segments much improved today but still got a few cavitations at the thoracolumbar junction. Most remaining restriction seems muscular in nature at this point.  Began discussing POC progression with pt that now that pain is reduced, ROM is normalizing, and her strength is improving the final phase is to focus on her ability to use her stabilizers muscles without \"locking down\" her motion so she has freedom of motion which properly supporting herself and breathing. Return to Play: (if applicable)   []  Stage 1: Intro to Strength   []  Stage 2: Return to Run and Strength   []  Stage 3: Return to Jump and Strength   []  Stage 4: Dynamic Strength and Agility   []  Stage 5: Sport Specific Training     []  Ready to Return to Play, Meets All Above Stages   []  Not Ready for Return to Sports   Comments:                            PLAN: See eval. Pt to be seen 2 times a week for 6 weeks. [x] Continue per plan of care [] Alter current plan (see comments above)  [] Plan of care initiated [] Hold pending MD visit [] Discharge      Electronically signed by:  Fawn Santiago, PT, DPT, ATC, OCS [id number]      Note: If patient does not return for scheduled/ recommended follow up visits, this note will serve as a discharge from care along with most recent update on progress. Access Code: [email or web address]  URL: [email or web address]  Date: 04/15/2022  Prepared by: Fawn Santiago    Exercises  Prone Press Up - 1 x daily - 7 x weekly - 3 sets - 10 reps  Sidelying Open Book Thoracic Lumbar Rotation and Extension - 1 x daily - 7 x weekly - 3 sets - 10 reps  Quadruped Thoracic Rotation - Reach Under - 1 x daily - 7 x weekly - 3 sets - 10 reps  Supine Diaphragmatic Breathing - 1 x daily - 7 x weekly - 3 sets - 10 reps  Hooklying Rib Cage Breathing - 1 x daily - 7 x weekly - 3 sets - 10 reps  Supine Breathing with Caregiver Resistance at Ribcage - 1 x daily - 7 x weekly - 3 sets - 10 reps  Seated Diaphragmatic Breathing - 1 x daily - 7 x weekly - 3 sets - 10 reps  Winged Arm Breathing - 1 x daily - 7 x weekly - 3 sets - 10 reps    Access Code: [email or web address]  URL: [email or web address]  Date: 04/18/2022  Prepared by: Fawn Santiago    Exercises  Diaphragmatic Breathing in 90/90 - 1 x daily - 7 x weekly - 3 sets - 10 reps  Prone Press Up - 1 x daily - 7 x weekly - 3 sets - 10 reps  Cat-Camel - 1 x daily - 7 x weekly - 3 sets - 10 reps  Hip Hinge Rock Back - 1 x daily - 7 x weekly - 3 sets - 10 reps  Standing Anti-Rotation Press with Anchored Resistance - 1 x daily - 7 x weekly - 1-2 sets - 10 reps

## 2022-05-09 ENCOUNTER — HOSPITAL ENCOUNTER (OUTPATIENT)
Dept: PHYSICAL THERAPY | Age: 22
Setting detail: THERAPIES SERIES
Discharge: HOME | End: 2022-05-09
Payer: COMMERCIAL

## 2022-05-09 PROCEDURE — 97140 MANUAL THERAPY 1/> REGIONS: CPT

## 2022-05-09 PROCEDURE — 97112 NEUROMUSCULAR REEDUCATION: CPT

## 2022-05-09 PROCEDURE — 97110 THERAPEUTIC EXERCISES: CPT

## 2022-05-09 NOTE — FLOWSHEET NOTE
The [address] [place] [address],[address]  Phone: [phone]  Fax [phone]    Physical Therapy Treatment Note/ Progress Report:           Date:  2022    Patient Name:  Maria Esther Tovar    :  2000  MRN: [id number]  Restrictions/Precautions:    Medical/Treatment Diagnosis Information:  · Diagnosis: M54.6 L thoracic back pain, S29.012 strain of thoracic back  · Treatment Diagnosis: thoracic pain M54.6, M25.619 thoracic stiffness, R07.1 pain with breathing  Insurance/Certification information:  PT Insurance Information: Joel Emmanuel, 20 visits, auth needed - AIM  Physician Information:  Referring Practitioner: Dr. Anuradha Fish  Has the plan of care been signed (Y/N):        []  Yes  [x]  No     Date of Patient follow up with Physician: as needed      Is this a Progress Report:     []  Yes  [x]  No        If Yes:  Date Range for reporting period:  Beginning 22  Ending     Progress report will be due (10 Rx or 30 days whichever is less): 31      Recertification will be due (POC Duration  / 90 days whichever is less): 22       Visit # Insurance Allowable Auth Required   In-person ,   20 visits (8 used) [x]  Yes - AIM []  No    Telehealth - - []  Yes []  No    Total      * 5 visits approved 22 - 6/10/22  Approved another 5 visits  - 22      Functional Scale: Dancer functional Outcome Survey:  = 93% ability, 7% disability       Date assessed: 22        Number of Comorbidities:  []0     [x]1-2    []3+    Latex Allergy:  [x]NO      []YES  Preferred Language for Healthcare:   [x]English       []other:      Pain level:  1/10    SUBJECTIVE:  Pt says she has felt pretty good since last session and feels she has maintained her ROM. She had the weekend off and was able to get some good rest and feels that has helped too. She can now fully twist and breath without pain.  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        OBJECTIVE:   Observation: L rotation 90% without pain, R rotation 95% no pain, T10 hypomobile with central P-A and with L transverse P-A, L paraspinal mild spasm and tenderness still present   Test measurements:       RESTRICTIONS/PRECAUTIONS:     Exercises/Interventions:     Therapeutic Ex (20301) Sets/Reps/sec Notes/CUES   Self mobilization: mid-thoracic R rotation with extension  cavitation        Prone Press up into L rot    Sidelying Open books to L rot x5    Thread the needle  R/L x5 NMR Focus on segmental control   Standing scap row Cued no rib flare   Scap retract B horiz abd (2nd position)  \"   Step up, high march, B GH ext  \"   Kneeling abdominal D1 and D2 red 2x10ea R/L Cued on breathing   Ref: arm lowering  Ref: arm add  Ref: arm circles 1R1B 1x10  1R1B 1x10  1R1B 1x5ea              Pt edu: HEP walk through, Performing HEP in order Idea of making diaphragmatic breathing automatic and need for neuromuscular training to make it a habit     Manual Intervention (14700) Total 17 min    Thoracolumbar roll (mid-thoracic) R rotation  Left sidelying   Seated mid-thoracic distraction/ext/L rot   Multiple cavitations   Prone thoracolumbar rotation from ASIS  cavitation   Prone T3-T6 central PA 1 min    Prone generalized rib P-A with twist 1 min    Prone T3-T6 R rotation P-A      Prone T5-T8 R uPA MWM into PPU     L rib 7 facet P-A     STM: thoracic parasinals, L mid & low trap, L QL, L glutes 10 min    Cupping: thoracolumbar paraspinals L>R 5 min                   NMR re-education (36420)  CUES NEEDED   Supine Diaphragmatic breathing series    Diaphragmatic breathing   Breathing with rib expansion RTB  Breathing with TA activation  Breathing with LE in 90/90             Emphasis on diaphragm first, and filling posterior ribs   Seated Diaphragmatic breathing series    Diaphragmatic breathing  Breathing with rib expansion RTB  Breathing with no monies RTB          Emphasis on diaphragm first, and filling posterior ribs        Segmental Prone Press Up  Segmental Cat-Cow  Quadruped rockback  S/L \"open book\" rib rotation     1x10   Emphasis on controlled segmental movement, cues for decreased rib flare        Paloff Press with breathing      TB rotation  Breath out with press out, breath in with arms in   Supine on FR:   Diaphragmatic breathing  Marching (arms on ground)  Arm angels  Alternating arm flexion   1x10  1x10  1x10   Avoiding rib flare/ excessive thoracic extension     Seated tball march & UE Yellow 2x10    Port de bras arm with tband Yellow 2 x10 Sitting on tball   Upper cut slides up wall without rib flare 2x10              Therapeutic Activity (95057)                              MHP with IFC  (through AT services provided by [facility])       Therapeutic Exercise and NMR EXR  [x] (08864) Provided verbal/tactile cueing for activities related to strengthening, flexibility, endurance, ROM  for improvements in proximal hip and core control with self care, mobility, lifting and ambulation. [x] (89067) Provided verbal/tactile cueing for activities related to improving balance, coordination, kinesthetic sense, posture, motor skill, proprioception  to assist with core control in self care, mobility, lifting, and ambulation.      Therapeutic Activities:    [] (61082 or 85919) Provided verbal/tactile cueing for activities related to improving balance, coordination, kinesthetic sense, posture, motor skill, proprioception and motor activation to allow for proper function  with self care and ADLs  [] (82436) Provided training and instruction to the patient for proper core and proximal hip recruitment and positioning with ambulation re-education     Home Exercise Program:    [] (35384) Reviewed/Progressed HEP activities related to strengthening, flexibility, endurance, ROM of core, proximal hip and LE for functional self-care, mobility, lifting and ambulation   [] (19339) Reviewed/Progressed HEP activities related to improving balance, coordination, kinesthetic sense, posture, motor skill, proprioception of core, proximal hip and LE for self care, mobility, lifting, and ambulation      Manual Treatments:  PROM / STM / Oscillations-Mobs:  G-I, II, III, IV (PA's, Inf., Post.)  [x] (01263) Provided manual therapy to mobilize proximal hip and LS spine soft tissue/joints for the purpose of modulating pain, promoting relaxation,  increasing ROM, reducing/eliminating soft tissue swelling/inflammation/restriction, improving soft tissue extensibility and allowing for proper ROM for normal function with self care, mobility, lifting and ambulation. Modalities:         Charges  Timed Code Treatment Minutes: 60   Total Treatment Minutes: 60     [] EVAL (LOW) 15791   [] EVAL (MOD) 60157   [] EVAL (HIGH) 59936   [] RE-EVAL     [x] AG(61926) x  2  [] IONTO  [x] NMR (72739) x1      [] VASO  [x] Manual (41958) x 1     [] Other:  [] TA x      [] Mech Traction (32460)  [] ES(attended) (71283)      [] ES (un) (01511):     Goals:   Patient stated goal: To reduce pain  [x]? Progressing: []? Met: []? Not Met: []? Adjusted     Therapist goals for Patient:   Short Term Goals: To be achieved in: 2 weeks  1. Independent in HEP and progression per patient tolerance, in order to prevent re-injury. []? Progressing: [x]? Met: []? Not Met: []? Adjusted  2. Patient will have a decrease in pain to facilitate improvement in movement, function, and ADLs as indicated by Functional Deficits. []? Progressing: [x]? Met: []? Not Met: []? Adjusted      Long Term Goals: To be achieved in: 6 weeks  1. Pt able to rotate through torso symmetrically bilaterally and WNL without increased pain or restriction. [x]? Progressing: []? Met: []? Not Met: []? Adjusted  2.  Patient will be able to take short breaths, deep breaths and experience sneezing/coughing without increase pain or restriction. [x]? Progressing: []? Met: []? Not Met: []? Adjusted  3. Patient will demonstrate an increase in Strength to good proximal shoulder, scapular and core activation to allow for proper functional mobility as indicated by patients Functional Deficits. []? Progressing: []? Met: []? Not Met: []? Adjusted  4. Patient will be able to push/pull without increased symptoms or restriction. [x]? Progressing: []? Met: []? Not Met: []? Adjusted  5. 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 [x]? Progressing: []? Met: []? Not Met: []? Adjusted         Progression Towards Functional goals:  [x] Patient is progressing as expected towards functional goals listed. [] Progression is slowed due to complexities listed. [] Progression has been slowed due to co-morbidities. [] Plan just implemented, too soon to assess goals progression  [] Other:     Overall Progression Towards Functional goals/ Treatment Progress Update:  [] Patient is progressing as expected towards functional goals listed. [] Progression is slowed due to complexities/Impairments listed. [] Progression has been slowed due to co-morbidities.   [x] Plan just implemented, too soon to assess goals progression <30days   [] Goals require adjustment due to lack of progress  [] Patient is not progressing as expected and requires additional follow up with physician  [] Other    Prognosis for POC: [x] Good [] Fair  [] Poor      Patient requires continued skilled intervention: [x] Yes  [] No    Treatment/Activity Tolerance:  [x] Patient able to complete treatment  [] Patient limited by fatigue  [] Patient limited by pain    [] Patient limited by other medical complications  [] Other:     ASSESSMENT: For the first time pt started will full pain free rotation and did not have any cavitation with thoracic mobilization assessment. She was also able to tolerated doing more there ex today and required less cueing for use of stabilizers and use of breath. Return to Play: (if applicable)   []  Stage 1: Intro to Strength   []  Stage 2: Return to Run and Strength   []  Stage 3: Return to Jump and Strength   []  Stage 4: Dynamic Strength and Agility   []  Stage 5: Sport Specific Training     []  Ready to Return to Play, Meets All Above Stages   []  Not Ready for Return to Sports   Comments:                            PLAN: See eval. Pt to be seen 2 times a week for 6 weeks. [x] Continue per plan of care [] Alter current plan (see comments above)  [] Plan of care initiated [] Hold pending MD visit [] Discharge      Electronically signed by:  Fawn Santiago, PT, DPT, ATC, OCS [id number]      Note: If patient does not return for scheduled/ recommended follow up visits, this note will serve as a discharge from care along with most recent update on progress. Access Code: [email or web address]  URL: [email or web address]  Date: 04/15/2022  Prepared by: Fawn Santiago    Exercises  Prone Press Up - 1 x daily - 7 x weekly - 3 sets - 10 reps  Sidelying Open Book Thoracic Lumbar Rotation and Extension - 1 x daily - 7 x weekly - 3 sets - 10 reps  Quadruped Thoracic Rotation - Reach Under - 1 x daily - 7 x weekly - 3 sets - 10 reps  Supine Diaphragmatic Breathing - 1 x daily - 7 x weekly - 3 sets - 10 reps  Hooklying Rib Cage Breathing - 1 x daily - 7 x weekly - 3 sets - 10 reps  Supine Breathing with Caregiver Resistance at Ribcage - 1 x daily - 7 x weekly - 3 sets - 10 reps  Seated Diaphragmatic Breathing - 1 x daily - 7 x weekly - 3 sets - 10 reps  Winged Arm Breathing - 1 x daily - 7 x weekly - 3 sets - 10 reps    Access Code: [email or web address]  URL: [email or web address]  Date: 04/18/2022  Prepared by: Fawn Santiago    Exercises  Diaphragmatic Breathing in 90/90 - 1 x daily - 7 x weekly - 3 sets - 10 reps  Prone Press Up - 1 x daily - 7 x weekly - 3 sets - 10 reps  Cat-Camel - 1 x daily - 7 x weekly - 3 sets - 10 reps  Hip Hinge Rock Back - 1 x daily - 7 x weekly - 3 sets - 10 reps  Standing Anti-Rotation Press with Anchored Resistance - 1 x daily - 7 x weekly - 1-2 sets - 10 reps

## 2022-05-11 ENCOUNTER — HOSPITAL ENCOUNTER (OUTPATIENT)
Dept: PHYSICAL THERAPY | Age: 22
Setting detail: THERAPIES SERIES
Discharge: HOME | End: 2022-05-11
Payer: COMMERCIAL

## 2022-05-11 PROCEDURE — 97112 NEUROMUSCULAR REEDUCATION: CPT

## 2022-05-11 PROCEDURE — 97140 MANUAL THERAPY 1/> REGIONS: CPT

## 2022-05-11 PROCEDURE — 97110 THERAPEUTIC EXERCISES: CPT

## 2022-05-11 NOTE — PROGRESS NOTES
[facility] [address], [address], [address]  Phone: [phone]  Fax [phone]    Physical Therapy Treatment Note/ Progress Report:           Date:  2022    Patient Name:  Sarina Gray    :  2000  MRN: [id number]  Restrictions/Precautions:    Medical/Treatment Diagnosis Information:  · Diagnosis: M54.6 L thoracic back pain, S29.012 strain of thoracic back  · Treatment Diagnosis: thoracic pain M54.6, M25.619 thoracic stiffness, R07.1 pain with breathing  Insurance/Certification information:  PT Insurance Information: Zuri Lugo, 20 visits, auth needed - AIM  Physician Information:  Referring Practitioner: Dr. Estefanía Dior  Has the plan of care been signed (Y/N):        []  Yes  [x]  No     Date of Patient follow up with Physician: as needed       Is this a Progress Report:     [x]  Yes  []  No        If Yes:  Date Range for reporting period:  Beginning 22  Ending 22    Progress report will be due (10 Rx or 30 days whichever is less):       Recertification will be due (POC Duration  / 90 days whichever is less): 22       Visit # Insurance Allowable Auth Required   In-person ,   20 visits (8 used) [x]  Yes - AIM []  No    Telehealth - - []  Yes []  No    Total      * 5 visits approved 22 - 6/10/22  Approved another 5 visits  - 22      Functional Scale: Dancer functional Outcome Survey:  = 93% ability, 7% disability       Date assessed: 22        Number of Comorbidities:  []0     [x]1-2    []3+    Latex Allergy:  [x]NO      []YES  Preferred Language for Healthcare:   [x]English       []other:      Pain level:  0/10    SUBJECTIVE:  Pt says she feels about 95% improved overall.  Pt can now breath unrestricted and without pain, pt feels more stable in her torso without being :locked up and in spasm\", no longer has tenderness along spine allowing more comfort with sleeping, and can now sit in any position without pain, and can now rotate through torso such as when driving without pain or restriction. Pt does still feel some pain, limitation and minor spasm with thoracic extension and to the sides but mostly to the left and when pushing through arms in like a plank position still hurts. 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       OBJECTIVE:   Observation: L paraspinal still higher tone than the R with trigger points at level T3-4, T6, T10, overall much improved spinal segment mobility compared to eval, today just level T4 hypomobile   Test measurements:     OBJECTIVE:      ROM LEFT  EVAL RIGHT  EVAL Left  5/11/22 Right  5/11/22   Thoracic flex 100%    100%   Thoracic extension 75% pain   100%   Thoracic side bending 50% pain 90% 100%  Pt reports tightness 100%   Thoracic rotation 25% pain 75% mild pain 100% 100%   LUMBAR FLEX 100%   100%   LUMBAR %   100%   Sidebend 80% 100% 100% 100%   Rotation 100% 100% 100% 100%   Strength  LEFT  EVAL RIGHT  EVAL Left  5/11/22 Right  5/11/22   Shoulder flexion 4+/5 4+/5 5-/5 5-/5   Shoulder abd 4+/5 mild pain 4+/5 5/5 5/5   Shoulder add 4+/5 mild pain 4+/5 5/5 5/5   Shoulder ER 4/5 4/5 4/5 4/5   Shoulder IR 4/5 5/5 5-/5 5/5   Mid-trap/ Rhomboids 4+/5 pain 5-/5 5/5 5/5   Low trap 4-/5 pain 4/5 5/5 5/5   Upper trap 5/5      Serratus Anterior        MfA WNL WNL     TrA WNl WNL       RESTRICTIONS/PRECAUTIONS:     Exercises/Interventions:     Therapeutic Ex (85273) Sets/Reps/sec Notes/CUES   Self mobilization: mid-thoracic R rotation with extension  cavitation        Prone Press up into L rot    Sidelying Open books to L rot x5    Thread the needle  R/L x5 NMR Focus on segmental control   Standing scap row Cued no rib flare   Scap retract B horiz abd (2nd position)  \"   Step up, high march, B GH ext  \"   Kneeling abdominal D1 and D2 red 2x10ea R/L Cued on breathing Ref: arm lowering  Ref: arm add  Ref: arm circles 1R1B 1x10  1R1B 1x10  1R1B 1x5ea    B GH ER Green 2x15 Cued for thoracic neutral        Pt edu: HEP walk through, Performing HEP in order Idea of making diaphragmatic breathing automatic and need for neuromuscular training to make it a habit     Manual Intervention ([phone]) Total 19 min    Thoracolumbar roll (mid-thoracic) R rotation  Left sidelying   Seated mid-thoracic distraction/ext/L rot  gd V 2 min cavitations   Prone thoracolumbar rotation from ASIS  cavitation   Prone T3-T6 central PA 1 min    Prone generalized rib P-A with twist 1 min caviation   Prone T3-T6 R rotation P-A      Prone T5-T8 R uPA MWM into PPU     L rib 7 facet P-A     STM: thoracic parasinals, L mid & low trap, L QL, L glutes 10 min    Cupping: thoracolumbar paraspinals L>R 5 min                   NMR re-education (19994)  CUES NEEDED   Supine Diaphragmatic breathing series    Diaphragmatic breathing   Breathing with rib expansion RTB  Breathing with TA activation  Breathing with LE in 90/90             Emphasis on diaphragm first, and filling posterior ribs   Seated Diaphragmatic breathing series    Diaphragmatic breathing  Breathing with rib expansion RTB  Breathing with no monies RTB          Emphasis on diaphragm first, and filling posterior ribs        Segmental Prone Press Up  Segmental Cat-Cow  Quadruped rockback  S/L \"open book\" rib rotation     1x10   Emphasis on controlled segmental movement, cues for decreased rib flare        Paloff Press with breathing      TB rotation  Breath out with press out, breath in with arms in   Supine on FR:   Diaphragmatic breathing  Marching (arms on ground)  Arm angels  Alternating arm flexion   1x10  1x10  1x10   Avoiding rib flare/ excessive thoracic extension     Seated tball march & UE Yellow 2x10    Port de bras arm with tband Yellow 2 x10 Sitting on tball   Upper cut slides up wall without rib flare 2x10              Therapeutic Activity (69419)                              MHP with IFC  (through AT services provided by [facility])       Therapeutic Exercise and NMR EXR  [x] (78191) Provided verbal/tactile cueing for activities related to strengthening, flexibility, endurance, ROM  for improvements in proximal hip and core control with self care, mobility, lifting and ambulation. [x] (60939) Provided verbal/tactile cueing for activities related to improving balance, coordination, kinesthetic sense, posture, motor skill, proprioception  to assist with core control in self care, mobility, lifting, and ambulation. Therapeutic Activities:    [] (26137 or 76609) Provided verbal/tactile cueing for activities related to improving balance, coordination, kinesthetic sense, posture, motor skill, proprioception and motor activation to allow for proper function  with self care and ADLs  [] (32696) Provided training and instruction to the patient for proper core and proximal hip recruitment and positioning with ambulation re-education     Home Exercise Program:    [x] (68837) Reviewed/Progressed HEP activities related to strengthening, flexibility, endurance, ROM of core, proximal hip and LE for functional self-care, mobility, lifting and ambulation   [] (27912) Reviewed/Progressed HEP activities related to improving balance, coordination, kinesthetic sense, posture, motor skill, proprioception of core, proximal hip and LE for self care, mobility, lifting, and ambulation      Manual Treatments:  PROM / STM / Oscillations-Mobs:  G-I, II, III, IV (PA's, Inf., Post.)  [x] (90568) Provided manual therapy to mobilize proximal hip and LS spine soft tissue/joints for the purpose of modulating pain, promoting relaxation,  increasing ROM, reducing/eliminating soft tissue swelling/inflammation/restriction, improving soft tissue extensibility and allowing for proper ROM for normal function with self care, mobility, lifting and ambulation.      Modalities: Charges  Timed Code Treatment Minutes: 60   Total Treatment Minutes: 60     [] EVAL (LOW) 43140   [] EVAL (MOD) 74572   [] EVAL (HIGH) 42456   [] RE-EVAL     [x] TS(91243) x  2  [] IONTO  [x] NMR (23475) x1      [] VASO  [x] Manual (06392) x 1     [] Other:  [] TA x      [] Mech Traction (56881)  [] ES(attended) (63835)      [] ES (un) (10611):     Goals:   Patient stated goal: To reduce pain  [x]? Progressing: []? Met: []? Not Met: []? Adjusted     Therapist goals for Patient:   Short Term Goals: To be achieved in: 2 weeks  1. Independent in HEP and progression per patient tolerance, in order to prevent re-injury. []? Progressing: [x]? Met: []? Not Met: []? Adjusted  2. Patient will have a decrease in pain to facilitate improvement in movement, function, and ADLs as indicated by Functional Deficits. []? Progressing: [x]? Met: []? Not Met: []? Adjusted      Long Term Goals: To be achieved in: 6 weeks  1. Pt able to rotate through torso symmetrically bilaterally and WNL without increased pain or restriction. []? Progressing: [x]? Met: []? Not Met: []? Adjusted  2. Patient will be able to take short breaths, deep breaths and experience sneezing/coughing without increase pain or restriction. []? Progressing: [x]? Met: []? Not Met: []? Adjusted  3. Patient will demonstrate an increase in Strength to good proximal shoulder, scapular and core activation to allow for proper functional mobility as indicated by patients Functional Deficits. []? Progressing: [x]? Met: []? Not Met: []? Adjusted  4. Patient will be able to push/pull without increased symptoms or restriction. [x]? Progressing: []? Met: []? Not Met: []? Adjusted  5. 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 [x]? Progressing: []? Met: []? Not Met: []?  Adjusted Progression Towards Functional goals:  [x] Patient is progressing as expected towards functional goals listed. [] Progression is slowed due to complexities listed. [] Progression has been slowed due to co-morbidities. [] Plan just implemented, too soon to assess goals progression  [] Other:     Overall Progression Towards Functional goals/ Treatment Progress Update:  [x] Patient is progressing as expected towards functional goals listed. [] Progression is slowed due to complexities/Impairments listed. [] Progression has been slowed due to co-morbidities. [] Plan just implemented, too soon to assess goals progression <30days   [] Goals require adjustment due to lack of progress  [] Patient is not progressing as expected and requires additional follow up with physician  [] Other    Prognosis for POC: [x] Good [] Fair  [] Poor      Patient requires continued skilled intervention: [x] Yes  [] No    Treatment/Activity Tolerance:  [x] Patient able to complete treatment  [] Patient limited by fatigue  [] Patient limited by pain    [] Patient limited by other medical complications  [] Other:     ASSESSMENT: Pt has done very well with PT. She has greatly reduced pain and spasm improved/normalized thoracic spine segmental and rib facet mobility. Pt is now able to perform more natural diaphragm breathing rather than shallow upper chest breathing, able to control neutral rib cage during spine and UE movements and force generation rather than rigidly extending and flaring rib cage. Pt is returning to all ADLs and dancing with little restriction at this point. 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 In order to accomodate this I will be requesting another 2 visits.       Return to Play: (if applicable)   []  Stage 1: Intro to Strength   []  Stage 2: Return to Run and Strength   []  Stage 3: Return to Jump and Strength   []  Stage 4: Dynamic Strength and Agility   []  Stage 5: Sport Specific Training     [x]  Ready to Return to Play, Meets All Above Stages   []  Not Ready for Return to Sports   Comments:                            PLAN: See eval. Pt to be seen 1 times a week for 2 weeks which would follow original POC of 6 weeks of treatment needed. [x] Continue per plan of care [x] Alter current plan (see comments above)  [] Plan of care initiated [] Hold pending MD visit [] Discharge      Electronically signed by:  Frankey Kay, PT, DPT, ATC, OCS [id number]      Note: If patient does not return for scheduled/ recommended follow up visits, this note will serve as a discharge from care along with most recent update on progress. Access Code: [email or web address]  URL: [email or web address]  Date: 04/15/2022  Prepared by: Frankey Kay    Exercises  Prone Press Up - 1 x daily - 7 x weekly - 3 sets - 10 reps  Sidelying Open Book Thoracic Lumbar Rotation and Extension - 1 x daily - 7 x weekly - 3 sets - 10 reps  Quadruped Thoracic Rotation - Reach Under - 1 x daily - 7 x weekly - 3 sets - 10 reps  Supine Diaphragmatic Breathing - 1 x daily - 7 x weekly - 3 sets - 10 reps  Hooklying Rib Cage Breathing - 1 x daily - 7 x weekly - 3 sets - 10 reps  Supine Breathing with Caregiver Resistance at Ribcage - 1 x daily - 7 x weekly - 3 sets - 10 reps  Seated Diaphragmatic Breathing - 1 x daily - 7 x weekly - 3 sets - 10 reps  Winged Arm Breathing - 1 x daily - 7 x weekly - 3 sets - 10 reps    Access Code: [email or web address]  URL: [email or web address]  Date: 04/18/2022  Prepared by: Frankey Kay    Exercises  Diaphragmatic Breathing in 90/90 - 1 x daily - 7 x weekly - 3 sets - 10 reps  Prone Press Up - 1 x daily - 7 x weekly - 3 sets - 10 reps  Cat-Camel - 1 x daily - 7 x weekly - 3 sets - 10 reps  Hip Hinge Rock Back - 1 x daily - 7 x weekly - 3 sets - 10 reps  Standing Anti-Rotation Press with Anchored Resistance - 1 x daily - 7 x weekly - 1-2 sets - 10 reps spouse

## 2022-05-16 ENCOUNTER — HOSPITAL ENCOUNTER (OUTPATIENT)
Dept: PHYSICAL THERAPY | Age: 22
Setting detail: THERAPIES SERIES
Discharge: HOME | End: 2022-05-16
Payer: COMMERCIAL

## 2022-05-16 PROCEDURE — 97112 NEUROMUSCULAR REEDUCATION: CPT

## 2022-05-16 PROCEDURE — 97140 MANUAL THERAPY 1/> REGIONS: CPT

## 2022-05-16 PROCEDURE — 97110 THERAPEUTIC EXERCISES: CPT

## 2022-05-23 ENCOUNTER — TELEPHONE (OUTPATIENT)
Dept: FAMILY MEDICINE CLINIC | Age: 22
End: 2022-05-23

## 2022-05-23 ENCOUNTER — HOSPITAL ENCOUNTER (OUTPATIENT)
Dept: PHYSICAL THERAPY | Age: 22
Setting detail: THERAPIES SERIES
Discharge: HOME | End: 2022-05-23
Payer: COMMERCIAL

## 2022-05-23 NOTE — TELEPHONE ENCOUNTER
Pt contacted and she has form and will send thru [employer]. Pt is to leave on 6/8/22. Waiting on form.

## 2022-05-23 NOTE — TELEPHONE ENCOUNTER
Received call from Pt requesting form for 3 week summer camp to be signed.  Has appt in July     Please advise

## 2022-05-23 NOTE — FLOWSHEET NOTE
[facility] and [facility], [address], [address], [address]  Phone: [phone]  Fax [phone]    Physical Therapy  Cancellation/No-show Note  Patient Name:  Tex Stacy  :  2000   Date:  2022  Cancelled visits to date: 1  No-shows to date: 0    Patient status for today's appointment patient:  []  Cancelled  []  Rescheduled appointment  [x]  No-show     Reason given by patient:  []  Patient ill  []  Conflicting appointment  []  No transportation    []  Conflict with work  [x]  No reason given  [x]  Other: pt knows that insurance declined to cover more visits and pt was doing better so she may have chosen to self D/C. I will f/u with her latera this week.     Comments:      Phone call information:   []  Phone call made today to patient at _ time at number provided:      []  Patient answered, conversation as follows:    []  Patient did not answer, message left as follows:  [x]  Phone call not made today    Electronically signed by:  Mich Joyce, PT

## 2022-08-18 ENCOUNTER — OFFICE VISIT (OUTPATIENT)
Dept: ORTHOPEDIC SURGERY | Age: 22
End: 2022-08-18
Payer: COMMERCIAL

## 2022-08-18 VITALS — WEIGHT: 121 LBS | BODY MASS INDEX: 21.44 KG/M2 | HEIGHT: 63 IN

## 2022-08-18 DIAGNOSIS — M54.50 LUMBAR SPINE PAIN: Primary | ICD-10-CM

## 2022-08-18 DIAGNOSIS — M43.00 PARS DEFECT: ICD-10-CM

## 2022-08-18 PROCEDURE — 99214 OFFICE O/P EST MOD 30 MIN: CPT | Performed by: PHYSICIAN ASSISTANT

## 2022-08-18 NOTE — PROGRESS NOTES
Date:  2022    Name:  Brenda Lundberg  Address:  [facility] [address] [address]    :  2000      Age:   25 y.o.    SSN:  [id number]      Medical Record Number:  [id number]    Reason for Visit:    Chief Complaint    New Patient (Lower right back pain )      DOS:2022     HPI: Brenda Lundberg is a 25 y.o. female here today for evaluation of right lower back pain. 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 Yesterday she noticed a sudden increase in right lower back pain. This is gotten worse to the point where any type of motion exacerbates her symptoms. Pain has remained focal to her right lateral lower back. She notices particularly when she is turning the steering wheel or even turning to her right or left while she is driving. She does endorse a history of back pain, this was characterized as upper and thoracic and described as a spasm that was treated earlier in the year with chiropractic and physical therapy. Patient denies any recent UTIs or any current symptoms      Pain Assessment  Location of Pain: Back  Location Modifiers: Right  Severity of Pain: 3  Quality of Pain: Aching  Duration of Pain: Persistent  Frequency of Pain: Constant  Date Pain First Started: 22  Aggravating Factors: Standing, Walking, Bending  Limiting Behavior: Some  Relieving Factors: Rest  Result of Injury: No  Work-Related Injury: No  Are there other pain locations you wish to document?: No  ROS: Review of systems reviewed from Patient History Form completed today and available in the patient's chart under the Media tab.        Past Medical History:   Diagnosis Date    Sprain of foot, left 2019        Past Surgical History:   Procedure Laterality Date    WISDOM TOOTH EXTRACTION         Family History   Problem Relation Age of Onset    Other Maternal Grandfather        Social History     Socioeconomic History    Marital status: Single     Spouse name: None    Number of children: None    Years of education: None    Highest education level: None   Tobacco Use    Smoking status: Never    Smokeless tobacco: Never   Substance and Sexual Activity    Alcohol use: Yes     Comment: Rarely    Drug use: Yes     Types: Marijuana Shayy Amble)     Comment: few times per wk for anx and insomnia       Current Outpatient Medications   Medication Sig Dispense Refill    naproxen (NAPROSYN) 375 MG tablet Take 1 tablet by mouth 2 times daily (with meals) 60 tablet 3     No current facility-administered medications for this visit. No Known Allergies    Vital signs:  Ht 5' 3\" (1.6 m)   Wt 121 lb (54.9 kg)   BMI 21.43 kg/m²      Lumbral/sacral examination:    Gait & station: normal, patient ambulates without assistance    Inspection: Local inspection shows no step-off or bruising. Lumbar alignment is normal.    Skin: There are no rashes, ulcerations or lesions. Palpation: mild right paraspinal spasm. Tenderness to palpation over L3    Range of Motion: Limitation in right lateral rotation and left side bending. Limitation in extension    Strength: Strength testing is 5/5 in all muscle groups tested. Reflexes: Reflexes are symmetrically 2+ at the patellar and ankle tendons. Clonus absent bilaterally at the feet. Special Tests: Straight leg raise and crossed SLR negative. Leg length and pelvis level. Additional Examinations: Negative Trendelenburg test.            Diagnostics:  Radiology:       Pertinent imaging was obtained, interpreted, and reviewed with the patient today, images only - no report available. Lumbar x-ray:    AP and Lateral views were obtained and reviewed of the lumbar spine.       Impression: Lucency over L3-L4 pars     Office Procedures:  Orders Placed This Encounter   Procedures    XR LUMBAR SPINE (2-3 VIEWS)     Standing Status:   Future     Number of Occurrences:   1     Standing Expiration Date:   9/18/2022    [facility]     Referral Priority:   Routine Referral Type:   Eval and Treat     Referral Reason:   Specialty Services Required     Requested Specialty:   Physical Therapist     Number of Visits Requested:   1       Assessment: 20-year-old female with right lower lumbar strain as well as a potential pars defect at L4-L5. Plan: Pertinent imaging was reviewed. The etiology, natural history, and treatment options for the disorder were discussed. The roles of activity medication, antiinflammatories, injections, bracing, physical therapy, and surgical interventions were all described to the patient and questions were answered. The patient's pain is primarily right lumbar, her x-rays raise concern for potential pars defect. In addition she is point tender over this area with back extension. Because of this I do believe she is a candidate for a follow-up with Zehra Raya for further evaluation. In the meantime I would like her to work with her athletic trainers and physical therapist on her range of motion and strengthening. I would like her to avoid dancing and activities that exacerbate her symptoms at this time. Tootie Cottrell is in agreement with this plan. All questions were answered to patient's satisfaction and was encouraged to call with any further questions. Total time spent for evaluation, education, and development of treatment plan: 35 minutes    Jayme Chase, [address] [place] [address]  8/18/2022    This dictation was performed with a verbal recognition program (DRAGON) and it was checked for errors. It is possible that there are still dictated errors within this office note. If so, please bring any areas to my attention for an addendum. All efforts were made to ensure that this office note is accurate.

## 2022-08-23 ENCOUNTER — TELEPHONE (OUTPATIENT)
Dept: ORTHOPEDIC SURGERY | Age: 22
End: 2022-08-23

## 2022-08-23 DIAGNOSIS — M54.50 ACUTE RIGHT-SIDED LOW BACK PAIN WITHOUT SCIATICA: Primary | ICD-10-CM

## 2022-08-23 NOTE — TELEPHONE ENCOUNTER
Called & left a voicemail for the patient informing her I was letting her know I moved her appointment up to 8/29/2022 at the [facility] office at [facility]. If this does not work for her, she may call me back & reschedule at [phone]

## 2022-08-29 ENCOUNTER — OFFICE VISIT (OUTPATIENT)
Dept: ORTHOPEDIC SURGERY | Age: 22
End: 2022-08-29
Payer: COMMERCIAL

## 2022-08-29 VITALS — WEIGHT: 121 LBS | BODY MASS INDEX: 21.44 KG/M2 | HEIGHT: 63 IN

## 2022-08-29 DIAGNOSIS — S39.012A STRAIN OF LUMBAR REGION, INITIAL ENCOUNTER: ICD-10-CM

## 2022-08-29 DIAGNOSIS — M43.00 PARS DEFECT: Primary | ICD-10-CM

## 2022-08-29 DIAGNOSIS — M54.50 ACUTE BILATERAL LOW BACK PAIN WITHOUT SCIATICA: ICD-10-CM

## 2022-08-29 PROCEDURE — 99204 OFFICE O/P NEW MOD 45 MIN: CPT | Performed by: PHYSICIAN ASSISTANT

## 2022-08-29 RX ORDER — MELOXICAM 7.5 MG/1
7.5 TABLET ORAL DAILY PRN
Qty: 30 TABLET | Refills: 0 | Status: SHIPPED | OUTPATIENT
Start: 2022-08-29

## 2022-08-29 NOTE — PROGRESS NOTES
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 She states during practice on 8/17/2022 she developed significant right greater than left low back pain. Her pain is constant. Symptoms are increased with any prolonged activity or prolonged inactivity. She reports some relief with resting. Conservative care includes Tylenol, naproxen, PT, lumbar wrap. She denies any significant improvement. She currently denies any lower extremity radiating pain. Denies any numbness tingling or extremity weakness. Denies any recent bowel or bladder dysfunction or saddle anesthesia. No direct injury or trauma. Past Medical History:   Diagnosis Date    Sprain of foot, left 2019     The pain assessment was noted & reviewed in the medical record today. 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 Social History:    reports that she has never smoked.  She has never used smokeless tobacco. She reports current alcohol use. She reports current drug use. Drug: Marijuana Simmie Kapil). Family History:   Family History   Problem Relation Age of Onset    Other Maternal Grandfather        REVIEW OF SYSTEMS: Full ROS reviewed & scanned into chart  CONSTITUTIONAL: Denies unexplained weight loss, fevers   SKIN: Denies active skin conditions   ENT: Denies dizziness, nosebleeds  RESPIRATORY: Denies current dyspnea, difficulty breathing  CARDIOVASCULAR: Denies chest pain   NEUROLOGICAL: Denies stroke, unsteady gait or progressive weakness  PSYCHOLOGICAL: Denies anxiety, admits depression   HEMATOLOGIC: Denies blood disorders, cancer  ENDOCRINE: Denies excessive thirst, urination, heat/cold  GI: Denies ulcer, nausea, vomiting, diarrhea   : Denies bowel or bladder incontinence       PHYSICAL EXAM:    Vitals: Height 5' 3\" (1.6 m), weight 121 lb (54.9 kg). Pain score 5/10    GENERAL EXAM:  General Apparence: Patient is adequately groomed with no evidence of malnutrition. Orientation: The patient is oriented to time, place and person. Mood & Affect:The patient's mood and affect are appropriate   Lymphatic: The lymphatic examination bilaterally reveals all areas to be without enlargement or induration  Sensation: Sensation is intact without deficit  Coordination/Balance: Good coordination   LUMBAR/SACRAL EXAMINATION:  Inspection: Local inspection shows no step-off or bruising. .      Palpation: Positive tenderness L5 level at midline  Range of Motion: Intact flexion mild loss extension more pain with extension  Strength:   Strength testing is 5/5 in all muscle groups tested. Special Tests:   Straight leg raise and crossed SLR negative. Leg length and pelvis level.  0 out of 5 Bhavna's signs. Skin: There are no rashes, ulcerations or lesions. Reflexes: Reflexes are symmetrically 1+ with reinforcement at the patellar and ankle tendons. Clonus absent bilaterally at the feet.   Gait & station: Normal

## 2022-08-30 ENCOUNTER — TELEPHONE (OUTPATIENT)
Dept: ORTHOPEDIC SURGERY | Age: 22
End: 2022-08-30

## 2022-08-30 ENCOUNTER — TELEPHONE (OUTPATIENT)
Dept: FAMILY MEDICINE CLINIC | Age: 22
End: 2022-08-30

## 2022-08-30 ENCOUNTER — HOSPITAL ENCOUNTER (OUTPATIENT)
Dept: MRI IMAGING | Age: 22
Discharge: HOME | End: 2022-08-30
Payer: COMMERCIAL

## 2022-08-30 DIAGNOSIS — M54.50 ACUTE BILATERAL LOW BACK PAIN WITHOUT SCIATICA: ICD-10-CM

## 2022-08-30 DIAGNOSIS — M43.00 PARS DEFECT: ICD-10-CM

## 2022-08-30 DIAGNOSIS — S39.012A STRAIN OF LUMBAR REGION, INITIAL ENCOUNTER: ICD-10-CM

## 2022-08-30 DIAGNOSIS — S39.012A STRAIN OF LUMBAR REGION, INITIAL ENCOUNTER: Primary | ICD-10-CM

## 2022-08-30 PROCEDURE — 72148 MRI LUMBAR SPINE W/O DYE: CPT

## 2022-08-30 NOTE — TELEPHONE ENCOUNTER
Called & spoke with the patient informing her that we got her MRI results and Mariia Dale went over them. Gregg Contreras PA-C states her MRI results are normal, patient is diagnosed with a strain. Gregg Contreras PA-C recommends a formal course of PT, order has been placed for the [facility]. Patient was recommended to follow up in one month to see Mariia Dale. Patient voiced understanding. Patient rescheduled her f/u with Gregg Contreras PA-C for 9/26/2022 at [facility]. Patient voiced understanding. She may call if needed to reschedule appointment.

## 2022-09-02 ENCOUNTER — HOSPITAL ENCOUNTER (OUTPATIENT)
Dept: PHYSICAL THERAPY | Age: 22
Setting detail: THERAPIES SERIES
Discharge: HOME | End: 2022-09-02
Payer: COMMERCIAL

## 2022-09-02 PROCEDURE — 97161 PT EVAL LOW COMPLEX 20 MIN: CPT

## 2022-09-02 PROCEDURE — 97140 MANUAL THERAPY 1/> REGIONS: CPT

## 2022-09-02 NOTE — FLOWSHEET NOTE
The [address] [place] [address],[address], [address]  Phone: [phone]  Fax [phone]    Physical Therapy Treatment Note/ Progress Report:           Date:  2022    Patient Name:  Elzbieta Husain    :  2000  MRN: [id number]  Restrictions/Precautions:    Medical/Treatment Diagnosis Information:  Diagnosis: S39.012 Lumbar strain  Treatment Diagnosis: M54.5 LBP, M54.6 thoracic pain, M99.03 segmental and somatic dysfunction  Insurance/Certification information:  PT Insurance Information: BCBS, $0 copay, 20 visits, req auth - AIM  Physician Information:   Haseeb Galaviz PA-C  Has the plan of care been signed (Y/N):        []  Yes  [x]  No     Date of Patient follow up with Physician: prn      Is this a Progress Report:     []  Yes  [x]  No        If Yes:  Date Range for reporting period:  Beginning 22  Ending    Progress report will be due (10 Rx or 30 days whichever is less):        Recertification will be due (POC Duration  / 90 days whichever is less): 10/14/22        Visit # Insurance Allowable Auth Required   In-person 1 20 visits [x]  Yes - AIM []  No    Telehealth - - []  Yes []  No    Total            Functional Scale: FOTO thoracic spine score: 52/100 = 52% ability, 48% disability;  Dance Functional Outcome Survery (DFOS):  = 16% ability, 84% disability    Date assessed:  22       Number of Comorbidities:  [x]0     []1-2    []3+    Latex Allergy:  [x]NO      []YES  Preferred Language for Healthcare:   [x]English       []other:      Pain level:  6/10    SUBJECTIVE:  See eval    OBJECTIVE: See eval  Observation:   Test measurements:      RESTRICTIONS/PRECAUTIONS: none    Exercises/Interventions:     Therapeutic Ex (60374) Sets/Reps/ sec Notes/CUES   Cat/camel X10    Child's pose 2x15\" ea way    Supine Tball LE rotation x10    Kneeling tball thoracic extension AAROM X10    Kneeling thread the needle Laurie Sa[address] way    Supine open book X5 ea way    Seated Thoracic extension ROM X10    Supine TA med ball overhead x10 Red med ball   Quadruped progam Verbal review                   Manual Intervention (96991)     STM: thoracolumbar paraspinals, QL, lat dorsi 7'    Lumbar roll manipulation 2' Correct L4 right rot; caviation                       NMR re-education (50124)  CUES NEEDED                                                Therapeutic Activity (48165)                                       Therapeutic Exercise and NMR EXR  [x] (09180) Provided verbal/tactile cueing for activities related to strengthening, flexibility, endurance, ROM  for improvements in proximal hip and core control with self care, mobility, lifting and ambulation.  [] (91458) Provided verbal/tactile cueing for activities related to improving balance, coordination, kinesthetic sense, posture, motor skill, proprioception  to assist with core control in self care, mobility, lifting, and ambulation. Therapeutic Activities:    [] (69131 or 03337) Provided verbal/tactile cueing for activities related to improving balance, coordination, kinesthetic sense, posture, motor skill, proprioception and motor activation to allow for proper function  with self care and ADLs  [] (83872) Provided training and instruction to the patient for proper core and proximal hip recruitment and positioning with ambulation re-education     Home Exercise Program:    [x] (70094) Reviewed/Progressed HEP activities related to strengthening, flexibility, endurance, ROM of core, proximal hip and LE for functional self-care, mobility, lifting and ambulation   [] (67252) Reviewed/Progressed HEP activities related to improving balance, coordination, kinesthetic sense, posture, motor skill, proprioception of core, proximal hip and LE for self care, mobility, lifting, and ambulation      Goals:   Access Code: [email or web address]  URL: [email or web address]  Date: 09/02/2022  Prepared by: Lanie Biswas Nierman    Exercises  Cat Cow to Child's Pose - 1 x daily - 7 x weekly - 3 sets - 10 reps  Child's Pose Stretch - 1 x daily - 7 x weekly - 3 sets - 10 reps  Child's Pose with Sidebending - 1 x daily - 7 x weekly - 3 sets - 10 reps  Child's Pose with Thread the Needle - 1 x daily - 7 x weekly - 3 sets - 10 reps  Supine Lower Trunk Rotation - 1 x daily - 7 x weekly - 3 sets - 10 reps  Sidelying Thoracic Rotation with Open Book - 1 x daily - 7 x weekly - 3 sets - 10 reps  Seated Thoracic Lumbar Extension - 1 x daily - 7 x weekly - 3 sets - 10 reps  Kneeling Thoracic Extension Stretch with Swiss Ball - 1 x daily - 7 x weekly - 3 sets - 10 reps  Quadruped Alternating Arm Lift - 1 x daily - 7 x weekly - 3 sets - 10 reps  Quadruped Alternating Leg Extensions - 1 x daily - 7 x weekly - 3 sets - 10 reps  Bird Dog - 1 x daily - 7 x weekly - 3 sets - 10 reps  Quadruped Hip Abduction and External Rotation - 1 x daily - 7 x weekly - 3 sets - 10 reps  Supine 90/90 Alternating Toe Touch - 1 x daily - 7 x weekly - 3 sets - 10 reps  Supine Scapular Protraction in Flexion with Dumbbells - 1 x daily - 7 x weekly - 3 sets - 10 reps  Supine Shoulder Overhead Flexion with Medicine Ball - 1 x daily - 7 x weekly - 3 sets - 10 reps  Supine 90/90 Upper and Lower Body Twist with Medicine Ball - 1 x daily - 7 x weekly - 3 sets - 10 reps    Manual Treatments:  PROM / STM / Oscillations-Mobs:  G-I, II, III, IV (PA's, Inf., Post.)  [x] (14884) Provided manual therapy to mobilize proximal hip and LS spine soft tissue/joints for the purpose of modulating pain, promoting relaxation,  increasing ROM, reducing/eliminating soft tissue swelling/inflammation/restriction, improving soft tissue extensibility and allowing for proper ROM for normal function with self care, mobility, lifting and ambulation.      Modalities:       Charges  Timed Code Treatment Minutes: 24 min  (Did not bill for TE time due to overlap with Medicare pt)   Total Treatment Minutes: 60 min         [x] EVAL (LOW) 45666   [] EVAL (MOD) 86870   [] EVAL (HIGH) 80653   [] RE-EVAL     [] RL(71097) x     [] IONTO  [] NMR (82345) x     [] VASO  [x] Manual (17386) x   1   [] Other:  [] TA x      [] Mech Traction (77350)  [] ES(attended) (33290)      [] ES (un) (98283):       GOALS:  Patient stated goal: dance without pain  [] Progressing: [] Met: [] Not Met: [] Adjusted    Therapist goals for Patient:   Short Term Goals: To be achieved in: 2 weeks  1. Independent in HEP and progression per patient tolerance, in order to prevent re-injury. [] Progressing: [] Met: [] Not Met: [] Adjusted  2. Patient will have a decrease in pain to facilitate improvement in movement, function, and ADLs as indicated by Functional Deficits. [] Progressing: [] Met: [] Not Met: [] Adjusted      Long Term Goals: To be achieved in: 6 weeks  1. Disability index score of 10% or better for the FOTO thoracic score and DFOS  to assist with reaching prior level of function. [] Progressing: [] Met: [] Not Met: [] Adjusted  2. Patient will demonstrate increased AROM to WNL, good LS mobility, good hip ROM to allow for proper joint functioning as indicated by patients Functional Deficits. [] Progressing: [] Met: [] Not Met: [] Adjusted  3. Patient will demonstrate an increase in Strength to good proximal hip and core activation to allow for proper functional mobility as indicated by patients Functional Deficits. [] Progressing: [] Met: [] Not Met: [] Adjusted  4. Patient will be able to sit with or without back support for at least 1 hour without increased symptoms or restriction. [] Progressing: [] Met: [] Not Met: [] Adjusted  5. Pt will be able to perform large leg gestures greater than 90 deg in front, side, and back motions with proper lumbar p elvic stability and without symptoms for restriction to allow for return to dance/work.      [] Progressing: [] Met: [] Not Met: [] Adjusted     Progression Towards Functional goals:  [] Patient is progressing as expected towards functional goals listed. [] Progression is slowed due to complexities listed. [] Progression has been slowed due to co-morbidities. [x] Plan just implemented, too soon to assess goals progression  [] Other:     Overall Progression Towards Functional goals/ Treatment Progress Update:  [] Patient is progressing as expected towards functional goals listed. [] Progression is slowed due to complexities/Impairments listed. [] Progression has been slowed due to co-morbidities. [x] Plan just implemented, too soon to assess goals progression <30days   [] Goals require adjustment due to lack of progress  [] Patient is not progressing as expected and requires additional follow up with physician  [] Other    Prognosis for POC: [x] Good [] Fair  [] Poor      Patient requires continued skilled intervention: [x] Yes  [] No    Treatment/Activity Tolerance:  [x] Patient able to complete treatment  [] Patient limited by fatigue  [] Patient limited by pain    [] Patient limited by other medical complications  [] Other:     ASSESSMENT: Pt's L4 rotation was corrected with manipulation and pt felt immediate pain relief and reduced R lumbar paraspinal hypertrophy. Return to Play: (if applicable)   []  Stage 1: Intro to Strength   []  Stage 2: Return to Run and Strength   []  Stage 3: Return to Jump and Strength   []  Stage 4: Dynamic Strength and Agility   []  Stage 5: Sport Specific Training     []  Ready to Return to Play, Meets All Above Stages   []  Not Ready for Return to Sports   Comments:                               PLAN: See eval. Pt to be seen 2 times a week for 6 weeks. I put in a request to include dry needling treatment with referring provider.    [] Continue per plan of care [] Alter current plan (see comments above)  [x] Plan of care initiated [] Hold pending MD visit [] Discharge      Electronically signed by:  Arman Dakin, PT, DPT, ATC, OCS [id number]    Note: If patient does not return for scheduled/ recommended follow up visits, this note will serve as a discharge from care along with most recent update on progress.

## 2022-09-02 NOTE — PLAN OF CARE
The [facility], [place], [address]  Phone: [phone]  Fax [phone]    Physical Therapy Certification    Dear Fabby Gomes PA-C  ,    We had the pleasure of evaluating the following patient for physical therapy services at [address] [place] [address]. A summary of our findings can be found in the initial assessment below. This includes our plan of care. If you have any questions or concerns regarding these findings, please do not hesitate to contact me at the office phone number checked above. Thank you for the referral.       Physician Signature:_______________________________Date:__________________  By signing above (or electronic signature), therapists plan is approved by physician    Patient: Nancy Hernández   : 2000   MRN: [id number]  Referring Physician:        Evaluation Date: 2022      Medical Diagnosis Information:  Diagnosis: S39.012 Lumbar strain   Treatment Diagnosis: M54.5 LBP, M54.6 thoracic pain, M99.03 segmental and somatic dysfunction                                         Insurance information: PT Insurance Information: BCBS, $0 copay, 20 visits, req auth - AIM       Precautions/ Contra-indications: none    C-SSRS Triggered by Intake questionnaire (Past 2 wk assessment):   [x] No, Questionnaire did not trigger screening.   [] Yes, Patient intake triggered further evaluation      [] C-SSRS Screening completed  [] PCP notified via Plan of Care  [] Emergency services notified     Latex Allergy:  [x]NO      []YES  Preferred Language for Healthcare:   [x]English       []other:    SUBJECTIVE: Patient stated complaint:about 3 weeks ago started having pain in final rehearsal of the day where she bends over repetitively and felt her back pull. She rolled out and used heat and hoped it would be better next day. The next morning woke up and couldn't move.  She saw the ballet ATC and got referred to spine specialist and they saw a \"shadow\" on the x-ray and she was very point tender so they were concerned and so got an MRI and that came back clear. Since time of injury she felt she initially was getting better but has now plateaued. She was given Meloxicam but has only been on that for 3 days. Denies numbness/tingling, no radicular pain, she has had a lot more crepitus/cavitations in back. At this time pt still has pain and limitation with any prolonged position, back gets tired sitting unsupported, pain turning to reach across her body, bending over to  things, feels stiff to bend over to put on shoes. 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 She feels pain and limitation with any bending over (cambre), large leg gestures (battement, developpe, chan de jambe en lair), to hold proper technique posture to even do sejal-plie (mini-squat) and hurts to engage abs. Hasn't even tried turns and jumps. Next company performance she would like to be back and healthy for is [employer] at end of October. Relevant Medical History:thoracic sprain 2021-22, eating disorder. Functional Disability Index/G-Codes: FOTO thoracic spine score: 52/100 = 52% ability, 48% disability; Dance Functional Outcome Survery (DFOS): 14/90 = 16% ability, 84% disability    Height 63 inches Weight 115lbs  Pain Scale: 6/10  Easing factors: heat, e-stim   Provocative factors: any  prolonged position, bending forward or back, reaching across body, mini-squat or sejal plie, most things in dance.     Type: [x]Constant   [x]Intermittent  []Radiating [x]Localized []other:     Numbness/Tingling: denies    Occupation/School: professional CB2 company member with [employer], also college student and     Living Status/Prior Level of Function: Independent with ADLs and IADLs, professional ballet dancer, highly active    OBJECTIVE:     ROM     LUMBAR FLEX 80% []Disc pathology   []Congenital spine pathologies   []Prior surgical intervention  []Osteoporosis (M81.8)  []Osteopenia (M85.8)   Endocrine conditions   []Hypothyroid (E03.9)  []Hyperthyroid Gastrointestinal conditions   []Constipation (M37.08)   Metabolic conditions   []Morbid obesity (E66.01)  []Diabetes type 1(E10.65) or 2 (E11.65)   []Neuropathy (G60.9)     Pulmonary conditions   []Asthma (J45)  []Coughing   []COPD (J44.9)   Psychological Disorders  [x]Anxiety (F41.9)  [x]Depression (F32.9)   []Other:   []Other:          Barriers to/and or personal factors that will affect rehab potential:              []Age  []Sex              []Motivation/Lack of Motivation                        []Co-Morbidities              []Cognitive Function, education/learning barriers              []Environmental, home barriers              []profession/work barriers  []past PT/medical experience  []other:  Justification:     Falls Risk Assessment (30 days):   [x] Falls Risk assessed and no intervention required. [] Falls Risk assessed and Patient requires intervention due to being higher risk   TUG score (>12s at risk):     [] Falls education provided, including           ASSESSMENT: Pt is a professional ballet dancer with presentation of R lumbar and L thoracic pain after repetitive flexing and twisting motion in dance rehearsal last week. MRI was clear. Pt demonstrates decreased AROM, mild weakness in abdomen and hips and reports pain inhibition being a factor, loss of natural thoracic and lumbar curvature but present of lumbar scoliotic curvature and L4 rotated right. Pt will benefit from PT to address these impairments and return to normal ADLs, and return to work as a [employer] and professional ballet dancer.      Functional Impairments:     []Noted lumbar/proximal hip hypomobility   []Noted lumbosacral and/or generalized hypermobility   [x]Decreased Lumbosacral/hip/LE functional ROM   [x]Decreased core/proximal Modalities as needed that may include: thermal agents, E-stim, Biofeedback, US, iontophoresis as indicated  [x]  Patient education on joint protection, postural re-education, activity modification, progression of HEP. HEP instruction:   Access Code: [email or web address]  URL: [email or web address]  Date: 09/02/2022  Prepared by: Richard Leon    Exercises  Cat Cow to Child's Pose - 1 x daily - 7 x weekly - 3 sets - 10 reps  Child's Pose Stretch - 1 x daily - 7 x weekly - 3 sets - 10 reps  Child's Pose with Sidebending - 1 x daily - 7 x weekly - 3 sets - 10 reps  Child's Pose with Thread the Needle - 1 x daily - 7 x weekly - 3 sets - 10 reps  Supine Lower Trunk Rotation - 1 x daily - 7 x weekly - 3 sets - 10 reps  Sidelying Thoracic Rotation with Open Book - 1 x daily - 7 x weekly - 3 sets - 10 reps  Seated Thoracic Lumbar Extension - 1 x daily - 7 x weekly - 3 sets - 10 reps  Kneeling Thoracic Extension Stretch with Swiss Ball - 1 x daily - 7 x weekly - 3 sets - 10 reps  Quadruped Alternating Arm Lift - 1 x daily - 7 x weekly - 3 sets - 10 reps  Quadruped Alternating Leg Extensions - 1 x daily - 7 x weekly - 3 sets - 10 reps  Bird Dog - 1 x daily - 7 x weekly - 3 sets - 10 reps  Quadruped Hip Abduction and External Rotation - 1 x daily - 7 x weekly - 3 sets - 10 reps  Supine 90/90 Alternating Toe Touch - 1 x daily - 7 x weekly - 3 sets - 10 reps  Supine Scapular Protraction in Flexion with Dumbbells - 1 x daily - 7 x weekly - 3 sets - 10 reps  Supine Shoulder Overhead Flexion with Medicine Ball - 1 x daily - 7 x weekly - 3 sets - 10 reps  Supine 90/90 Upper and Lower Body Twist with Medicine Ball - 1 x daily - 7 x weekly - 3 sets - 10 reps      GOALS:  Patient stated goal: dance without pain  [] Progressing: [] Met: [] Not Met: [] Adjusted    Therapist goals for Patient:   Short Term Goals: To be achieved in: 2 weeks  1. Independent in HEP and progression per patient tolerance, in order to prevent re-injury.    []

## 2022-09-07 ENCOUNTER — HOSPITAL ENCOUNTER (OUTPATIENT)
Dept: PHYSICAL THERAPY | Age: 22
Setting detail: THERAPIES SERIES
Discharge: HOME | End: 2022-09-07
Payer: COMMERCIAL

## 2022-09-07 NOTE — FLOWSHEET NOTE
The [address] [place] [address],[address], [facility], [place], [address]  Phone: [phone]  Fax [phone]    Physical Therapy  Cancellation/No-show Note  Patient Name:  Tootie Cottrell  :  2000   Date:  2022  Cancelled visits to date: 1  No-shows to date: 0    Patient status for today's appointment patient:  [x]  Cancelled  []  Rescheduled appointment  []  No-show     Reason given by patient:  [x]  Patient ill: tested positive for [id number]  []  Conflicting appointment  []  No transportation    []  Conflict with work  []  No reason given  []  Other:     Comments:      Phone call information:   []  Phone call made today to patient at 11:15 am time at number provided:      [x]  Patient answered, conversation as follows: pt will f/u with us based on COVID symptom resolution   []  Patient did not answer, message left as follows:  []  Phone call not made today    Electronically signed by:  Maciej Barry PT

## 2022-09-08 ENCOUNTER — TELEMEDICINE (OUTPATIENT)
Dept: FAMILY MEDICINE CLINIC | Age: 22
End: 2022-09-08
Payer: COMMERCIAL

## 2022-09-08 DIAGNOSIS — Z13.31 POSITIVE DEPRESSION SCREENING: ICD-10-CM

## 2022-09-08 DIAGNOSIS — U07.1 COVID-19: Primary | ICD-10-CM

## 2022-09-08 DIAGNOSIS — F41.9 ANXIETY AND DEPRESSION: ICD-10-CM

## 2022-09-08 DIAGNOSIS — F32.A ANXIETY AND DEPRESSION: ICD-10-CM

## 2022-09-08 PROCEDURE — 99214 OFFICE O/P EST MOD 30 MIN: CPT | Performed by: FAMILY MEDICINE

## 2022-09-08 RX ORDER — FLUOXETINE HYDROCHLORIDE 20 MG/1
20 CAPSULE ORAL DAILY
Qty: 30 CAPSULE | Refills: 3 | Status: SHIPPED | OUTPATIENT
Start: 2022-09-08 | End: 2022-10-12

## 2022-09-08 ASSESSMENT — PATIENT HEALTH QUESTIONNAIRE - PHQ9
4. FEELING TIRED OR HAVING LITTLE ENERGY: 3
10. IF YOU CHECKED OFF ANY PROBLEMS, HOW DIFFICULT HAVE THESE PROBLEMS MADE IT FOR YOU TO DO YOUR WORK, TAKE CARE OF THINGS AT HOME, OR GET ALONG WITH OTHER PEOPLE: 2
6. FEELING BAD ABOUT YOURSELF - OR THAT YOU ARE A FAILURE OR HAVE LET YOURSELF OR YOUR FAMILY DOWN: 3
3. TROUBLE FALLING OR STAYING ASLEEP: 3
SUM OF ALL RESPONSES TO PHQ9 QUESTIONS 1 & 2: 6
SUM OF ALL RESPONSES TO PHQ QUESTIONS 1-9: 21
8. MOVING OR SPEAKING SO SLOWLY THAT OTHER PEOPLE COULD HAVE NOTICED. OR THE OPPOSITE, BEING SO FIGETY OR RESTLESS THAT YOU HAVE BEEN MOVING AROUND A LOT MORE THAN USUAL: 0
5. POOR APPETITE OR OVEREATING: 3
7. TROUBLE CONCENTRATING ON THINGS, SUCH AS READING THE NEWSPAPER OR WATCHING TELEVISION: 3
SUM OF ALL RESPONSES TO PHQ QUESTIONS 1-9: 21
9. THOUGHTS THAT YOU WOULD BE BETTER OFF DEAD, OR OF HURTING YOURSELF: 0
SUM OF ALL RESPONSES TO PHQ QUESTIONS 1-9: 21
1. LITTLE INTEREST OR PLEASURE IN DOING THINGS: 3
SUM OF ALL RESPONSES TO PHQ QUESTIONS 1-9: 21
2. FEELING DOWN, DEPRESSED OR HOPELESS: 3

## 2022-09-08 ASSESSMENT — COLUMBIA-SUICIDE SEVERITY RATING SCALE - C-SSRS
2. HAVE YOU ACTUALLY HAD ANY THOUGHTS OF KILLING YOURSELF?: NO
6. HAVE YOU EVER DONE ANYTHING, STARTED TO DO ANYTHING, OR PREPARED TO DO ANYTHING TO END YOUR LIFE?: NO
1. WITHIN THE PAST MONTH, HAVE YOU WISHED YOU WERE DEAD OR WISHED YOU COULD GO TO SLEEP AND NOT WAKE UP?: NO

## 2022-09-08 NOTE — PROGRESS NOTES
2022    TELEHEALTH EVALUATION -- Audio/Visual (During TEWWW-85 public health emergency)    HPI:    Joo Chacko (:  2000) has requested an audio/video evaluation for the following concern(s):    Pt is currently seeing Dr Aminah Rivera and PT for lumbar spine strain. 22 lumbar mri was nl. Pars defect as suspected by ortho was not seen. Pain is slowly improving. 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 Had nl dexa 2022. Pt conts vit D qd. Had ST 2 days ago. Then developed cough and 1 day ago, covid test at home was pos. Nose is mildly stuffy, cough, ST. Had covid 2020 as well. Anxiety increased this summer. Has few panic atttacks q wk. Had few panic attacks remotely. Did 3 wk summer intensive x 3 wk in [place], then spent few wks with family elsewhere in [place]. Parents fought often, induced anxiety. Was out of comfort zone at [facility], did not know anyone. GMa passed while pt was at [facility]. Pt had not dealth with family death. Upon return to work in [place], had back [address]. Pt feels anxiety has worsened since return to [place]. 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 Conts to rebekahely see Dr Coleman Speaker, sports psychologist. Pt has learned coping skills, including putting ice pack on forehead when panicked which allows body to calm down. Sometimes techniques do not work. Was on zoloft 25 mg in , felt numb while on it, felt she had no empathy. Pt was rx'd lexapro 2021. Took for 1 mo but pharmacy had insurance issue with refill. Pt is not sure if she had benefit after just 1 mo. Pt also acknowledges depression. Pt denies self harm but has intrusive thoughts about carwling out of skin or leaving body. No thoughs or plans to harm herself or others. Has difficulty staying asleep. Finds she awakens in a panic at times.  Has to stand up from bed, get water. If stops moving in daytime, feels sleepy. Review of Systems    Prior to Visit Medications    Medication Sig Taking? Authorizing Provider   FLUoxetine (PROZAC) 20 MG capsule Take 1 capsule by mouth daily Yes Sandeep Benavides MD   meloxicam (MOBIC) 7.5 MG tablet Take 1 tablet by mouth daily as needed for Pain (PAIN) Yes Sachin Harris PA-C       Social History     Tobacco Use    Smoking status: Never    Smokeless tobacco: Never   Substance Use Topics    Alcohol use: Yes     Comment: Rarely    Drug use: Yes     Types: Marijuana (Weed)     Comment: few times per wk for anx and insomnia            PHYSICAL EXAMINATION:  [ INSTRUCTIONS:  \"[x]\" Indicates a positive item  \"[]\" Indicates a negative item  -- DELETE ALL ITEMS NOT EXAMINED]      Constitutional: [x] Appears well-developed and well-nourished [x] No apparent distress      [] Abnormal-   Mental status  [x] Alert and awake  [x] Oriented to person/place/time [x]Able to follow commands      Eyes:  EOM    [x]  Normal  [] Abnormal-  Sclera  [x]  Normal  [] Abnormal -         Discharge [x]  None visible  [] Abnormal -    HENT:   [x] Normocephalic, atraumatic.   [] Abnormal   [] Mouth/Throat: Mucous membranes are moist.     External Ears [x] Normal  [] Abnormal-     Neck: [x] No visualized mass     Pulmonary/Chest: [x] Respiratory effort normal.  [x] No visualized signs of difficulty breathing or respiratory distress        [] Abnormal-      Musculoskeletal:   [] Normal gait with no signs of ataxia         [x] Normal range of motion of neck        [] Abnormal-       Neurological:        [x] No Facial Asymmetry (Cranial nerve 7 motor function) (limited exam to video visit)          [x] No gaze palsy        [] Abnormal-         Skin:        [x] No significant exanthematous lesions or discoloration noted on facial skin         [] Abnormal-            Psychiatric:       [x] Normal Affect [] No Hallucinations        [] Abnormal-     Other pertinent observable physical exam findings-     ASSESSMENT/PLAN:  1. COVID-19  - supportive care, isolate x 5d, mask x 5 more days    2. Positive depression screening  - prozac 20 qd, cont telehealth with sports psychologist    3. Anxiety and depression  - see #2. Return in about 4 weeks (around 10/6/2022) for f/u mood. Simba Rascon is a 25 y.o. female being evaluated by a Virtual Visit (video visit) encounter to address concerns as mentioned above. A caregiver was present when appropriate. Due to this being a TeleHealth encounter (During [facility] public health emergency), evaluation of the following organ systems was limited: Vitals/Constitutional/EENT/Resp/CV/GI//MS/Neuro/Skin/Heme-Lymph-Imm. Pursuant to the emergency declaration under the [address] [place] [address] and the [employer] Act, this Virtual Visit was conducted with patient's (and/or legal guardian's) consent, to reduce the patient's risk of exposure to COVID-19 and provide necessary medical care. The patient (and/or legal guardian) has also been advised to contact this office for worsening conditions or problems, and seek emergency medical treatment and/or call 911 if deemed necessary. Simba Rascon, was evaluated through a synchronous (real-time) audio-video encounter. The patient (or guardian if applicable) is aware that this is a billable service, which includes applicable co-pays. This Virtual Visit was conducted with patient's (and/or legal guardian's) consent. The visit was conducted pursuant to the emergency declaration under the [address] [place] [address] and the [employer] Act. Patient identification was verified, and a caregiver was present when appropriate. The patient was located at Home: [place] [id number].    Provider was

## 2022-09-09 ENCOUNTER — APPOINTMENT (OUTPATIENT)
Dept: PHYSICAL THERAPY | Age: 22
End: 2022-09-09
Payer: COMMERCIAL

## 2022-09-12 ENCOUNTER — HOSPITAL ENCOUNTER (OUTPATIENT)
Dept: PHYSICAL THERAPY | Age: 22
Setting detail: THERAPIES SERIES
Discharge: HOME | End: 2022-09-12
Payer: COMMERCIAL

## 2022-09-12 PROCEDURE — 97140 MANUAL THERAPY 1/> REGIONS: CPT

## 2022-09-12 PROCEDURE — 97110 THERAPEUTIC EXERCISES: CPT

## 2022-09-12 NOTE — FLOWSHEET NOTE
[facility] , [address], [address], [address]  Phone: [phone]  Fax [phone]    Physical Therapy Treatment Note/ Progress Report:           Date:  2022    Patient Name:  Donato Link    :  2000  MRN: [id number]  Restrictions/Precautions:    Medical/Treatment Diagnosis Information:  Diagnosis: S39.012 Lumbar strain  Treatment Diagnosis: M54.5 LBP, M54.6 thoracic pain, M99.03 segmental and somatic dysfunction  Insurance/Certification information:  PT Insurance Information: BCBS, $0 copay, 20 visits, req auth - AIM  Physician Information:   Kate Vaughn PA-C  Has the plan of care been signed (Y/N):        []  Yes  [x]  No     Date of Patient follow up with Physician: prn      Is this a Progress Report:     []  Yes  [x]  No        If Yes:  Date Range for reporting period:  Beginning 22  Ending    Progress report will be due (10 Rx or 30 days whichever is less):        Recertification will be due (POC Duration  / 90 days whichever is less): 10/14/22        Visit # Insurance Allowable Auth Required   In-person 2 20 visits [x]  Yes - AIM []  No    Telehealth - - []  Yes []  No    Total            Functional Scale: FOTO thoracic spine score: 52/100 = 52% ability, 48% disability; Dance Functional Outcome Survery (DFOS):  = 16% ability, 84% disability    Date assessed:  22       Number of Comorbidities:  [x]0     []1-2    []3+    Latex Allergy:  [x]NO      []YES  Preferred Language for Healthcare:   [x]English       []other:      Pain level:  0-6/10    SUBJECTIVE:  Pt was sick with COVID 19 last week and unable to attended PT or dance classes. She reports however she feels the resting did help and she was able to keep up with the HEP I gave her.  She is now able to be pain free for most ADLs but still finds sudden and sharp increase in pain with cross body reaching, end-range torso flexion, and end range torso extension particularly when arms gestures are included. OBJECTIVE:   Observation: Tender and tight R thoracic lumbar paraspinal. No spinal malalignment, normal P-A mobility. Test measurements:      RESTRICTIONS/PRECAUTIONS: none    Exercises/Interventions:     Therapeutic Ex (41650) Sets/Reps/ sec Notes/CUES   Cat/camel 1X10    Child's pose 2x15\" ea way    Supine Tball LE rotation    Kneeling tball thoracic extension AAROM    Kneeling thread the needle    Supine open book    Seated Thoracic extension ROM    Supine TA med ball overhead Red med ball   Quadruped progam    Row series: row, low row, high row Green 2x10 Cue retract without rib flare   B ER Red 2x15 \"   Supine foam roller snow angles 2x10 ea way    Supine foam roller 90/90 toe taps 2x10                   Manual Intervention (61473)     STM: thoracolumbar paraspinals, QL, lat dorsi 10'    Lumbar roll manipulation Correct L4 right rot; caviation   Cupping R paraspinals 5'                   NMR re-education (39470)  CUES NEEDED                                                Therapeutic Activity (57487)                                       Therapeutic Exercise and NMR EXR  [x] (12129) Provided verbal/tactile cueing for activities related to strengthening, flexibility, endurance, ROM  for improvements in proximal hip and core control with self care, mobility, lifting and ambulation.  [] (54674) Provided verbal/tactile cueing for activities related to improving balance, coordination, kinesthetic sense, posture, motor skill, proprioception  to assist with core control in self care, mobility, lifting, and ambulation.      Therapeutic Activities:    [] (99607 or 46666) Provided verbal/tactile cueing for activities related to improving balance, coordination, kinesthetic sense, posture, motor skill, proprioception and motor activation to allow for proper function  with self care and ADLs  [] (44452) Provided training and instruction to the patient for proper core and proximal hip recruitment and positioning with ambulation re-education     Home Exercise Program:    [x] (98768) Reviewed/Progressed HEP activities related to strengthening, flexibility, endurance, ROM of core, proximal hip and LE for functional self-care, mobility, lifting and ambulation   [] (01716) Reviewed/Progressed HEP activities related to improving balance, coordination, kinesthetic sense, posture, motor skill, proprioception of core, proximal hip and LE for self care, mobility, lifting, and ambulation      Access Code: [email or web address]  URL: [email or web address]  Date: 09/12/2022  Prepared by: Julio Miller)    Exercises  Standing Row with Anchored Resistance - 1 x daily - 7 x weekly - 3 sets - 10 reps  Standing Row with Resistance with Anchored Resistance at Chest Height Palms Down - 1 x daily - 7 x weekly - 3 sets - 10 reps  Shoulder extension with resistance - Neutral - 1 x daily - 7 x weekly - 3 sets - 10 reps  Shoulder External Rotation and Scapular Retraction with Resistance - 1 x daily - 7 x weekly - 3 sets - 10 reps  Supine 90/90 Alternating Toe Touch - 1 x daily - 7 x weekly - 3 sets - 10 reps  Supine Thoracic Mobilization Foam Roll Horizontal with Arm Stretch - 1 x daily - 7 x weekly - 3 sets - 10 reps      Manual Treatments:  PROM / STM / Oscillations-Mobs:  G-I, II, III, IV (PA's, Inf., Post.)  [x] (47603) Provided manual therapy to mobilize proximal hip and LS spine soft tissue/joints for the purpose of modulating pain, promoting relaxation,  increasing ROM, reducing/eliminating soft tissue swelling/inflammation/restriction, improving soft tissue extensibility and allowing for proper ROM for normal function with self care, mobility, lifting and ambulation.      Modalities:       Charges  Timed Code Treatment Minutes: 45 min   Total Treatment Minutes: 45 min         [] EVAL (LOW) 63313   [] EVAL (MOD) 45871   [] EVAL (HIGH) 68800   [] RE-EVAL     [x] SI(65182) x  2   [] IONTO  [] NMR (20504) x     [] VASO  [x] Manual (71086) x   1   [] Other:  [] TA x      [] Mech Traction (22251)  [] ES(attended) (59325)      [] ES (un) (53938):       GOALS:  Patient stated goal: dance without pain  [] Progressing: [] Met: [] Not Met: [] Adjusted    Therapist goals for Patient:   Short Term Goals: To be achieved in: 2 weeks  1. Independent in HEP and progression per patient tolerance, in order to prevent re-injury. [] Progressing: [] Met: [] Not Met: [] Adjusted  2. Patient will have a decrease in pain to facilitate improvement in movement, function, and ADLs as indicated by Functional Deficits. [] Progressing: [] Met: [] Not Met: [] Adjusted      Long Term Goals: To be achieved in: 6 weeks  1. Disability index score of 10% or better for the FOTO thoracic score and DFOS  to assist with reaching prior level of function. [] Progressing: [] Met: [] Not Met: [] Adjusted  2. Patient will demonstrate increased AROM to WNL, good LS mobility, good hip ROM to allow for proper joint functioning as indicated by patients Functional Deficits. [] Progressing: [] Met: [] Not Met: [] Adjusted  3. Patient will demonstrate an increase in Strength to good proximal hip and core activation to allow for proper functional mobility as indicated by patients Functional Deficits. [] Progressing: [] Met: [] Not Met: [] Adjusted  4. Patient will be able to sit with or without back support for at least 1 hour without increased symptoms or restriction. [] Progressing: [] Met: [] Not Met: [] Adjusted  5. Pt will be able to perform large leg gestures greater than 90 deg in front, side, and back motions with proper lumbar p elvic stability and without symptoms for restriction to allow for return to dance/work. [] Progressing: [] Met: [] Not Met: [] Adjusted     Progression Towards Functional goals:  [] Patient is progressing as expected towards functional goals listed.     [] Progression is slowed due to complexities listed. [] Progression has been slowed due to co-morbidities. [x] Plan just implemented, too soon to assess goals progression  [] Other:     Overall Progression Towards Functional goals/ Treatment Progress Update:  [] Patient is progressing as expected towards functional goals listed. [] Progression is slowed due to complexities/Impairments listed. [] Progression has been slowed due to co-morbidities. [x] Plan just implemented, too soon to assess goals progression <30days   [] Goals require adjustment due to lack of progress  [] Patient is not progressing as expected and requires additional follow up with physician  [] Other    Prognosis for POC: [x] Good [] Fair  [] Poor      Patient requires continued skilled intervention: [x] Yes  [] No    Treatment/Activity Tolerance:  [x] Patient able to complete treatment  [] Patient limited by fatigue  [] Patient limited by pain    [] Patient limited by other medical complications  [] Other:     ASSESSMENT: Pt alignment normal and pain free throughout spinal segments. Remaining tenderness present at R paraspinals that showed significant color change but also relaxation from cupping today. Pt did fanatic with exercises today and showing steady progress. Return to Play: (if applicable)   [x]  Stage 1: Intro to Strength   []  Stage 2: Return to Run and Strength   []  Stage 3: Return to Jump and Strength   []  Stage 4: Dynamic Strength and Agility   []  Stage 5: Sport Specific Training     []  Ready to Return to Play, Meets All Above Stages   []  Not Ready for Return to Sports   Comments:                               PLAN: See eval. Pt to be seen 2 times a week for 6 weeks. I put in a request to include dry needling treatment with referring provider.    [x] Continue per plan of care [] Alter current plan (see comments above)  [] Plan of care initiated [] Hold pending MD visit [] Discharge      Electronically signed by:  Julius Dillon, PT, DPT, ATC, OCS [id number]    Note: If patient does not return for scheduled/ recommended follow up visits, this note will serve as a discharge from care along with most recent update on progress.

## 2022-09-14 ENCOUNTER — HOSPITAL ENCOUNTER (OUTPATIENT)
Dept: PHYSICAL THERAPY | Age: 22
Setting detail: THERAPIES SERIES
Discharge: HOME | End: 2022-09-14
Payer: COMMERCIAL

## 2022-09-14 PROCEDURE — 97110 THERAPEUTIC EXERCISES: CPT

## 2022-09-14 PROCEDURE — 97140 MANUAL THERAPY 1/> REGIONS: CPT

## 2022-09-14 NOTE — FLOWSHEET NOTE
The [address] [place] [address],[address], [address], [address] [place] [address], [address] [place] [address] Road  Phone: [phone]  Fax [phone]    Physical Therapy Treatment Note/ Progress Report:           Date:  2022    Patient Name:  Himanshu King    :  2000  MRN: [id number]  Restrictions/Precautions:    Medical/Treatment Diagnosis Information:  Diagnosis: S39.012 Lumbar strain  Treatment Diagnosis: M54.5 LBP, M54.6 thoracic pain, M99.03 segmental and somatic dysfunction  Insurance/Certification information:  PT Insurance Information: BCBS, $0 copay, 20 visits, req auth - AIM  Physician Information:   Marques Eason PA-C  Has the plan of care been signed (Y/N):        []  Yes  [x]  No     Date of Patient follow up with Physician: prn      Is this a Progress Report:     []  Yes  [x]  No        If Yes:  Date Range for reporting period:  Beginning 22  Ending    Progress report will be due (10 Rx or 30 days whichever is less): 88       Recertification will be due (POC Duration  / 90 days whichever is less): 10/14/22        Visit # Insurance Allowable Auth Required   In-person 3 20 visits [x]  Yes - AIM []  No    Telehealth - - []  Yes []  No    Total            Functional Scale: FOTO thoracic spine score: 52/100 = 52% ability, 48% disability; Dance Functional Outcome Survery (DFOS):  = 16% ability, 84% disability    Date assessed:  22       Number of Comorbidities:  [x]0     []1-2    []3+    Latex Allergy:  [x]NO      []YES  Preferred Language for Healthcare:   [x]English       []other:      Pain level:  0-4/10    SUBJECTIVE:  Pt reports she feels like she is doing better. She feels like her ROM is back to normal and overall not getting shapr pain anymore but generally sore at this point. OBJECTIVE:   Observation: Tender and tight R thoracic lumbar paraspinal but 50% improved from last visit. Ecchymosis still present 2 days later from last visit cupping.  Very tight glutes and piriformis which will affect back mobility and tension. No spinal malalignment, normal P-A mobility. Test measurements:      RESTRICTIONS/PRECAUTIONS: none    Exercises/Interventions:     Therapeutic Ex (27446) Sets/Reps/ sec Notes/CUES   Cat/camel 1X10    Child's pose    Supine Tball LE rotation    Kneeling tball thoracic extension AAROM    Kneeling thread the needle    Supine open book    Seated Thoracic extension ROM    Supine TA med ball overhead Red med ball   Quadruped progam    Row series: row, low row, high row Cue retract without rib flare   B ER \"   Supine foam roller snow angles 1x10 ea way    Supine foam roller 90/90 toe taps 1x10    Ref: row series  Low row  Horiz abd 1R1Y x10  1R1Y x10  1R x10    Ref: mermaid X5 R/L  W/ rotation x5 R/L    CadilacL thoracic extensions x10    Red: seated torso rotataions 1B x10 R/L    Manual Intervention (12196)     STM: thoracolumbar paraspinals, QL, lat dorsi, upper trap, glutes, pirifomris 40' MHP on back while working on glutes   Lumbar roll manipulation Correct L4 right rot; caviation   Cupping R paraspinals                   NMR re-education (74621)  CUES NEEDED                                                Therapeutic Activity (60238)                                       Therapeutic Exercise and NMR EXR  [x] (39623) Provided verbal/tactile cueing for activities related to strengthening, flexibility, endurance, ROM  for improvements in proximal hip and core control with self care, mobility, lifting and ambulation.  [] (98476) Provided verbal/tactile cueing for activities related to improving balance, coordination, kinesthetic sense, posture, motor skill, proprioception  to assist with core control in self care, mobility, lifting, and ambulation.      Therapeutic Activities:    [] (01574 or 83673) Provided verbal/tactile cueing for activities related to improving balance, coordination, kinesthetic sense, posture, motor skill, proprioception and motor activation to allow for proper function  with self care and ADLs  [] (38504) Provided training and instruction to the patient for proper core and proximal hip recruitment and positioning with ambulation re-education     Home Exercise Program:    [x] (91215) Reviewed/Progressed HEP activities related to strengthening, flexibility, endurance, ROM of core, proximal hip and LE for functional self-care, mobility, lifting and ambulation   [] (31321) Reviewed/Progressed HEP activities related to improving balance, coordination, kinesthetic sense, posture, motor skill, proprioception of core, proximal hip and LE for self care, mobility, lifting, and ambulation      Access Code: [email or web address]  URL: [email or web address]  Date: 09/12/2022  Prepared by: Iveth Rodriguez)    Exercises  Standing Row with Anchored Resistance - 1 x daily - 7 x weekly - 3 sets - 10 reps  Standing Row with Resistance with Anchored Resistance at Chest Height Palms Down - 1 x daily - 7 x weekly - 3 sets - 10 reps  Shoulder extension with resistance - Neutral - 1 x daily - 7 x weekly - 3 sets - 10 reps  Shoulder External Rotation and Scapular Retraction with Resistance - 1 x daily - 7 x weekly - 3 sets - 10 reps  Supine 90/90 Alternating Toe Touch - 1 x daily - 7 x weekly - 3 sets - 10 reps  Supine Thoracic Mobilization Foam Roll Horizontal with Arm Stretch - 1 x daily - 7 x weekly - 3 sets - 10 reps    Manual Treatments:  PROM / STM / Oscillations-Mobs:  G-I, II, III, IV (PA's, Inf., Post.)  [x] (01435) Provided manual therapy to mobilize proximal hip and LS spine soft tissue/joints for the purpose of modulating pain, promoting relaxation,  increasing ROM, reducing/eliminating soft tissue swelling/inflammation/restriction, improving soft tissue extensibility and allowing for proper ROM for normal function with self care, mobility, lifting and ambulation.      Modalities:       Charges  Timed Code Treatment Minutes: 55min   Total Treatment Minutes: 55min       [] EVAL (LOW) 46366   [] EVAL (MOD) 74348   [] EVAL (HIGH) 22779   [] RE-EVAL     [x] ZR(06747) x  1  [] IONTO  [] NMR (20227) x     [] VASO  [x] Manual (69880) x   3   [] Other:  [] TA x      [] Mech Traction (24483)  [] ES(attended) (05535)      [] ES (un) (98942):       GOALS:  Patient stated goal: dance without pain  [] Progressing: [] Met: [] Not Met: [] Adjusted    Therapist goals for Patient:   Short Term Goals: To be achieved in: 2 weeks  1. Independent in HEP and progression per patient tolerance, in order to prevent re-injury. [] Progressing: [x] Met: [] Not Met: [] Adjusted  2. Patient will have a decrease in pain to facilitate improvement in movement, function, and ADLs as indicated by Functional Deficits. [] Progressing: [x] Met: [] Not Met: [] Adjusted      Long Term Goals: To be achieved in: 6 weeks  1. Disability index score of 10% or better for the FOTO thoracic score and DFOS  to assist with reaching prior level of function. [] Progressing: [] Met: [] Not Met: [] Adjusted  2. Patient will demonstrate increased AROM to WNL, good LS mobility, good hip ROM to allow for proper joint functioning as indicated by patients Functional Deficits. [] Progressing: [x] Met: [] Not Met: [] Adjusted  3. Patient will demonstrate an increase in Strength to good proximal hip and core activation to allow for proper functional mobility as indicated by patients Functional Deficits. [] Progressing: [] Met: [] Not Met: [] Adjusted  4. Patient will be able to sit with or without back support for at least 1 hour without increased symptoms or restriction. [x] Progressing: [] Met: [] Not Met: [] Adjusted  5. Pt will be able to perform large leg gestures greater than 90 deg in front, side, and back motions with proper lumbar p elvic stability and without symptoms for restriction to allow for return to dance/work.      [] Progressing: [] Met: [] Not Met: [] Adjusted     Progression Towards Functional goals:  [x] Patient is progressing as expected towards functional goals listed. [] Progression is slowed due to complexities listed. [] Progression has been slowed due to co-morbidities. [] Plan just implemented, too soon to assess goals progression  [] Other:     Overall Progression Towards Functional goals/ Treatment Progress Update:  [] Patient is progressing as expected towards functional goals listed. [] Progression is slowed due to complexities/Impairments listed. [] Progression has been slowed due to co-morbidities. [x] Plan just implemented, too soon to assess goals progression <30days   [] Goals require adjustment due to lack of progress  [] Patient is not progressing as expected and requires additional follow up with physician  [] Other    Prognosis for POC: [x] Good [] Fair  [] Poor      Patient requires continued skilled intervention: [x] Yes  [] No    Treatment/Activity Tolerance:  [x] Patient able to complete treatment  [] Patient limited by fatigue  [] Patient limited by pain    [] Patient limited by other medical complications  [] Other:     ASSESSMENT: Pt showing good progress. Less localized spasm and more global myofascial restriction today but address full posterior chain myofascial release and pt felt great afterwards and able to work through higher level activities and motions on [employer]. Return to Play: (if applicable)   [x]  Stage 1: Intro to Strength   []  Stage 2: Return to Run and Strength   []  Stage 3: Return to Jump and Strength   []  Stage 4: Dynamic Strength and Agility   []  Stage 5: Sport Specific Training     []  Ready to Return to Play, Meets All Above Stages   []  Not Ready for Return to Sports   Comments:                               PLAN: See eval. Pt to be seen 2 times a week for 6 weeks. I put in a request to include dry needling treatment with referring provider.    [x] Continue per plan of care [] Alter current plan (see comments above)  [] Plan of care initiated [] Hold pending MD visit [] Discharge      Electronically signed by:  Orlando Butt, PT, DPT, ATC, OCS [id number]    Note: If patient does not return for scheduled/ recommended follow up visits, this note will serve as a discharge from care along with most recent update on progress.

## 2022-09-19 ENCOUNTER — HOSPITAL ENCOUNTER (OUTPATIENT)
Dept: PHYSICAL THERAPY | Age: 22
Setting detail: THERAPIES SERIES
Discharge: HOME | End: 2022-09-19
Payer: COMMERCIAL

## 2022-09-19 PROCEDURE — 97110 THERAPEUTIC EXERCISES: CPT

## 2022-09-19 PROCEDURE — 97140 MANUAL THERAPY 1/> REGIONS: CPT

## 2022-09-19 NOTE — FLOWSHEET NOTE
The [address]  Phone: [phone]  Fax [phone]    Physical Therapy Treatment Note/ Progress Report:           Date:  2022    Patient Name:  Lieutenant Cm    :  2000  MRN: [id number]  Restrictions/Precautions:    Medical/Treatment Diagnosis Information:  Diagnosis: S39.012 Lumbar strain  Treatment Diagnosis: M54.5 LBP, M54.6 thoracic pain, M99.03 segmental and somatic dysfunction  Insurance/Certification information:  PT Insurance Information: BCBS, $0 copay, 20 visits, req auth - AIM  Physician Information:   Micheal Gottron, PA-C  Has the plan of care been signed (Y/N):        []  Yes  [x]  No     Date of Patient follow up with Physician: prn      Is this a Progress Report:     []  Yes  [x]  No        If Yes:  Date Range for reporting period:  Beginning 22  Ending    Progress report will be due (10 Rx or 30 days whichever is less):        Recertification will be due (POC Duration  / 90 days whichever is less): 10/14/22        Visit # Insurance Allowable Auth Required   In-person 4 20 visits [x]  Yes - AIM []  No    Telehealth - - []  Yes []  No    Total            Functional Scale: FOTO thoracic spine score: 52/100 = 52% ability, 48% disability; Dance Functional Outcome Survery (DFOS):  = 16% ability, 84% disability    Date assessed:  22       Number of Comorbidities:  [x]0     []1-2    []3+    Latex Allergy:  [x]NO      []YES  Preferred Language for Healthcare:   [x]English       []other:      Pain level:  0-3/10    SUBJECTIVE:  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  She reports that trunk extension is mostly the thing that bothers her at this point and bending forward and sideways feels back to normal.      OBJECTIVE:   Observation: general tenderness and tightness in thoracolumbar paraspinals but no longer in spasm with trigger points. Test measurements:      RESTRICTIONS/PRECAUTIONS: none    Exercises/Interventions:     Therapeutic Ex (27810) Sets/Reps/ sec Notes/CUES   Cat/camel 1X10    Child's pose    Supine Tball LE rotation    Kneeling tball thoracic extension AAROM    Kneeling thread the needle    Supine open book    Seated Thoracic extension ROM    Supine TA med ball overhead Red med ball   Quadruped progam    Row series: row, low row, high row Cue retract without rib flare   B ER \"   Supine foam roller snow angles    Supine foam roller 90/90 toe taps    Ref: row series  Low row  Horiz abd 1R1B1Y x10  1R1B x10  1R x10    Ref: mermaid 2X5 R/L  W/ rotation 2x5 R/L    [place] thoracic extensions    Ref: seated torso rotataions 1B x10 R/L    Tball walkout bridge w/ heel raise 2x5x5    Tball TA arms \"Y\" 2# 2x10    Ref: prone TA HS curl 1R x8    Manual Intervention (57240)     STM: thoracolumbar paraspinals, QL, lat dorsi, upper trap, glutes, pirifomris 15' MHP on back while working on glutes   Lumbar roll manipulation Correct L4 right rot; caviation   Cupping R paraspinals                   NMR re-education (26310)  CUES NEEDED                                                Therapeutic Activity (08652)                                       Therapeutic Exercise and NMR EXR  [x] (14819) Provided verbal/tactile cueing for activities related to strengthening, flexibility, endurance, ROM  for improvements in proximal hip and core control with self care, mobility, lifting and ambulation.  [] (11513) Provided verbal/tactile cueing for activities related to improving balance, coordination, kinesthetic sense, posture, motor skill, proprioception  to assist with core control in self care, mobility, lifting, and ambulation.      Therapeutic Activities:    [] (40662 or ) Provided verbal/tactile cueing for activities related to improving balance, coordination, kinesthetic sense, posture, motor skill, proprioception and motor activation to allow for proper function  with self care and ADLs  [] (18520) Provided training and instruction to the patient for proper core and proximal hip recruitment and positioning with ambulation re-education     Home Exercise Program:    [] (34809) Reviewed/Progressed HEP activities related to strengthening, flexibility, endurance, ROM of core, proximal hip and LE for functional self-care, mobility, lifting and ambulation   [] (04994) Reviewed/Progressed HEP activities related to improving balance, coordination, kinesthetic sense, posture, motor skill, proprioception of core, proximal hip and LE for self care, mobility, lifting, and ambulation      Access Code: [email or web address]  URL: [email or web address]  Date: 09/12/2022  Prepared by: Cleo Gordon)    Exercises  Standing Row with Anchored Resistance - 1 x daily - 7 x weekly - 3 sets - 10 reps  Standing Row with Resistance with Anchored Resistance at Chest Height Palms Down - 1 x daily - 7 x weekly - 3 sets - 10 reps  Shoulder extension with resistance - Neutral - 1 x daily - 7 x weekly - 3 sets - 10 reps  Shoulder External Rotation and Scapular Retraction with Resistance - 1 x daily - 7 x weekly - 3 sets - 10 reps  Supine 90/90 Alternating Toe Touch - 1 x daily - 7 x weekly - 3 sets - 10 reps  Supine Thoracic Mobilization Foam Roll Horizontal with Arm Stretch - 1 x daily - 7 x weekly - 3 sets - 10 reps    Manual Treatments:  PROM / STM / Oscillations-Mobs:  G-I, II, III, IV (PA's, Inf., Post.)  [x] (88851) Provided manual therapy to mobilize proximal hip and LS spine soft tissue/joints for the purpose of modulating pain, promoting relaxation,  increasing ROM, reducing/eliminating soft tissue swelling/inflammation/restriction, improving soft tissue extensibility and allowing for proper ROM for normal function with self care, mobility, lifting and ambulation.      Modalities: Charges  Timed Code Treatment Minutes: 45min   Total Treatment Minutes: 45min       [] EVAL (LOW) 05148   [] EVAL (MOD) 94507   [] EVAL (HIGH) 02937   [] RE-EVAL     [x] UM(74464) x  2  [] IONTO  [] NMR (50461) x     [] VASO  [x] Manual (73119) x   1   [] Other:  [] TA x      [] Mech Traction (00406)  [] ES(attended) (59191)      [] ES (un) (42649):       GOALS:  Patient stated goal: dance without pain  [x] Progressing: [] Met: [] Not Met: [] Adjusted    Therapist goals for Patient:   Short Term Goals: To be achieved in: 2 weeks  1. Independent in HEP and progression per patient tolerance, in order to prevent re-injury. [] Progressing: [x] Met: [] Not Met: [] Adjusted  2. Patient will have a decrease in pain to facilitate improvement in movement, function, and ADLs as indicated by Functional Deficits. [] Progressing: [x] Met: [] Not Met: [] Adjusted      Long Term Goals: To be achieved in: 6 weeks  1. Disability index score of 10% or better for the FOTO thoracic score and DFOS  to assist with reaching prior level of function. [] Progressing: [] Met: [] Not Met: [] Adjusted  2. Patient will demonstrate increased AROM to WNL, good LS mobility, good hip ROM to allow for proper joint functioning as indicated by patients Functional Deficits. [] Progressing: [x] Met: [] Not Met: [] Adjusted  3. Patient will demonstrate an increase in Strength to good proximal hip and core activation to allow for proper functional mobility as indicated by patients Functional Deficits. [] Progressing: [] Met: [] Not Met: [] Adjusted  4. Patient will be able to sit with or without back support for at least 1 hour without increased symptoms or restriction. [] Progressing: [x] Met: [] Not Met: [] Adjusted  5. Pt will be able to perform large leg gestures greater than 90 deg in front, side, and back motions with proper lumbar p elvic stability and without symptoms for restriction to allow for return to dance/work.      [x] Progressing: [] Met: [] Not Met: [] Adjusted     Progression Towards Functional goals:  [x] Patient is progressing as expected towards functional goals listed. [] Progression is slowed due to complexities listed. [] Progression has been slowed due to co-morbidities. [] Plan just implemented, too soon to assess goals progression  [] Other:     Overall Progression Towards Functional goals/ Treatment Progress Update:  [] Patient is progressing as expected towards functional goals listed. [] Progression is slowed due to complexities/Impairments listed. [] Progression has been slowed due to co-morbidities. [x] Plan just implemented, too soon to assess goals progression <30days   [] Goals require adjustment due to lack of progress  [] Patient is not progressing as expected and requires additional follow up with physician  [] Other    Prognosis for POC: [x] Good [] Fair  [] Poor      Patient requires continued skilled intervention: [x] Yes  [] No    Treatment/Activity Tolerance:  [x] Patient able to complete treatment  [] Patient limited by fatigue  [] Patient limited by pain    [] Patient limited by other medical complications  [] Other:     ASSESSMENT: Pt continuing to make steady progress, no more localized spasm and now mostly pain free for all ADLs but some dance specific trunk extension is still limited and bothersome. Return to Play: (if applicable)   [x]  Stage 1: Intro to Strength   []  Stage 2: Return to Run and Strength   []  Stage 3: Return to Jump and Strength   []  Stage 4: Dynamic Strength and Agility   []  Stage 5: Sport Specific Training     []  Ready to Return to Play, Meets All Above Stages   []  Not Ready for Return to Sports   Comments:                               PLAN: See eval. Pt to be seen 2 times a week for 6 weeks.    [x] Continue per plan of care [] Alter current plan (see comments above)  [] Plan of care initiated [] Hold pending MD visit [] Discharge      Electronically signed by:  Shana Vaughn, PT, DPT, ATC, OCS [id number]    Note: If patient does not return for scheduled/ recommended follow up visits, this note will serve as a discharge from care along with most recent update on progress.

## 2022-09-21 ENCOUNTER — APPOINTMENT (OUTPATIENT)
Dept: PHYSICAL THERAPY | Age: 22
End: 2022-09-21
Payer: COMMERCIAL

## 2022-09-25 DIAGNOSIS — M54.50 ACUTE BILATERAL LOW BACK PAIN WITHOUT SCIATICA: ICD-10-CM

## 2022-09-25 DIAGNOSIS — S39.012A STRAIN OF LUMBAR REGION, INITIAL ENCOUNTER: ICD-10-CM

## 2022-09-25 DIAGNOSIS — M43.00 PARS DEFECT: ICD-10-CM

## 2022-09-28 ENCOUNTER — HOSPITAL ENCOUNTER (OUTPATIENT)
Dept: PHYSICAL THERAPY | Age: 22
Setting detail: THERAPIES SERIES
Discharge: HOME | End: 2022-09-28
Payer: COMMERCIAL

## 2022-09-28 PROCEDURE — 97140 MANUAL THERAPY 1/> REGIONS: CPT

## 2022-09-28 PROCEDURE — 97110 THERAPEUTIC EXERCISES: CPT

## 2022-09-28 NOTE — FLOWSHEET NOTE
[facility] [address], [address]  Phone: [phone]  Fax [phone]    Physical Therapy Discharge Note:           Date:  2022    Patient Name:  Brenda Lundberg    :  2000  MRN: [id number]  Restrictions/Precautions:    Medical/Treatment Diagnosis Information:  Diagnosis: S39.012 Lumbar strain  Treatment Diagnosis: M54.5 LBP, M54.6 thoracic pain, M99.03 segmental and somatic dysfunction  Insurance/Certification information:  PT Insurance Information: BCBS, $0 copay, 20 visits, req auth - AIM  Physician Information:   Nelida Marie PA-C  Has the plan of care been signed (Y/N):        []  Yes  [x]  No     Date of Patient follow up with Physician: prn      Is this a Progress Report:     []  Yes  [x]  No        If Yes:  Date Range for reporting period:  Beginning 22  Ending    Progress report will be due (10 Rx or 30 days whichever is less): 13       Recertification will be due (POC Duration  / 90 days whichever is less): 10/14/22        Visit # Insurance Allowable Auth Required   In-person 5 20 visits [x]  Yes - AIM []  No    Telehealth - - []  Yes []  No    Total            Functional Scale: FOTO thoracic spine score: 52/100 = 52% ability, 48% disability; Dance Functional Outcome Survery (DFOS):  = 16% ability, 84% disability    Date assessed:  22  DFOS:  = 100% ability, 0% disability on 22       Number of Comorbidities:  [x]0     []1-2    []3+    Latex Allergy:  [x]NO      []YES  Preferred Language for Healthcare:   [x]English       []other:      Pain level:  0/10 \" just some stiffness\"    SUBJECTIVE:  Pt reports she feels like she is back at 100% with ADLs and dancing. She is happy with her recovery and feels ready for D/C.     OBJECTIVE:   Observation: no tenderness, no spasms, normal P-A mobility in t-spine and l-spine    Test measurements:       ROM  EVAL EVAL  22   LUMBAR FLEX 80%   100%   LUMBAR EXT 75%   100%   Sidebend L50% R 90% 100% B   Rotation L 40% R 70% 100% B   Strength  LEFT RIGHT 9/28/22   MfA good Fair+ Good B   TrA Good-   good   HIP Flexors 5/5 5-/5 mild pain 5/5 B   HIP Abductors 5/5 5-/5 mild pain 5/5 B   HIP ER 5-/5 5-/5 5/5 B   Hip IR 5/5 5/5 5/5 B   Knee EXT (quad) 5/5 5/5 5/5 B   Knee Flex (HS) 5/5 5/5 5/5 B   Ankle DF 5/5 5/5    Ankle PF 5/5 5/5    Great Toe Ext 5/5 5/5      RESTRICTIONS/PRECAUTIONS: none    Exercises/Interventions:     Therapeutic Ex (20631) Sets/Reps/ sec Notes/CUES   Cat/camel    Child's pose    Supine Tball LE rotation    Kneeling tball thoracic extension AAROM    Kneeling thread the needle    Supine open book    Seated Thoracic extension ROM    Supine TA med ball overhead Red med ball   Quadruped progam    Row series: row, low row, high row Cue retract without rib flare   B ER \"   Supine foam roller snow angles    Supine foam roller 90/90 toe taps    Ref: row series  Low row  Horiz abd 1R 2x10    Ref: pec press \"bear hug\" 1R1B 2x10    Ref: mermaid 2X5 R/L  W/ rotation 2x5 R/L    Ref: plank with SA presses  Plank with UE flexion press out 2R 2x10  2R 2x10    Ref: standing overhead press 1B1Y 2x10    [place]adilac thoracic extensions    Ref: seated torso rotataions 1B x10 R/L    Tball walkout bridge w/ heel raise 2x5x5    Tball TA arms \"Y\" 2# 2x10    Ref: prone TA HS curl 1R x8    Manual Intervention (20046)     STM: thoracolumbar paraspinals, QL, lat dorsi, upper trap, glutes, pirifomris 15' MHP on back while working on glutes   Lumbar roll manipulation Correct L4 right rot; caviation   Cupping R paraspinals                   NMR re-education (34825)  CUES NEEDED                                                Therapeutic Activity (75517)                                       Therapeutic Exercise and NMR EXR  [x] (74518) Provided verbal/tactile cueing for activities related to strengthening, flexibility, endurance, ROM  for improvements in proximal hip and core control with self care, mobility, lifting and ambulation.  [] (48492) Provided verbal/tactile cueing for activities related to improving balance, coordination, kinesthetic sense, posture, motor skill, proprioception  to assist with core control in self care, mobility, lifting, and ambulation. Therapeutic Activities:    [] (00169 or 87223) Provided verbal/tactile cueing for activities related to improving balance, coordination, kinesthetic sense, posture, motor skill, proprioception and motor activation to allow for proper function  with self care and ADLs  [] (66491) Provided training and instruction to the patient for proper core and proximal hip recruitment and positioning with ambulation re-education     Home Exercise Program:    [] (42141) Reviewed/Progressed HEP activities related to strengthening, flexibility, endurance, ROM of core, proximal hip and LE for functional self-care, mobility, lifting and ambulation   [] (63833) Reviewed/Progressed HEP activities related to improving balance, coordination, kinesthetic sense, posture, motor skill, proprioception of core, proximal hip and LE for self care, mobility, lifting, and ambulation      Access Code: [email or web address]  URL: 100Plus  Date: 09/12/2022  Prepared by: Amrita Fritz)    Exercises  Standing Row with Anchored Resistance - 1 x daily - 7 x weekly - 3 sets - 10 reps  Standing Row with Resistance with Anchored Resistance at Chest Height Palms Down - 1 x daily - 7 x weekly - 3 sets - 10 reps  Shoulder extension with resistance - Neutral - 1 x daily - 7 x weekly - 3 sets - 10 reps  Shoulder External Rotation and Scapular Retraction with Resistance - 1 x daily - 7 x weekly - 3 sets - 10 reps  Supine 90/90 Alternating Toe Touch - 1 x daily - 7 x weekly - 3 sets - 10 reps  Supine Thoracic Mobilization Foam Roll Horizontal with Arm Stretch - 1 x daily - 7 x weekly - 3 sets - 10 reps    Manual Treatments:  PROM / STM / Oscillations-Mobs:  G-I, II, III, IV (PA's, Inf., Post.)  [x] (84241) Provided manual therapy to mobilize proximal hip and LS spine soft tissue/joints for the purpose of modulating pain, promoting relaxation,  increasing ROM, reducing/eliminating soft tissue swelling/inflammation/restriction, improving soft tissue extensibility and allowing for proper ROM for normal function with self care, mobility, lifting and ambulation. Modalities:       Charges  Timed Code Treatment Minutes: 40min   Total Treatment Minutes: 40min       [] EVAL (LOW) 91529   [] EVAL (MOD) 81594   [] EVAL (HIGH) 32003   [] RE-EVAL     [x] YP(46684) x  2  [] IONTO  [] NMR (31038) x     [] VASO  [x] Manual (97504) x   1   [] Other:  [] TA x      [] Mech Traction (31139)  [] ES(attended) (48904)      [] ES (un) (72920):       GOALS:  Patient stated goal: dance without pain  [] Progressing: [x] Met: [] Not Met: [] Adjusted    Therapist goals for Patient:   Short Term Goals: To be achieved in: 2 weeks  1. Independent in HEP and progression per patient tolerance, in order to prevent re-injury. [] Progressing: [x] Met: [] Not Met: [] Adjusted  2. Patient will have a decrease in pain to facilitate improvement in movement, function, and ADLs as indicated by Functional Deficits. [] Progressing: [x] Met: [] Not Met: [] Adjusted      Long Term Goals: To be achieved in: 6 weeks  1. Disability index score of 10% or better for the FOTO thoracic score and DFOS  to assist with reaching prior level of function. [] Progressing: [x] Met: [] Not Met: [] Adjusted  2. Patient will demonstrate increased AROM to WNL, good LS mobility, good hip ROM to allow for proper joint functioning as indicated by patients Functional Deficits. [] Progressing: [x] Met: [] Not Met: [] Adjusted  3. Patient will demonstrate an increase in Strength to good proximal hip and core activation to allow for proper functional mobility as indicated by patients Functional Deficits.    [] Progressing: [x] Met: [] Not Met: [] Adjusted  4. Patient will be able to sit with or without back support for at least 1 hour without increased symptoms or restriction. [] Progressing: [x] Met: [] Not Met: [] Adjusted  5. Pt will be able to perform large leg gestures greater than 90 deg in front, side, and back motions with proper lumbar p elvic stability and without symptoms for restriction to allow for return to dance/work. [] Progressing: [x] Met: [] Not Met: [] Adjusted     Progression Towards Functional goals:  [x] Patient is progressing as expected towards functional goals listed. [] Progression is slowed due to complexities listed. [] Progression has been slowed due to co-morbidities. [] Plan just implemented, too soon to assess goals progression  [] Other:     Overall Progression Towards Functional goals/ Treatment Progress Update:  [x] Patient is progressing as expected towards functional goals listed. [] Progression is slowed due to complexities/Impairments listed. [] Progression has been slowed due to co-morbidities. [] Plan just implemented, too soon to assess goals progression <30days   [] Goals require adjustment due to lack of progress  [] Patient is not progressing as expected and requires additional follow up with physician  [] Other    Prognosis for POC: [x] Good [] Fair  [] Poor      Patient requires continued skilled intervention: [x] Yes  [] No    Treatment/Activity Tolerance:  [x] Patient able to complete treatment  [] Patient limited by fatigue  [] Patient limited by pain    [] Patient limited by other medical complications  [] Other:     ASSESSMENT: Pt has return to normal pain free ROM, flexibility, strength as well as a full return to dance/ work without pain or limitation. Pt is really happy with her recovery. Pt has met all goals and ready for D/C.     Return to Play: (if applicable)   []  Stage 1: Intro to Strength   []  Stage 2: Return to Run and Strength   []  Stage 3:

## 2022-09-28 NOTE — TELEPHONE ENCOUNTER
Patient last seen 8/29/2022 and medication last filled 8/29/2022:           Impression:  1) Acute axial low back pain, r/out acute spondylolysis  2) Underlying scoliosis         Plan:   1) We reviewed her recent lumbar x-rays today in detail.   I recommend lumbar MRI with [facility] to assess for acute versus chronic spondylolysis  2) Activity modification, hold sports  3) Declining warm & form brace  4) Mobic 7.5mg I po qd PRN--with GI precaution, DC other NSAIDs  5) F/u to review L MRI               Austen Canela PA-C, MPAS  Board Certified by the [facility] [address]

## 2022-09-29 RX ORDER — MELOXICAM 7.5 MG/1
7.5 TABLET ORAL DAILY PRN
Qty: 30 TABLET | Refills: 0 | OUTPATIENT
Start: 2022-09-29

## 2022-09-30 ENCOUNTER — APPOINTMENT (OUTPATIENT)
Dept: PHYSICAL THERAPY | Age: 22
End: 2022-09-30
Payer: COMMERCIAL

## 2022-10-12 ENCOUNTER — TELEMEDICINE (OUTPATIENT)
Dept: FAMILY MEDICINE CLINIC | Age: 22
End: 2022-10-12
Payer: COMMERCIAL

## 2022-10-12 DIAGNOSIS — F32.A ANXIETY AND DEPRESSION: Primary | ICD-10-CM

## 2022-10-12 DIAGNOSIS — F41.9 ANXIETY AND DEPRESSION: Primary | ICD-10-CM

## 2022-10-12 PROCEDURE — 99213 OFFICE O/P EST LOW 20 MIN: CPT | Performed by: FAMILY MEDICINE

## 2022-10-12 RX ORDER — FLUOXETINE HYDROCHLORIDE 40 MG/1
40 CAPSULE ORAL DAILY
Qty: 30 CAPSULE | Refills: 5 | Status: SHIPPED | OUTPATIENT
Start: 2022-10-12

## 2022-10-12 NOTE — PROGRESS NOTES
10/12/2022    TELEHEALTH EVALUATION -- Audio/Visual (During JUJTA-05 public health emergency)    HPI:    Harry Quiroz (:  2000) has requested an audio/video evaluation for the following concern(s):    Pt had covid 22, is feeling better except for mild rhinorrhea. Pt has anx and dep and started prozac 20 qd 1 mo ago. She cont's telehealth with Dr Ludy Patino, sports psychologist, 1-2 x per wk. Is tolerating prozac but feels more depressed at times. Anxiety has not improved but pt notes fewer anxiety attacks. Finds she is able to work throught anxiety, does not have to leave room but will do so if able. Is willing to try higher dose. Review of Systems  As above      Prior to Visit Medications    Medication Sig Taking? Authorizing Provider   FLUoxetine (PROZAC) 40 MG capsule Take 1 capsule by mouth daily Yes Meryl Montoya MD   meloxicam (MOBIC) 7.5 MG tablet Take 1 tablet by mouth daily as needed for Pain (PAIN)  Olinda Erickson PA-C       Social History     Tobacco Use    Smoking status: Never    Smokeless tobacco: Never   Substance Use Topics    Alcohol use: Yes     Comment: Rarely    Drug use: Yes     Types: Marijuana (Weed)     Comment: few times per wk for anx and insomnia            PHYSICAL EXAMINATION:  [ INSTRUCTIONS:  \"[x]\" Indicates a positive item  \"[]\" Indicates a negative item  -- DELETE ALL ITEMS NOT EXAMINED]      Constitutional: [x] Appears well-developed and well-nourished [x] No apparent distress      [] Abnormal-   Mental status  [x] Alert and awake  [x] Oriented to person/place/time [x]Able to follow commands      Eyes:  EOM    [x]  Normal  [] Abnormal-  Sclera  [x]  Normal  [] Abnormal -         Discharge [x]  None visible  [] Abnormal -    HENT:   [x] Normocephalic, atraumatic.   [] Abnormal   [] Mouth/Throat: Mucous membranes are moist.     External Ears [x] Normal  [] Abnormal-     Neck: [x] No visualized mass     Pulmonary/Chest: [x] Respiratory effort normal.  [x] No visualized signs of difficulty breathing or respiratory distress        [] Abnormal-      Musculoskeletal:   [] Normal gait with no signs of ataxia         [x] Normal range of motion of neck        [] Abnormal-       Neurological:        [x] No Facial Asymmetry (Cranial nerve 7 motor function) (limited exam to video visit)          [x] No gaze palsy        [] Abnormal-         Skin:        [x] No significant exanthematous lesions or discoloration noted on facial skin         [] Abnormal-            Psychiatric:       [x] Normal Affect [] No Hallucinations        [] Abnormal-     Other pertinent observable physical exam findings-     ASSESSMENT/PLAN:  1. Anxiety and depression  - FLUoxetine (PROZAC) 40 MG capsule; Take 1 capsule by mouth daily  Dispense: 30 capsule; Refill: 5, increase dose from 20 mg qd    No follow-ups on file. Cy Loredo is a 25 y.o. female being evaluated by a Virtual Visit (video visit) encounter to address concerns as mentioned above. A caregiver was present when appropriate. Due to this being a TeleHealth encounter (During YFJYQ-93 public health emergency), evaluation of the following organ systems was limited: Vitals/Constitutional/EENT/Resp/CV/GI//MS/Neuro/Skin/Heme-Lymph-Imm. Pursuant to the emergency declaration under the [address] [place] [address] and the [employer] and [employer] Act, this Virtual Visit was conducted with patient's (and/or legal guardian's) consent, to reduce the patient's risk of exposure to COVID-19 and provide necessary medical care. The patient (and/or legal guardian) has also been advised to contact this office for worsening conditions or problems, and seek emergency medical treatment and/or call 911 if deemed necessary. Cy Loredo, was evaluated through a synchronous (real-time) audio-video encounter.  The patient (or guardian if applicable) is aware that this is a billable service, which includes applicable co-pays. This Virtual Visit was conducted with patient's (and/or legal guardian's) consent. The visit was conducted pursuant to the emergency declaration under the [address] [place] [address] and the [employer] and [employer] Act. Patient identification was verified, and a caregiver was present when appropriate. The patient was located at Home: [place] [id number]. Provider was located at Home ([address]: [place]. Total time spent for this encounter: Not billed by time    --Kye Pedersen MD on 10/12/2022 at 9:11 AM    An electronic signature was used to authenticate this note. Patient identification was verified at the start of the visit: Yes    Services were provided through a video synchronous discussion virtually to substitute for in-person clinic visit. Patient and provider were located at their individual homes.

## 2022-11-10 ENCOUNTER — TELEMEDICINE (OUTPATIENT)
Dept: FAMILY MEDICINE CLINIC | Age: 22
End: 2022-11-10
Payer: COMMERCIAL

## 2022-11-10 DIAGNOSIS — F41.9 ANXIETY: Primary | ICD-10-CM

## 2022-11-10 PROCEDURE — 99213 OFFICE O/P EST LOW 20 MIN: CPT | Performed by: NURSE PRACTITIONER

## 2022-11-10 RX ORDER — HYDROXYZINE HYDROCHLORIDE 10 MG/1
10 TABLET, FILM COATED ORAL 3 TIMES DAILY PRN
Qty: 30 TABLET | Refills: 0 | Status: SHIPPED | OUTPATIENT
Start: 2022-11-10 | End: 2022-11-20

## 2022-11-10 SDOH — ECONOMIC STABILITY: FOOD INSECURITY: WITHIN THE PAST 12 MONTHS, YOU WORRIED THAT YOUR FOOD WOULD RUN OUT BEFORE YOU GOT MONEY TO BUY MORE.: NEVER TRUE

## 2022-11-10 SDOH — ECONOMIC STABILITY: FOOD INSECURITY: WITHIN THE PAST 12 MONTHS, THE FOOD YOU BOUGHT JUST DIDN'T LAST AND YOU DIDN'T HAVE MONEY TO GET MORE.: NEVER TRUE

## 2022-11-10 ASSESSMENT — ENCOUNTER SYMPTOMS
RESPIRATORY NEGATIVE: 1
GASTROINTESTINAL NEGATIVE: 1

## 2022-11-10 ASSESSMENT — SOCIAL DETERMINANTS OF HEALTH (SDOH): HOW HARD IS IT FOR YOU TO PAY FOR THE VERY BASICS LIKE FOOD, HOUSING, MEDICAL CARE, AND HEATING?: NOT HARD AT ALL

## 2022-11-10 NOTE — PROGRESS NOTES
well-developed and well-nourished [x] No apparent distress      [] Abnormal-   Mental status  [x] Alert and awake  [] Oriented to person/place/time []Able to follow commands      Eyes:  EOM    []  Normal  [] Abnormal-  Sclera  []  Normal  [] Abnormal -         Discharge []  None visible  [] Abnormal -    HENT:   [x] Normocephalic, atraumatic. [] Abnormal   [x] Mouth/Throat: Mucous membranes are moist.     External Ears [] Normal  [] Abnormal-     Neck: [] No visualized mass     Pulmonary/Chest: [x] Respiratory effort normal.  [] No visualized signs of difficulty breathing or respiratory distress        [] Abnormal-      Musculoskeletal:   [] Normal gait with no signs of ataxia         [] Normal range of motion of neck        [] Abnormal-       Neurological:        [] No Facial Asymmetry (Cranial nerve 7 motor function) (limited exam to video visit)          [] No gaze palsy        [] Abnormal-         Skin:        [x] No significant exanthematous lesions or discoloration noted on facial skin         [] Abnormal-            Psychiatric:       [] Normal Affect [] No Hallucinations        [] Abnormal-     Other pertinent observable physical exam findings-     ASSESSMENT/PLAN:  1. Anxiety  Continue prozac 40 mg daily and start hydroxyzine 10 mg TID as needed. Side effects and uses discussed and patient verbalized her understanding. Follow up in 1 month with [facility].   - hydrOXYzine HCl (ATARAX) 10 MG tablet; Take 1 tablet by mouth 3 times daily as needed for Anxiety  Dispense: 30 tablet; Refill: 0        Michelle Phelps is a 25 y.o. female being evaluated by a Virtual Visit (video visit) encounter to address concerns as mentioned above. A caregiver was present when appropriate. Due to this being a TeleHealth encounter (During WNFAX-72 public health emergency), evaluation of the following organ systems was limited: Vitals/Constitutional/EENT/Resp/CV/GI//MS/Neuro/Skin/Heme-Lymph-Imm.   Pursuant to the emergency declaration under the [facility], 1135 waiver authority and the [employer] and [employer] Act, this Virtual Visit was conducted with patient's (and/or legal guardian's) consent, to reduce the patient's risk of exposure to COVID-19 and provide necessary medical care. The patient (and/or legal guardian) has also been advised to contact this office for worsening conditions or problems, and seek emergency medical treatment and/or call 911 if deemed necessary. Services were provided through a video synchronous discussion virtually to substitute for in-person clinic visit. Patient and provider were located at their individual homes. --ESAU Torres - CNP on 11/10/2022 at 11:59 AM    This chart was generated using the [address] [place] [address] [address] dictation system. I created this record but it may contain dictation errors due to the limitation of the software. An electronic signature was used to authenticate this note.

## 2023-01-17 NOTE — PROGRESS NOTES
2023    TELEHEALTH EVALUATION -- Audio/Visual (During UVKYU-22 public health emergency)    HPI:    Alanna Melendez (:  2000) has requested an audio/video evaluation for the following concern(s):    Pt with anx/dep cont' on prozac 40. Hydroxyzine 10 tid prn was added 2022 for panic attacks. Has taken a few times, only during full blown panic attack as it causes fatigue. Feels anxiety has been worse in recent mo's. When not anxious, feels depressed. Sleep is restless. Has occ nightmares. No si in recent mo's. She cont's telehealth with Dr Anisha Perez, sports psychologist, 1 x per wk. Dr CARLOS beasley , [facility], sees virtually q 2 wk. Review of Systems  As above      Prior to Visit Medications    Medication Sig Taking? Authorizing Provider   buPROPion (WELLBUTRIN XL) 150 MG extended release tablet Take 1 tablet by mouth every morning Yes Rowan Kapadia MD   FLUoxetine (PROZAC) 40 MG capsule Take 1 capsule by mouth daily Yes Rowan Kapadia MD       Social History     Tobacco Use    Smoking status: Never    Smokeless tobacco: Never   Substance Use Topics    Alcohol use: Yes     Comment: Rarely    Drug use: Yes     Types: Marijuana (Weed)     Comment: few times per wk for anx and insomnia            PHYSICAL EXAMINATION:  [ INSTRUCTIONS:  \"[x]\" Indicates a positive item  \"[]\" Indicates a negative item  -- DELETE ALL ITEMS NOT EXAMINED]      Constitutional: [x] Appears well-developed and well-nourished [x] No apparent distress      [] Abnormal-   Mental status  [x] Alert and awake  [x] Oriented to person/place/time [x]Able to follow commands      Eyes:  EOM    [x]  Normal  [] Abnormal-  Sclera  [x]  Normal  [] Abnormal -         Discharge [x]  None visible  [] Abnormal -    HENT:   [x] Normocephalic, atraumatic.   [] Abnormal   [] Mouth/Throat: Mucous membranes are moist.     External Ears [x] Normal  [] Abnormal-     Neck: [x] No visualized mass     Pulmonary/Chest: [x] Respiratory effort normal.  [x] No visualized signs of difficulty breathing or respiratory distress        [] Abnormal-      Musculoskeletal:   [] Normal gait with no signs of ataxia         [x] Normal range of motion of neck        [] Abnormal-       Neurological:        [x] No Facial Asymmetry (Cranial nerve 7 motor function) (limited exam to video visit)          [x] No gaze palsy        [] Abnormal-         Skin:        [x] No significant exanthematous lesions or discoloration noted on facial skin         [] Abnormal-            Psychiatric:       [x] Normal Affect [] No Hallucinations        [] Abnormal-     Other pertinent observable physical exam findings-     ASSESSMENT/PLAN:  1. Anxiety and depression  - cont prozac 40  - buPROPion (WELLBUTRIN XL) 150 MG extended release tablet; Take 1 tablet by mouth every morning  Dispense: 30 tablet; Refill: 5, new rx  - cont hydroxyzine prn panic attack    2. Chronic insomnia  - anticipate improvement as anx/dep lift.  - OK to use hydroxyzine 10 mg prn sleep    3. PTSD (post-traumatic stress disorder)  - Recent dx per Dr Aj, psychologist. 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 Dr Aj feels pt can tackle PTSD better once out of current environment.     Return in about 6 weeks (around 3/1/2023).    Kathleen Herndon is a 22 y.o. female being evaluated by a Virtual Visit (video visit) encounter to address concerns as mentioned above.  A caregiver was present when appropriate. Due to this being a TeleHealth encounter (During COVID-19 public health emergency), evaluation of the following organ systems was limited: Vitals/Constitutional/EENT/Resp/CV/GI//MS/Neuro/Skin/Heme-Lymph-Imm.  Pursuant to the emergency declaration under the [employer] Act and the National Emergencies Act, 1135 waiver authority and the Coronavirus Preparedness and Response Supplemental Appropriations Act, this Virtual Visit was conducted with patient's (and/or legal  guardian's) consent, to reduce the patient's risk of exposure to COVID-19 and provide necessary medical care. The patient (and/or legal guardian) has also been advised to contact this office for worsening conditions or problems, and seek emergency medical treatment and/or call 911 if deemed necessary. Kel Maynard, was evaluated through a synchronous (real-time) audio-video encounter. The patient (or guardian if applicable) is aware that this is a billable service, which includes applicable co-pays. This Virtual Visit was conducted with patient's (and/or legal guardian's) consent. The visit was conducted pursuant to the emergency declaration under the [address] [place] [address], [address] [place] [address] authority and the [employer] and [employer] Act. Patient identification was verified, and a caregiver was present when appropriate. The patient was located at Home: [place] [id number]. Provider was located at [facility] (App[facility]  Claude Jenniferclaus . Total time spent for this encounter:  30 min    --SULTANA Owen MD on 1/18/2023 at 8:30 AM    An electronic signature was used to authenticate this note. Patient identification was verified at the start of the visit: Yes    Services were provided through a video synchronous discussion virtually to substitute for in-person clinic visit. Patient and provider were located at their individual homes.

## 2023-01-18 ENCOUNTER — TELEMEDICINE (OUTPATIENT)
Dept: FAMILY MEDICINE CLINIC | Age: 23
End: 2023-01-18
Payer: COMMERCIAL

## 2023-01-18 DIAGNOSIS — F41.9 ANXIETY AND DEPRESSION: Primary | ICD-10-CM

## 2023-01-18 DIAGNOSIS — F32.A ANXIETY AND DEPRESSION: Primary | ICD-10-CM

## 2023-01-18 DIAGNOSIS — F51.04 CHRONIC INSOMNIA: ICD-10-CM

## 2023-01-18 DIAGNOSIS — F43.10 PTSD (POST-TRAUMATIC STRESS DISORDER): ICD-10-CM

## 2023-01-18 PROCEDURE — 99214 OFFICE O/P EST MOD 30 MIN: CPT | Performed by: FAMILY MEDICINE

## 2023-01-18 RX ORDER — BUPROPION HYDROCHLORIDE 150 MG/1
150 TABLET ORAL EVERY MORNING
Qty: 30 TABLET | Refills: 5 | Status: SHIPPED | OUTPATIENT
Start: 2023-01-18

## 2023-05-08 ENCOUNTER — TELEMEDICINE (OUTPATIENT)
Dept: FAMILY MEDICINE CLINIC | Age: 23
End: 2023-05-08
Payer: COMMERCIAL

## 2023-05-08 DIAGNOSIS — F41.9 ANXIETY AND DEPRESSION: Primary | ICD-10-CM

## 2023-05-08 DIAGNOSIS — G47.00 MIDDLE INSOMNIA: ICD-10-CM

## 2023-05-08 DIAGNOSIS — F32.A ANXIETY AND DEPRESSION: Primary | ICD-10-CM

## 2023-05-08 PROCEDURE — 1036F TOBACCO NON-USER: CPT | Performed by: FAMILY MEDICINE

## 2023-05-08 PROCEDURE — G8420 CALC BMI NORM PARAMETERS: HCPCS | Performed by: FAMILY MEDICINE

## 2023-05-08 PROCEDURE — 99213 OFFICE O/P EST LOW 20 MIN: CPT | Performed by: FAMILY MEDICINE

## 2023-05-08 PROCEDURE — G8427 DOCREV CUR MEDS BY ELIG CLIN: HCPCS | Performed by: FAMILY MEDICINE

## 2023-05-08 RX ORDER — TRAZODONE HYDROCHLORIDE 50 MG/1
TABLET ORAL
Qty: 60 TABLET | Refills: 5 | Status: SHIPPED | OUTPATIENT
Start: 2023-05-08

## 2023-05-08 ASSESSMENT — PATIENT HEALTH QUESTIONNAIRE - PHQ9
SUM OF ALL RESPONSES TO PHQ QUESTIONS 1-9: 0
8. MOVING OR SPEAKING SO SLOWLY THAT OTHER PEOPLE COULD HAVE NOTICED. OR THE OPPOSITE, BEING SO FIGETY OR RESTLESS THAT YOU HAVE BEEN MOVING AROUND A LOT MORE THAN USUAL: 0
6. FEELING BAD ABOUT YOURSELF - OR THAT YOU ARE A FAILURE OR HAVE LET YOURSELF OR YOUR FAMILY DOWN: 0
9. THOUGHTS THAT YOU WOULD BE BETTER OFF DEAD, OR OF HURTING YOURSELF: 0
5. POOR APPETITE OR OVEREATING: 0
SUM OF ALL RESPONSES TO PHQ QUESTIONS 1-9: 0
7. TROUBLE CONCENTRATING ON THINGS, SUCH AS READING THE NEWSPAPER OR WATCHING TELEVISION: 0
1. LITTLE INTEREST OR PLEASURE IN DOING THINGS: 0
10. IF YOU CHECKED OFF ANY PROBLEMS, HOW DIFFICULT HAVE THESE PROBLEMS MADE IT FOR YOU TO DO YOUR WORK, TAKE CARE OF THINGS AT HOME, OR GET ALONG WITH OTHER PEOPLE: 0
2. FEELING DOWN, DEPRESSED OR HOPELESS: 0
4. FEELING TIRED OR HAVING LITTLE ENERGY: 0
SUM OF ALL RESPONSES TO PHQ QUESTIONS 1-9: 0
SUM OF ALL RESPONSES TO PHQ9 QUESTIONS 1 & 2: 0
3. TROUBLE FALLING OR STAYING ASLEEP: 0
SUM OF ALL RESPONSES TO PHQ QUESTIONS 1-9: 0

## 2023-05-08 NOTE — PROGRESS NOTES
2023    TELEHEALTH EVALUATION -- Audio/Visual (During [id number] public health emergency)    HPI:    Naa Mckeon (:  2000) has requested an audio/video evaluation for the following concern(s):    Pt with anx/dep cont on prozac 40. Wellbutrin  was started 2023. Feels happier in daytime but conts to have middle insomnia. Has left  II. To start with [facility] in 2023. Review of Systems  As above  Prior to Visit Medications    Medication Sig Taking? Authorizing Provider   traZODone (DESYREL) 50 MG tablet 1-2 po qhs for middle insomnia Yes Bianca Givens, MD   FLUoxetine (PROZAC) 40 MG capsule TAKE 1 CAPSULE BY MOUTH EVERY DAY Yes Bianca Givens, MD   buPROPion (WELLBUTRIN XL) 150 MG extended release tablet Take 1 tablet by mouth every morning Yes Bianca Givens, MD       Social History     Tobacco Use    Smoking status: Never    Smokeless tobacco: Never   Substance Use Topics    Alcohol use: Yes     Comment: Rarely    Drug use: Yes     Types: Marijuana (Weed)     Comment: few times per wk for anx and insomnia            PHYSICAL EXAMINATION:  [ INSTRUCTIONS:  \"[x]\" Indicates a positive item  \"[]\" Indicates a negative item  -- DELETE ALL ITEMS NOT EXAMINED]      Constitutional: [x] Appears well-developed and well-nourished [x] No apparent distress      [] Abnormal-   Mental status  [x] Alert and awake  [x] Oriented to person/place/time [x]Able to follow commands      Eyes:  EOM    [x]  Normal  [] Abnormal-  Sclera  [x]  Normal  [] Abnormal -         Discharge [x]  None visible  [] Abnormal -    HENT:   [x] Normocephalic, atraumatic.   [] Abnormal   [] Mouth/Throat: Mucous membranes are moist.     External Ears [x] Normal  [] Abnormal-     Neck: [x] No visualized mass     Pulmonary/Chest: [x] Respiratory effort normal.  [x] No visualized signs of difficulty breathing or respiratory distress        [] Abnormal-      Musculoskeletal:   [] Normal gait with no signs of ataxia         [x]

## 2023-09-28 DIAGNOSIS — F32.A ANXIETY AND DEPRESSION: ICD-10-CM

## 2023-09-28 DIAGNOSIS — F41.9 ANXIETY AND DEPRESSION: ICD-10-CM

## 2023-09-29 RX ORDER — FLUOXETINE HYDROCHLORIDE 40 MG/1
40 CAPSULE ORAL DAILY
Qty: 90 CAPSULE | Refills: 1 | Status: SHIPPED | OUTPATIENT
Start: 2023-09-29

## 2023-09-29 RX ORDER — FLUOXETINE HYDROCHLORIDE 40 MG/1
40 CAPSULE ORAL DAILY
Qty: 90 CAPSULE | Refills: 1 | Status: SHIPPED | OUTPATIENT
Start: 2023-09-29 | End: 2023-09-29

## 2023-09-29 NOTE — TELEPHONE ENCOUNTER
Last Office Visit  -  5/8/23  Next Office Visit  -  NA    Last Filled  -  4/12/23  FLUoxetine (PROZAC) 40 MG capsule

## 2024-04-22 ENCOUNTER — TELEPHONE (OUTPATIENT)
Dept: FAMILY MEDICINE CLINIC | Age: 24
End: 2024-04-22

## 2024-04-22 DIAGNOSIS — F32.A ANXIETY AND DEPRESSION: ICD-10-CM

## 2024-04-22 DIAGNOSIS — F41.9 ANXIETY AND DEPRESSION: ICD-10-CM

## 2024-04-22 RX ORDER — FLUOXETINE HYDROCHLORIDE 40 MG/1
40 CAPSULE ORAL DAILY
Qty: 90 CAPSULE | Refills: 0 | Status: SHIPPED | OUTPATIENT
Start: 2024-04-22

## 2024-04-22 NOTE — TELEPHONE ENCOUNTER
Last Office Visit  -  05/08/2023  Next Office Visit  -  n/a    Last Filled  -    Last UDS -    Contract -

## 2024-04-22 NOTE — TELEPHONE ENCOUNTER
[facility] [address] [place] req refill for patient on  Fluoxetine 40 mg caps  Last visit 5/8/23  No future  [facility] [place]

## 2025-04-17 DIAGNOSIS — F41.9 ANXIETY AND DEPRESSION: ICD-10-CM

## 2025-04-17 DIAGNOSIS — F32.A ANXIETY AND DEPRESSION: ICD-10-CM

## 2025-04-17 RX ORDER — FLUOXETINE HYDROCHLORIDE 40 MG/1
CAPSULE ORAL DAILY
Qty: 90 CAPSULE | Refills: 0 | Status: SHIPPED | OUTPATIENT
Start: 2025-04-17